# Patient Record
Sex: MALE | Race: WHITE | Employment: OTHER | URBAN - METROPOLITAN AREA
[De-identification: names, ages, dates, MRNs, and addresses within clinical notes are randomized per-mention and may not be internally consistent; named-entity substitution may affect disease eponyms.]

---

## 2017-01-09 RX ORDER — OMEPRAZOLE 40 MG/1
CAPSULE, DELAYED RELEASE ORAL
Qty: 90 CAPSULE | Refills: 3 | Status: SHIPPED | OUTPATIENT
Start: 2017-01-09 | End: 2017-04-14 | Stop reason: SDUPTHER

## 2017-02-24 RX ORDER — PRAVASTATIN SODIUM 20 MG
TABLET ORAL
Qty: 90 TABLET | Refills: 1 | Status: SHIPPED | OUTPATIENT
Start: 2017-02-24 | End: 2017-05-22 | Stop reason: SDUPTHER

## 2017-03-28 ENCOUNTER — OFFICE VISIT (OUTPATIENT)
Dept: FAMILY MEDICINE CLINIC | Age: 67
End: 2017-03-28

## 2017-03-28 VITALS
HEIGHT: 72 IN | WEIGHT: 271 LBS | SYSTOLIC BLOOD PRESSURE: 128 MMHG | TEMPERATURE: 98.1 F | RESPIRATION RATE: 18 BRPM | OXYGEN SATURATION: 98 % | HEART RATE: 72 BPM | DIASTOLIC BLOOD PRESSURE: 82 MMHG | BODY MASS INDEX: 36.7 KG/M2

## 2017-03-28 DIAGNOSIS — I10 ESSENTIAL HYPERTENSION: Primary | ICD-10-CM

## 2017-03-28 DIAGNOSIS — M10.072 ACUTE IDIOPATHIC GOUT OF LEFT FOOT: ICD-10-CM

## 2017-03-28 DIAGNOSIS — N18.9 CHRONIC KIDNEY DISEASE, UNSPECIFIED STAGE: ICD-10-CM

## 2017-03-28 PROCEDURE — G8419 CALC BMI OUT NRM PARAM NOF/U: HCPCS | Performed by: FAMILY MEDICINE

## 2017-03-28 PROCEDURE — G8598 ASA/ANTIPLAT THER USED: HCPCS | Performed by: FAMILY MEDICINE

## 2017-03-28 PROCEDURE — G8427 DOCREV CUR MEDS BY ELIG CLIN: HCPCS | Performed by: FAMILY MEDICINE

## 2017-03-28 PROCEDURE — 4040F PNEUMOC VAC/ADMIN/RCVD: CPT | Performed by: FAMILY MEDICINE

## 2017-03-28 PROCEDURE — 1036F TOBACCO NON-USER: CPT | Performed by: FAMILY MEDICINE

## 2017-03-28 PROCEDURE — 99214 OFFICE O/P EST MOD 30 MIN: CPT | Performed by: FAMILY MEDICINE

## 2017-03-28 PROCEDURE — 3017F COLORECTAL CA SCREEN DOC REV: CPT | Performed by: FAMILY MEDICINE

## 2017-03-28 PROCEDURE — 1123F ACP DISCUSS/DSCN MKR DOCD: CPT | Performed by: FAMILY MEDICINE

## 2017-03-28 PROCEDURE — G8484 FLU IMMUNIZE NO ADMIN: HCPCS | Performed by: FAMILY MEDICINE

## 2017-03-28 RX ORDER — ALLOPURINOL 300 MG/1
300 TABLET ORAL DAILY
Qty: 90 TABLET | Refills: 1 | Status: SHIPPED | OUTPATIENT
Start: 2017-03-28 | End: 2017-05-20 | Stop reason: SDUPTHER

## 2017-03-28 ASSESSMENT — ENCOUNTER SYMPTOMS
SHORTNESS OF BREATH: 0
ALLERGIC/IMMUNOLOGIC NEGATIVE: 1
GASTROINTESTINAL NEGATIVE: 1
EYES NEGATIVE: 1
RESPIRATORY NEGATIVE: 1

## 2017-04-03 RX ORDER — LISINOPRIL 5 MG/1
TABLET ORAL
Qty: 90 TABLET | Refills: 1 | Status: SHIPPED | OUTPATIENT
Start: 2017-04-03 | End: 2017-09-13 | Stop reason: SDUPTHER

## 2017-04-14 RX ORDER — OMEPRAZOLE 40 MG/1
CAPSULE, DELAYED RELEASE ORAL
Qty: 90 CAPSULE | Refills: 3 | Status: SHIPPED | OUTPATIENT
Start: 2017-04-14 | End: 2018-03-31 | Stop reason: SDUPTHER

## 2017-05-19 ENCOUNTER — OFFICE VISIT (OUTPATIENT)
Dept: CARDIOLOGY | Age: 67
End: 2017-05-19

## 2017-05-19 VITALS
DIASTOLIC BLOOD PRESSURE: 78 MMHG | WEIGHT: 221.2 LBS | HEART RATE: 67 BPM | HEIGHT: 72 IN | OXYGEN SATURATION: 98 % | BODY MASS INDEX: 29.96 KG/M2 | RESPIRATION RATE: 14 BRPM | SYSTOLIC BLOOD PRESSURE: 108 MMHG

## 2017-05-19 DIAGNOSIS — I25.10 CORONARY ARTERY DISEASE INVOLVING NATIVE CORONARY ARTERY OF NATIVE HEART, ANGINA PRESENCE UNSPECIFIED: Primary | ICD-10-CM

## 2017-05-19 DIAGNOSIS — I45.4 BBB (BUNDLE BRANCH BLOCK): ICD-10-CM

## 2017-05-19 DIAGNOSIS — I10 ESSENTIAL HYPERTENSION: ICD-10-CM

## 2017-05-19 DIAGNOSIS — E78.5 HYPERLIPIDEMIA, UNSPECIFIED HYPERLIPIDEMIA TYPE: ICD-10-CM

## 2017-05-19 DIAGNOSIS — Z95.0 PACEMAKER: ICD-10-CM

## 2017-05-19 PROCEDURE — G8598 ASA/ANTIPLAT THER USED: HCPCS | Performed by: INTERNAL MEDICINE

## 2017-05-19 PROCEDURE — G8417 CALC BMI ABV UP PARAM F/U: HCPCS | Performed by: INTERNAL MEDICINE

## 2017-05-19 PROCEDURE — 93000 ELECTROCARDIOGRAM COMPLETE: CPT | Performed by: INTERNAL MEDICINE

## 2017-05-19 PROCEDURE — 1123F ACP DISCUSS/DSCN MKR DOCD: CPT | Performed by: INTERNAL MEDICINE

## 2017-05-19 PROCEDURE — 1036F TOBACCO NON-USER: CPT | Performed by: INTERNAL MEDICINE

## 2017-05-19 PROCEDURE — G8427 DOCREV CUR MEDS BY ELIG CLIN: HCPCS | Performed by: INTERNAL MEDICINE

## 2017-05-19 PROCEDURE — 3017F COLORECTAL CA SCREEN DOC REV: CPT | Performed by: INTERNAL MEDICINE

## 2017-05-19 PROCEDURE — 99212 OFFICE O/P EST SF 10 MIN: CPT | Performed by: INTERNAL MEDICINE

## 2017-05-19 PROCEDURE — 4040F PNEUMOC VAC/ADMIN/RCVD: CPT | Performed by: INTERNAL MEDICINE

## 2017-05-19 RX ORDER — METOPROLOL SUCCINATE 25 MG/1
25 TABLET, EXTENDED RELEASE ORAL DAILY
Qty: 90 TABLET | Refills: 3 | Status: SHIPPED | OUTPATIENT
Start: 2017-05-19 | End: 2018-05-25 | Stop reason: SDUPTHER

## 2017-05-20 DIAGNOSIS — M10.072 ACUTE IDIOPATHIC GOUT OF LEFT FOOT: ICD-10-CM

## 2017-05-22 ENCOUNTER — TELEPHONE (OUTPATIENT)
Dept: CARDIOLOGY | Age: 67
End: 2017-05-22

## 2017-05-22 RX ORDER — PRAVASTATIN SODIUM 20 MG
20 TABLET ORAL DAILY
Qty: 90 TABLET | Refills: 1 | Status: SHIPPED | OUTPATIENT
Start: 2017-05-22 | End: 2017-05-23 | Stop reason: SDUPTHER

## 2017-05-22 RX ORDER — LEVETIRACETAM 250 MG/1
TABLET ORAL
Qty: 180 TABLET | Refills: 1 | Status: SHIPPED | OUTPATIENT
Start: 2017-05-22 | End: 2017-11-15 | Stop reason: SDUPTHER

## 2017-05-22 RX ORDER — ALLOPURINOL 300 MG/1
300 TABLET ORAL DAILY
Qty: 90 TABLET | Refills: 1 | Status: SHIPPED | OUTPATIENT
Start: 2017-05-22 | End: 2017-12-13 | Stop reason: SDUPTHER

## 2017-05-22 RX ORDER — COLCHICINE 0.6 MG/1
0.6 TABLET ORAL DAILY PRN
Qty: 30 TABLET | Refills: 3 | Status: SHIPPED | OUTPATIENT
Start: 2017-05-22 | End: 2021-12-16 | Stop reason: ALTCHOICE

## 2017-05-23 RX ORDER — PRAVASTATIN SODIUM 20 MG
20 TABLET ORAL DAILY
Qty: 90 TABLET | Refills: 1 | Status: SHIPPED | OUTPATIENT
Start: 2017-05-23 | End: 2017-12-13 | Stop reason: SDUPTHER

## 2017-09-13 RX ORDER — LISINOPRIL 5 MG/1
TABLET ORAL
Qty: 90 TABLET | Refills: 0 | Status: SHIPPED | OUTPATIENT
Start: 2017-09-13 | End: 2018-01-02 | Stop reason: SDUPTHER

## 2017-11-16 RX ORDER — LEVETIRACETAM 250 MG/1
TABLET ORAL
Qty: 180 TABLET | Refills: 0 | Status: SHIPPED | OUTPATIENT
Start: 2017-11-16 | End: 2018-02-15 | Stop reason: SDUPTHER

## 2017-12-13 DIAGNOSIS — M10.072 ACUTE IDIOPATHIC GOUT OF LEFT FOOT: ICD-10-CM

## 2017-12-13 RX ORDER — ALLOPURINOL 300 MG/1
TABLET ORAL
Qty: 90 TABLET | Refills: 0 | Status: SHIPPED | OUTPATIENT
Start: 2017-12-13 | End: 2018-04-09 | Stop reason: SDUPTHER

## 2017-12-14 RX ORDER — PRAVASTATIN SODIUM 20 MG
TABLET ORAL
Qty: 90 TABLET | Refills: 3 | Status: SHIPPED | OUTPATIENT
Start: 2017-12-14 | End: 2018-12-12 | Stop reason: SDUPTHER

## 2018-01-02 RX ORDER — LISINOPRIL 5 MG/1
TABLET ORAL
Qty: 90 TABLET | Refills: 0 | Status: SHIPPED | OUTPATIENT
Start: 2018-01-02 | End: 2018-03-27 | Stop reason: SDUPTHER

## 2018-01-10 ENCOUNTER — OFFICE VISIT (OUTPATIENT)
Dept: INTERNAL MEDICINE | Age: 68
End: 2018-01-10

## 2018-01-10 VITALS
BODY MASS INDEX: 32.53 KG/M2 | TEMPERATURE: 98.9 F | DIASTOLIC BLOOD PRESSURE: 70 MMHG | SYSTOLIC BLOOD PRESSURE: 120 MMHG | HEIGHT: 71 IN | OXYGEN SATURATION: 94 % | HEART RATE: 87 BPM | WEIGHT: 232.4 LBS | RESPIRATION RATE: 16 BRPM

## 2018-01-10 DIAGNOSIS — J40 BRONCHITIS: Primary | ICD-10-CM

## 2018-01-10 PROCEDURE — G8417 CALC BMI ABV UP PARAM F/U: HCPCS | Performed by: NURSE PRACTITIONER

## 2018-01-10 PROCEDURE — G8427 DOCREV CUR MEDS BY ELIG CLIN: HCPCS | Performed by: NURSE PRACTITIONER

## 2018-01-10 PROCEDURE — 99213 OFFICE O/P EST LOW 20 MIN: CPT | Performed by: NURSE PRACTITIONER

## 2018-01-10 PROCEDURE — 3017F COLORECTAL CA SCREEN DOC REV: CPT | Performed by: NURSE PRACTITIONER

## 2018-01-10 PROCEDURE — 1123F ACP DISCUSS/DSCN MKR DOCD: CPT | Performed by: NURSE PRACTITIONER

## 2018-01-10 PROCEDURE — 1036F TOBACCO NON-USER: CPT | Performed by: NURSE PRACTITIONER

## 2018-01-10 PROCEDURE — 4040F PNEUMOC VAC/ADMIN/RCVD: CPT | Performed by: NURSE PRACTITIONER

## 2018-01-10 PROCEDURE — G8598 ASA/ANTIPLAT THER USED: HCPCS | Performed by: NURSE PRACTITIONER

## 2018-01-10 PROCEDURE — G8484 FLU IMMUNIZE NO ADMIN: HCPCS | Performed by: NURSE PRACTITIONER

## 2018-01-10 RX ORDER — DOXYCYCLINE HYCLATE 100 MG
100 TABLET ORAL 2 TIMES DAILY
Qty: 20 TABLET | Refills: 0 | Status: SHIPPED | OUTPATIENT
Start: 2018-01-10 | End: 2018-01-20

## 2018-01-10 RX ORDER — DOXYCYCLINE HYCLATE 100 MG
100 TABLET ORAL 2 TIMES DAILY
Qty: 10 TABLET | Refills: 0 | Status: SHIPPED | OUTPATIENT
Start: 2018-01-10 | End: 2018-01-10 | Stop reason: SDUPTHER

## 2018-01-10 RX ORDER — RIVAROXABAN 20 MG/1
TABLET, FILM COATED ORAL
Refills: 3 | COMMUNITY
Start: 2017-12-22

## 2018-01-10 ASSESSMENT — ENCOUNTER SYMPTOMS
RHINORRHEA: 0
SORE THROAT: 0
WHEEZING: 0
SINUS PRESSURE: 1
CHEST TIGHTNESS: 0
SHORTNESS OF BREATH: 0
COUGH: 1

## 2018-01-10 ASSESSMENT — PATIENT HEALTH QUESTIONNAIRE - PHQ9
1. LITTLE INTEREST OR PLEASURE IN DOING THINGS: 0
SUM OF ALL RESPONSES TO PHQ9 QUESTIONS 1 & 2: 0
2. FEELING DOWN, DEPRESSED OR HOPELESS: 0
SUM OF ALL RESPONSES TO PHQ QUESTIONS 1-9: 0

## 2018-01-10 NOTE — PROGRESS NOTES
Subjective:      Patient ID: Arpit Coreas is a 75 y.o. male who presents today for:  Chief Complaint   Patient presents with    URI     Presents today with cold symptoms, states he woke up yesterday not feeling good, cold feeling, spitting up phlegm yellow in color, chills. Has taken ibuprofen with mild relief. Cough   This is a new problem. The current episode started in the past 7 days. The problem has been gradually worsening. The cough is productive of sputum. Associated symptoms include chills, nasal congestion and postnasal drip. Pertinent negatives include no chest pain, ear congestion, ear pain, fever, headaches, myalgias, rhinorrhea, sore throat, shortness of breath or wheezing. Nothing aggravates the symptoms. Treatments tried: advil. The treatment provided mild relief. His past medical history is significant for pneumonia. There is no history of asthma or COPD.        Past Medical History:   Diagnosis Date    BBB (bundle branch block)     RIGHT    CAD (coronary artery disease) 1/16/2015    Chronic kidney disease     GERD (gastroesophageal reflux disease)     Hyperlipidemia     Hypertension     Pelvic fracture (HCC)     REMOTE    Retinal artery occlusion 1/16/2015     Current Outpatient Prescriptions on File Prior to Visit   Medication Sig Dispense Refill    lisinopril (PRINIVIL;ZESTRIL) 5 MG tablet TAKE ONE TABLET BY MOUTH EVERY DAY 90 tablet 0    pravastatin (PRAVACHOL) 20 MG tablet TAKE ONE TABLET BY MOUTH EVERY DAY 90 tablet 3    allopurinol (ZYLOPRIM) 300 MG tablet TAKE ONE TABLET BY MOUTH EVERY DAY 90 tablet 0    levETIRAcetam (KEPPRA) 250 MG tablet TAKE ONE TABLET BY MOUTH TWO TIMES A  tablet 0    colchicine (COLCRYS) 0.6 MG tablet Take 1 tablet by mouth daily as needed for Pain 30 tablet 3    metoprolol succinate (TOPROL XL) 25 MG extended release tablet Take 1 tablet by mouth daily 90 tablet 3    omeprazole (PRILOSEC) 40 MG delayed release capsule TAKE ONE follow up to evaluate treatment results and for coordination of care. I have reviewed the patient's medical history in detail and updated the computerized patient record.     Brady Eng NP

## 2018-01-11 ENCOUNTER — OFFICE VISIT (OUTPATIENT)
Dept: FAMILY MEDICINE CLINIC | Age: 68
End: 2018-01-11

## 2018-01-11 VITALS
RESPIRATION RATE: 18 BRPM | WEIGHT: 232 LBS | HEIGHT: 71 IN | BODY MASS INDEX: 32.48 KG/M2 | DIASTOLIC BLOOD PRESSURE: 86 MMHG | TEMPERATURE: 100.6 F | OXYGEN SATURATION: 97 % | HEART RATE: 80 BPM | SYSTOLIC BLOOD PRESSURE: 138 MMHG

## 2018-01-11 DIAGNOSIS — J40 BRONCHITIS: ICD-10-CM

## 2018-01-11 DIAGNOSIS — R50.9 FEVER, UNSPECIFIED FEVER CAUSE: Primary | ICD-10-CM

## 2018-01-11 DIAGNOSIS — J10.1 INFLUENZA A: ICD-10-CM

## 2018-01-11 LAB
INFLUENZA A ANTIBODY: POSITIVE
INFLUENZA B ANTIBODY: NEGATIVE

## 2018-01-11 PROCEDURE — 1036F TOBACCO NON-USER: CPT | Performed by: FAMILY MEDICINE

## 2018-01-11 PROCEDURE — 99213 OFFICE O/P EST LOW 20 MIN: CPT | Performed by: FAMILY MEDICINE

## 2018-01-11 PROCEDURE — 3017F COLORECTAL CA SCREEN DOC REV: CPT | Performed by: FAMILY MEDICINE

## 2018-01-11 PROCEDURE — G8598 ASA/ANTIPLAT THER USED: HCPCS | Performed by: FAMILY MEDICINE

## 2018-01-11 PROCEDURE — G8427 DOCREV CUR MEDS BY ELIG CLIN: HCPCS | Performed by: FAMILY MEDICINE

## 2018-01-11 PROCEDURE — 1123F ACP DISCUSS/DSCN MKR DOCD: CPT | Performed by: FAMILY MEDICINE

## 2018-01-11 PROCEDURE — 87804 INFLUENZA ASSAY W/OPTIC: CPT | Performed by: FAMILY MEDICINE

## 2018-01-11 PROCEDURE — G8484 FLU IMMUNIZE NO ADMIN: HCPCS | Performed by: FAMILY MEDICINE

## 2018-01-11 PROCEDURE — G8417 CALC BMI ABV UP PARAM F/U: HCPCS | Performed by: FAMILY MEDICINE

## 2018-01-11 PROCEDURE — 4040F PNEUMOC VAC/ADMIN/RCVD: CPT | Performed by: FAMILY MEDICINE

## 2018-01-11 RX ORDER — LEVOFLOXACIN 500 MG/1
500 TABLET, FILM COATED ORAL DAILY
Qty: 10 TABLET | Refills: 0 | Status: SHIPPED | OUTPATIENT
Start: 2018-01-11 | End: 2018-01-21

## 2018-01-11 RX ORDER — OSELTAMIVIR PHOSPHATE 75 MG/1
75 CAPSULE ORAL 2 TIMES DAILY
Qty: 10 CAPSULE | Refills: 0 | Status: SHIPPED | OUTPATIENT
Start: 2018-01-11 | End: 2018-01-16

## 2018-01-11 ASSESSMENT — ENCOUNTER SYMPTOMS
GASTROINTESTINAL NEGATIVE: 1
SHORTNESS OF BREATH: 0
COUGH: 1
EYES NEGATIVE: 1
SORE THROAT: 1
ALLERGIC/IMMUNOLOGIC NEGATIVE: 1

## 2018-01-11 NOTE — PROGRESS NOTES
25 MG extended release tablet Take 1 tablet by mouth daily 90 tablet 3    omeprazole (PRILOSEC) 40 MG delayed release capsule TAKE ONE CAPSULE BY MOUTH EVERY DAY 90 capsule 3     No current facility-administered medications on file prior to visit. No Known Allergies  Health Maintenance   Topic Date Due    AAA screen  1950    Hepatitis C screen  1950    DTaP/Tdap/Td vaccine (1 - Tdap) 03/19/1968    Pneumococcal low/med risk (2 of 2 - PPSV23) 10/27/2016    Flu vaccine (1) 09/01/2017    Potassium monitoring  12/12/2018    Creatinine monitoring  12/12/2018    Lipid screen  01/05/2020    Colon cancer screen colonoscopy  03/01/2020    Zostavax vaccine  Addressed       Review of Systems    Review of Systems   Constitutional: Positive for fever. Negative for activity change, appetite change, chills and unexpected weight change. HENT: Positive for sore throat. Eyes: Negative. Respiratory: Positive for cough. Negative for shortness of breath. Cardiovascular: Negative. Negative for chest pain and palpitations. Gastrointestinal: Negative. Endocrine: Negative. Genitourinary: Negative. Musculoskeletal: Negative. Negative for myalgias. Skin: Negative. Allergic/Immunologic: Negative. Neurological: Negative. Negative for headaches. Hematological: Negative. Psychiatric/Behavioral: Negative. Physical Exam  Vitals:    01/11/18 1322   BP: 138/86   Pulse: 80   Resp: 18   Temp: 100.6 °F (38.1 °C)   TempSrc: Tympanic   SpO2: 97%   Weight: 232 lb (105.2 kg)   Height: 5' 11\" (1.803 m)       Physical Exam   Constitutional: He appears well-developed and well-nourished. HENT:   Right Ear: External ear normal.   Left Ear: External ear normal.   Eyes: Conjunctivae are normal. Pupils are equal, round, and reactive to light. Neck: Normal range of motion. Neck supple. No thyromegaly present. Cardiovascular: Normal rate, regular rhythm and normal heart sounds.     No murmur

## 2018-01-12 DIAGNOSIS — J06.9 VIRAL URI: Primary | ICD-10-CM

## 2018-02-01 ENCOUNTER — NURSE ONLY (OUTPATIENT)
Dept: FAMILY MEDICINE CLINIC | Age: 68
End: 2018-02-01
Payer: MEDICARE

## 2018-02-01 DIAGNOSIS — Z23 NEED FOR PNEUMOCOCCAL VACCINATION: Primary | ICD-10-CM

## 2018-02-01 PROCEDURE — 90732 PPSV23 VACC 2 YRS+ SUBQ/IM: CPT | Performed by: FAMILY MEDICINE

## 2018-02-01 PROCEDURE — G0009 ADMIN PNEUMOCOCCAL VACCINE: HCPCS | Performed by: FAMILY MEDICINE

## 2018-02-16 RX ORDER — LEVETIRACETAM 250 MG/1
TABLET ORAL
Qty: 180 TABLET | Refills: 0 | Status: SHIPPED | OUTPATIENT
Start: 2018-02-16

## 2018-02-27 ENCOUNTER — OFFICE VISIT (OUTPATIENT)
Dept: FAMILY MEDICINE CLINIC | Age: 68
End: 2018-02-27
Payer: MEDICARE

## 2018-02-27 VITALS
BODY MASS INDEX: 30.75 KG/M2 | SYSTOLIC BLOOD PRESSURE: 118 MMHG | TEMPERATURE: 97.9 F | RESPIRATION RATE: 14 BRPM | HEART RATE: 62 BPM | WEIGHT: 227 LBS | DIASTOLIC BLOOD PRESSURE: 82 MMHG | HEIGHT: 72 IN | OXYGEN SATURATION: 96 %

## 2018-02-27 DIAGNOSIS — J18.9 PNEUMONIA OF BOTH LOWER LOBES DUE TO INFECTIOUS ORGANISM: Primary | ICD-10-CM

## 2018-02-27 DIAGNOSIS — J94.8 PLEURAL CALCIFICATION: ICD-10-CM

## 2018-02-27 PROCEDURE — 3017F COLORECTAL CA SCREEN DOC REV: CPT | Performed by: FAMILY MEDICINE

## 2018-02-27 PROCEDURE — 1123F ACP DISCUSS/DSCN MKR DOCD: CPT | Performed by: FAMILY MEDICINE

## 2018-02-27 PROCEDURE — G8417 CALC BMI ABV UP PARAM F/U: HCPCS | Performed by: FAMILY MEDICINE

## 2018-02-27 PROCEDURE — 99213 OFFICE O/P EST LOW 20 MIN: CPT | Performed by: FAMILY MEDICINE

## 2018-02-27 PROCEDURE — G8598 ASA/ANTIPLAT THER USED: HCPCS | Performed by: FAMILY MEDICINE

## 2018-02-27 PROCEDURE — 1036F TOBACCO NON-USER: CPT | Performed by: FAMILY MEDICINE

## 2018-02-27 PROCEDURE — 4040F PNEUMOC VAC/ADMIN/RCVD: CPT | Performed by: FAMILY MEDICINE

## 2018-02-27 PROCEDURE — G8484 FLU IMMUNIZE NO ADMIN: HCPCS | Performed by: FAMILY MEDICINE

## 2018-02-27 PROCEDURE — G8427 DOCREV CUR MEDS BY ELIG CLIN: HCPCS | Performed by: FAMILY MEDICINE

## 2018-02-27 ASSESSMENT — ENCOUNTER SYMPTOMS
EYES NEGATIVE: 1
SHORTNESS OF BREATH: 0
ALLERGIC/IMMUNOLOGIC NEGATIVE: 1
GASTROINTESTINAL NEGATIVE: 1
RESPIRATORY NEGATIVE: 1

## 2018-02-27 NOTE — PROGRESS NOTES
Review of Systems    Review of Systems   Constitutional: Negative for activity change, appetite change, chills, fever and unexpected weight change. HENT: Negative. Eyes: Negative. Respiratory: Negative. Negative for shortness of breath. Cardiovascular: Negative. Negative for chest pain and palpitations. Gastrointestinal: Negative. Endocrine: Negative. Genitourinary: Negative. Musculoskeletal: Negative. Skin: Negative. Allergic/Immunologic: Negative. Neurological: Negative. Hematological: Negative. Psychiatric/Behavioral: Negative. Physical Exam  Vitals:    02/27/18 1021   BP: 118/82   Pulse: 62   Resp: 14   Temp: 97.9 °F (36.6 °C)   TempSrc: Temporal   SpO2: 96%   Weight: 227 lb (103 kg)   Height: 6' (1.829 m)       Physical Exam   Constitutional: He appears well-developed and well-nourished. HENT:   Right Ear: External ear normal.   Left Ear: External ear normal.   Eyes: Conjunctivae are normal. Pupils are equal, round, and reactive to light. Neck: Normal range of motion. Neck supple. No thyromegaly present. Cardiovascular: Normal rate, regular rhythm and normal heart sounds. No murmur heard. Pulmonary/Chest: Effort normal and breath sounds normal.   Abdominal: Soft. Bowel sounds are normal.   Musculoskeletal: Normal range of motion. He exhibits no edema or tenderness. Lymphadenopathy:     He has no cervical adenopathy. Assessment  1. Pneumonia of both lower lobes due to infectious organism  XR CHEST STANDARD (2 VW)    CT Chest WO Contrast   2.  Pleural calcification  CT Chest WO Contrast     Problem List     None          Plan  Orders Placed This Encounter   Procedures    XR CHEST STANDARD (2 VW)     Standing Status:   Future     Number of Occurrences:   1     Standing Expiration Date:   2/27/2019     Order Specific Question:   Reason for exam:     Answer:   follow up on pneumonia    CT Chest WO Contrast     Standing Status:   Future Standing Expiration Date:   2/27/2019     No orders of the defined types were placed in this encounter. No Follow-up on file.   Cassia Escobar MD

## 2018-03-16 ENCOUNTER — HOSPITAL ENCOUNTER (OUTPATIENT)
Dept: CT IMAGING | Age: 68
Discharge: HOME | End: 2018-03-18
Payer: MEDICARE

## 2018-03-16 DIAGNOSIS — J94.8 PLEURAL CALCIFICATION: ICD-10-CM

## 2018-03-16 DIAGNOSIS — J18.9 PNEUMONIA OF BOTH LOWER LOBES DUE TO INFECTIOUS ORGANISM: ICD-10-CM

## 2018-03-16 PROCEDURE — 71250 CT THORAX DX C-: CPT

## 2018-03-27 RX ORDER — LISINOPRIL 5 MG/1
TABLET ORAL
Qty: 90 TABLET | Refills: 1 | Status: SHIPPED | OUTPATIENT
Start: 2018-03-27 | End: 2018-08-23 | Stop reason: SDUPTHER

## 2018-04-02 RX ORDER — OMEPRAZOLE 40 MG/1
CAPSULE, DELAYED RELEASE ORAL
Qty: 90 CAPSULE | Refills: 2 | Status: SHIPPED | OUTPATIENT
Start: 2018-04-02 | End: 2019-01-03 | Stop reason: SDUPTHER

## 2018-04-09 DIAGNOSIS — M10.072 ACUTE IDIOPATHIC GOUT OF LEFT FOOT: ICD-10-CM

## 2018-04-09 RX ORDER — ALLOPURINOL 300 MG/1
TABLET ORAL
Qty: 90 TABLET | Refills: 1 | Status: SHIPPED | OUTPATIENT
Start: 2018-04-09 | End: 2020-11-02 | Stop reason: SDUPTHER

## 2018-05-18 ENCOUNTER — OFFICE VISIT (OUTPATIENT)
Dept: CARDIOLOGY CLINIC | Age: 68
End: 2018-05-18
Payer: MEDICARE

## 2018-05-18 VITALS
BODY MASS INDEX: 30.61 KG/M2 | HEIGHT: 72 IN | DIASTOLIC BLOOD PRESSURE: 70 MMHG | SYSTOLIC BLOOD PRESSURE: 110 MMHG | HEART RATE: 74 BPM | WEIGHT: 226 LBS

## 2018-05-18 DIAGNOSIS — E78.5 HYPERLIPIDEMIA, UNSPECIFIED HYPERLIPIDEMIA TYPE: ICD-10-CM

## 2018-05-18 DIAGNOSIS — I25.10 CORONARY ARTERY DISEASE INVOLVING NATIVE CORONARY ARTERY OF NATIVE HEART WITHOUT ANGINA PECTORIS: ICD-10-CM

## 2018-05-18 DIAGNOSIS — I10 ESSENTIAL HYPERTENSION: Primary | ICD-10-CM

## 2018-05-18 DIAGNOSIS — I48.0 PAROXYSMAL ATRIAL FIBRILLATION (HCC): ICD-10-CM

## 2018-05-18 DIAGNOSIS — Z95.0 PACEMAKER: ICD-10-CM

## 2018-05-18 PROCEDURE — 1036F TOBACCO NON-USER: CPT | Performed by: INTERNAL MEDICINE

## 2018-05-18 PROCEDURE — 99214 OFFICE O/P EST MOD 30 MIN: CPT | Performed by: INTERNAL MEDICINE

## 2018-05-18 PROCEDURE — G8598 ASA/ANTIPLAT THER USED: HCPCS | Performed by: INTERNAL MEDICINE

## 2018-05-18 PROCEDURE — 93000 ELECTROCARDIOGRAM COMPLETE: CPT | Performed by: INTERNAL MEDICINE

## 2018-05-18 PROCEDURE — G8417 CALC BMI ABV UP PARAM F/U: HCPCS | Performed by: INTERNAL MEDICINE

## 2018-05-18 PROCEDURE — 4040F PNEUMOC VAC/ADMIN/RCVD: CPT | Performed by: INTERNAL MEDICINE

## 2018-05-18 PROCEDURE — G8427 DOCREV CUR MEDS BY ELIG CLIN: HCPCS | Performed by: INTERNAL MEDICINE

## 2018-05-18 PROCEDURE — 1123F ACP DISCUSS/DSCN MKR DOCD: CPT | Performed by: INTERNAL MEDICINE

## 2018-05-18 PROCEDURE — 3017F COLORECTAL CA SCREEN DOC REV: CPT | Performed by: INTERNAL MEDICINE

## 2018-05-18 RX ORDER — NAPROXEN 500 MG/1
500 TABLET ORAL
COMMUNITY
End: 2021-12-16 | Stop reason: ALTCHOICE

## 2018-05-25 RX ORDER — METOPROLOL SUCCINATE 25 MG/1
TABLET, EXTENDED RELEASE ORAL
Qty: 90 TABLET | Refills: 2 | Status: SHIPPED | OUTPATIENT
Start: 2018-05-25 | End: 2019-02-21 | Stop reason: SDUPTHER

## 2018-07-24 ENCOUNTER — OFFICE VISIT (OUTPATIENT)
Dept: CARDIOLOGY CLINIC | Age: 68
End: 2018-07-24
Payer: MEDICARE

## 2018-07-24 VITALS
HEART RATE: 60 BPM | SYSTOLIC BLOOD PRESSURE: 118 MMHG | RESPIRATION RATE: 12 BRPM | WEIGHT: 227.2 LBS | DIASTOLIC BLOOD PRESSURE: 68 MMHG | OXYGEN SATURATION: 98 % | BODY MASS INDEX: 30.81 KG/M2

## 2018-07-24 DIAGNOSIS — Z95.0 PACEMAKER: ICD-10-CM

## 2018-07-24 DIAGNOSIS — R55 VASOVAGAL SYNCOPE: ICD-10-CM

## 2018-07-24 DIAGNOSIS — I48.0 PAROXYSMAL ATRIAL FIBRILLATION (HCC): Primary | ICD-10-CM

## 2018-07-24 DIAGNOSIS — I48.0 PAROXYSMAL ATRIAL FIBRILLATION (HCC): ICD-10-CM

## 2018-07-24 DIAGNOSIS — I10 ESSENTIAL HYPERTENSION: ICD-10-CM

## 2018-07-24 DIAGNOSIS — I25.10 CORONARY ARTERY DISEASE INVOLVING NATIVE CORONARY ARTERY OF NATIVE HEART WITHOUT ANGINA PECTORIS: ICD-10-CM

## 2018-07-24 DIAGNOSIS — E78.5 HYPERLIPIDEMIA, UNSPECIFIED HYPERLIPIDEMIA TYPE: ICD-10-CM

## 2018-07-24 LAB
ALBUMIN SERPL-MCNC: 4.5 G/DL (ref 3.9–4.9)
ALP BLD-CCNC: 39 U/L (ref 35–104)
ALT SERPL-CCNC: 24 U/L (ref 0–41)
ANION GAP SERPL CALCULATED.3IONS-SCNC: 15 MEQ/L (ref 7–13)
AST SERPL-CCNC: 22 U/L (ref 0–40)
BILIRUB SERPL-MCNC: 0.5 MG/DL (ref 0–1.2)
BUN BLDV-MCNC: 28 MG/DL (ref 8–23)
CALCIUM SERPL-MCNC: 9.7 MG/DL (ref 8.6–10.2)
CHLORIDE BLD-SCNC: 100 MEQ/L (ref 98–107)
CO2: 25 MEQ/L (ref 22–29)
CREAT SERPL-MCNC: 1.24 MG/DL (ref 0.7–1.2)
GFR AFRICAN AMERICAN: >60
GFR NON-AFRICAN AMERICAN: 57.7
GLOBULIN: 2.5 G/DL (ref 2.3–3.5)
GLUCOSE BLD-MCNC: 97 MG/DL (ref 74–109)
HCT VFR BLD CALC: 39.9 % (ref 42–52)
HEMOGLOBIN: 14 G/DL (ref 14–18)
MCH RBC QN AUTO: 35.2 PG (ref 27–31.3)
MCHC RBC AUTO-ENTMCNC: 34.9 % (ref 33–37)
MCV RBC AUTO: 100.7 FL (ref 80–100)
PDW BLD-RTO: 13.3 % (ref 11.5–14.5)
PLATELET # BLD: 193 K/UL (ref 130–400)
POTASSIUM SERPL-SCNC: 5.3 MEQ/L (ref 3.5–5.1)
RBC # BLD: 3.97 M/UL (ref 4.7–6.1)
SODIUM BLD-SCNC: 140 MEQ/L (ref 132–144)
TOTAL PROTEIN: 7 G/DL (ref 6.4–8.1)
WBC # BLD: 7.5 K/UL (ref 4.8–10.8)

## 2018-07-24 PROCEDURE — G8417 CALC BMI ABV UP PARAM F/U: HCPCS | Performed by: INTERNAL MEDICINE

## 2018-07-24 PROCEDURE — G8598 ASA/ANTIPLAT THER USED: HCPCS | Performed by: INTERNAL MEDICINE

## 2018-07-24 PROCEDURE — 1123F ACP DISCUSS/DSCN MKR DOCD: CPT | Performed by: INTERNAL MEDICINE

## 2018-07-24 PROCEDURE — 1101F PT FALLS ASSESS-DOCD LE1/YR: CPT | Performed by: INTERNAL MEDICINE

## 2018-07-24 PROCEDURE — 93000 ELECTROCARDIOGRAM COMPLETE: CPT | Performed by: INTERNAL MEDICINE

## 2018-07-24 PROCEDURE — 99214 OFFICE O/P EST MOD 30 MIN: CPT | Performed by: INTERNAL MEDICINE

## 2018-07-24 PROCEDURE — 4040F PNEUMOC VAC/ADMIN/RCVD: CPT | Performed by: INTERNAL MEDICINE

## 2018-07-24 PROCEDURE — 1036F TOBACCO NON-USER: CPT | Performed by: INTERNAL MEDICINE

## 2018-07-24 PROCEDURE — 3017F COLORECTAL CA SCREEN DOC REV: CPT | Performed by: INTERNAL MEDICINE

## 2018-07-24 PROCEDURE — G8427 DOCREV CUR MEDS BY ELIG CLIN: HCPCS | Performed by: INTERNAL MEDICINE

## 2018-07-24 RX ORDER — HYDROCODONE BITARTRATE AND ACETAMINOPHEN 5; 325 MG/1; MG/1
1 TABLET ORAL EVERY 6 HOURS PRN
COMMUNITY
End: 2019-05-03 | Stop reason: ALTCHOICE

## 2018-07-24 RX ORDER — AMOXICILLIN 500 MG/1
500 CAPSULE ORAL 3 TIMES DAILY
COMMUNITY
Start: 2018-07-24 | End: 2018-07-31

## 2018-07-24 NOTE — PROGRESS NOTES
Chief Complaint   Patient presents with    Dizziness    Loss of Consciousness     S/P FALL 7/23/2018       1-16-15: Patient presents for initial medical evaluation. Patient is followed on a regular basis by Dr. Tracy Serrato MD. S/p abnormal nuclear stress test with normal LVF, s/p LHC with 40-50% mid LAD stenosis with ? Myocardial bridgigng, normal LVF. Doing well overall. Pt denies chest pain, dyspnea, dyspnea on exertion, change in exercise capacity, fatigue,  nausea, vomiting, diarrhea, constipation, motor weakness, insomnia, weight loss, syncope, dizziness, lightheadedness, palpitations, PND, orthopnea, or claudication. Did have some eye issue that started all of the cardiac workup. Following with opthamologist. S/p carotid US with 1-15% stenosis b/l.     1-30-15: as above, s/p Echo with normal LVF, EF of 60%, mild MR, stage I DD. Left eye is feeling better. Pt denies chest pain, dyspnea, dyspnea on exertion, change in exercise capacity, fatigue,  nausea, vomiting, diarrhea, constipation, motor weakness, insomnia, weight loss, syncope, dizziness, lightheadedness, palpitations, PND, orthopnea, or claudication. 7-3-15: as above, Pt denies chest pain, dyspnea, dyspnea on exertion, change in exercise capacity, fatigue,  nausea, vomiting, diarrhea, constipation, motor weakness, insomnia, weight loss, syncope, dizziness, lightheadedness, palpitations, PND, orthopnea, or claudication. Compliant with meds. No hospitalizations. No LE edema. BP is under control. No muscle or body aches. Lipid profile in Jan/2015 was normal.     1-29-16: as above, apparently developed a syncopal episode a month ago, was hospitalized at [facility] with bradycardia, s/p PPM. Was also noted to have seizures on further testing.  Pt denies chest pain, dyspnea, dyspnea on exertion, change in exercise capacity, fatigue,  nausea, vomiting, diarrhea, constipation, motor weakness, insomnia, weight loss, syncope, dizziness, lightheadedness, appearing, and in no distress  Mental status - alert, oriented to person, place, and time  Neck - Neck is supple, no JVD or carotid bruits. No thyromegaly or adenopathy. Chest - clear to auscultation, no wheezes, rales or rhonchi, symmetric air entry  Heart - normal rate, regular rhythm, normal S1, S2, no murmurs, rubs, clicks or gallops  Abdomen - soft, nontender, nondistended, no masses or organomegaly  Neurological - alert, oriented, normal speech, no focal findings or movement disorder noted  Extremities - peripheral pulses normal, no pedal edema, no clubbing or cyanosis  Skin - normal coloration and turgor, no rashes, no suspicious skin lesions noted    Orders Placed This Encounter   Procedures    CBC    Comprehensive Metabolic Panel    EKG 12 lead     NSR, LBBB. ASSESSMENT:     Diagnosis Orders   1. Paroxysmal atrial fibrillation (HCC)  EKG 12 lead    CBC    Comprehensive Metabolic Panel   2. Essential hypertension     3. Hyperlipidemia, unspecified hyperlipidemia type     4. Coronary artery disease involving native coronary artery of native heart without angina pectoris     5. Pacemaker     6. Vasovagal syncope  CBC    Comprehensive Metabolic Panel         PLAN:     Patient will need to continue to follow up with you for their general medical care and return to see me in the office in 6 months    As always, aggressive risk factor modification is strongly recommended. We should adhere to the [address] VII guidelines for HTN management and the NCEP ATP III guidelines for LDL-C management. Cardiac diet is always recommended with low fat, cholesterol, calories and sodium. Follow up with Dr. Alessandro Lovell and neuro. Need PPM interrogation. Check 2D Echo for LV function, PA pressures, wall motion abnormalities and any significant valvular disease. Check EKG. Check labs. Continue medications at current doses.      Patient was advised and encouraged to check blood pressure at home or at a pharmacy, maintain a logbook, and also call us back if blood pressure are above the target ranges or if it is low. Patient clearly understands and agrees to the instructions. We will need to continue to monitor muscle and liver enzymes, BUN, CR, and electrolytes. Details of medical condition explained and patient was warned about adverse consequences of uncontrolled medical conditions and possible side effects of prescribed medications. Patient was advised to go to the ER if he starts experiencing adverse effects of the medications. patient was instructed to call us back or go to nearby emergency room immediately if symptoms get worse or do not improve. Thank you for allowing me to participate in the care of your patient, please don't hesitate to contact me if you have any further questions.

## 2018-07-27 ENCOUNTER — HOSPITAL ENCOUNTER (OUTPATIENT)
Dept: NON INVASIVE DIAGNOSTICS | Age: 68
Discharge: HOME | End: 2018-07-27
Payer: MEDICARE

## 2018-07-27 DIAGNOSIS — R55 VASOVAGAL SYNCOPE: ICD-10-CM

## 2018-07-27 LAB
LV EF: 43 %
LVEF MODALITY: NORMAL

## 2018-07-27 PROCEDURE — 93306 TTE W/DOPPLER COMPLETE: CPT

## 2018-08-23 ENCOUNTER — OFFICE VISIT (OUTPATIENT)
Dept: CARDIOLOGY CLINIC | Age: 68
End: 2018-08-23
Payer: MEDICARE

## 2018-08-23 VITALS
DIASTOLIC BLOOD PRESSURE: 80 MMHG | WEIGHT: 226 LBS | OXYGEN SATURATION: 99 % | BODY MASS INDEX: 30.61 KG/M2 | HEIGHT: 72 IN | HEART RATE: 66 BPM | SYSTOLIC BLOOD PRESSURE: 108 MMHG | RESPIRATION RATE: 12 BRPM

## 2018-08-23 DIAGNOSIS — I42.8 CARDIOMYOPATHY, NONISCHEMIC (HCC): ICD-10-CM

## 2018-08-23 DIAGNOSIS — I45.4 BBB (BUNDLE BRANCH BLOCK): Primary | ICD-10-CM

## 2018-08-23 DIAGNOSIS — I48.0 PAROXYSMAL ATRIAL FIBRILLATION (HCC): ICD-10-CM

## 2018-08-23 DIAGNOSIS — I10 ESSENTIAL HYPERTENSION: ICD-10-CM

## 2018-08-23 DIAGNOSIS — I25.10 CORONARY ARTERY DISEASE INVOLVING NATIVE CORONARY ARTERY OF NATIVE HEART WITHOUT ANGINA PECTORIS: ICD-10-CM

## 2018-08-23 DIAGNOSIS — Z95.0 PACEMAKER: ICD-10-CM

## 2018-08-23 DIAGNOSIS — E78.5 HYPERLIPIDEMIA, UNSPECIFIED HYPERLIPIDEMIA TYPE: ICD-10-CM

## 2018-08-23 PROCEDURE — 1036F TOBACCO NON-USER: CPT | Performed by: INTERNAL MEDICINE

## 2018-08-23 PROCEDURE — G8417 CALC BMI ABV UP PARAM F/U: HCPCS | Performed by: INTERNAL MEDICINE

## 2018-08-23 PROCEDURE — 3017F COLORECTAL CA SCREEN DOC REV: CPT | Performed by: INTERNAL MEDICINE

## 2018-08-23 PROCEDURE — G8598 ASA/ANTIPLAT THER USED: HCPCS | Performed by: INTERNAL MEDICINE

## 2018-08-23 PROCEDURE — 4040F PNEUMOC VAC/ADMIN/RCVD: CPT | Performed by: INTERNAL MEDICINE

## 2018-08-23 PROCEDURE — G8427 DOCREV CUR MEDS BY ELIG CLIN: HCPCS | Performed by: INTERNAL MEDICINE

## 2018-08-23 PROCEDURE — 1101F PT FALLS ASSESS-DOCD LE1/YR: CPT | Performed by: INTERNAL MEDICINE

## 2018-08-23 PROCEDURE — 1123F ACP DISCUSS/DSCN MKR DOCD: CPT | Performed by: INTERNAL MEDICINE

## 2018-08-23 PROCEDURE — 99214 OFFICE O/P EST MOD 30 MIN: CPT | Performed by: INTERNAL MEDICINE

## 2018-08-23 RX ORDER — LISINOPRIL 5 MG/1
TABLET ORAL
Qty: 90 TABLET | Refills: 1 | Status: SHIPPED | OUTPATIENT
Start: 2018-08-23 | End: 2019-02-21 | Stop reason: SDUPTHER

## 2018-08-23 NOTE — PROGRESS NOTES
Chief Complaint   Patient presents with    Results     echo 7/27/18    Coronary Artery Disease       1-16-15: Patient presents for initial medical evaluation. Patient is followed on a regular basis by Dr. Salma Faith MD. S/p abnormal nuclear stress test with normal LVF, s/p LHC with 40-50% mid LAD stenosis with ? Myocardial bridgigng, normal LVF. Doing well overall. Pt denies chest pain, dyspnea, dyspnea on exertion, change in exercise capacity, fatigue,  nausea, vomiting, diarrhea, constipation, motor weakness, insomnia, weight loss, syncope, dizziness, lightheadedness, palpitations, PND, orthopnea, or claudication. Did have some eye issue that started all of the cardiac workup. Following with opthamologist. S/p carotid US with 1-15% stenosis b/l.     1-30-15: as above, s/p Echo with normal LVF, EF of 60%, mild MR, stage I DD. Left eye is feeling better. Pt denies chest pain, dyspnea, dyspnea on exertion, change in exercise capacity, fatigue,  nausea, vomiting, diarrhea, constipation, motor weakness, insomnia, weight loss, syncope, dizziness, lightheadedness, palpitations, PND, orthopnea, or claudication. 7-3-15: as above, Pt denies chest pain, dyspnea, dyspnea on exertion, change in exercise capacity, fatigue,  nausea, vomiting, diarrhea, constipation, motor weakness, insomnia, weight loss, syncope, dizziness, lightheadedness, palpitations, PND, orthopnea, or claudication. Compliant with meds. No hospitalizations. No LE edema. BP is under control. No muscle or body aches. Lipid profile in Jan/2015 was normal.     1-29-16: as above, apparently developed a syncopal episode a month ago, was hospitalized at [facility] with bradycardia, s/p PPM. Was also noted to have seizures on further testing.  Pt denies chest pain, dyspnea, dyspnea on exertion, change in exercise capacity, fatigue,  nausea, vomiting, diarrhea, constipation, motor weakness, insomnia, weight loss, syncope, dizziness, lightheadedness, palpitations, PND, orthopnea, or claudication. Lipid profile and previous labs reviwed. BP is good. CAD is stable. 3-4-16: as above, s/p hospitalization due to severe CP which was felt to be atypical and MSCK. His BP was high and was placed on his Toprol XL again and feeling much better now. Symptoms have resolved. Pt denies chest pain, dyspnea, dyspnea on exertion, change in exercise capacity, fatigue,  nausea, vomiting, diarrhea, constipation, motor weakness, insomnia, weight loss, syncope, dizziness, lightheadedness, palpitations, PND, orthopnea, or claudication. Bp is good. CAD is stable. PPM site is healing. No seizures. 9-9-16: as above, Pt denies chest pain, dyspnea, dyspnea on exertion, change in exercise capacity, fatigue,  nausea, vomiting, diarrhea, constipation, motor weakness, insomnia, weight loss, syncope, dizziness, lightheadedness, palpitations, PND, orthopnea, or claudication. S/p PPM check with Dr. Wu Demarco. No CHF type symptoms. No hospitalizations. No change in meds. BP and HR are good. Playing golf, going to gym, yard work and no symptoms. No Le ulcers or dicoloration. 5-19-17: Pt denies chest pain, dyspnea, dyspnea on exertion, change in exercise capacity, fatigue,  nausea, vomiting, diarrhea, constipation, motor weakness, insomnia, weight loss, syncope, dizziness, lightheadedness, palpitations, PND, orthopnea, or claudication. No nitro use. BP and hr are good. CAD is stable. No LE discoloration or ulcers. No LE edema. No CHF type symptoms. Lipid profile is normal.  Pacer check is good. EKG with NSR, lbbb.     5-18-18: Pt denies chest pain, dyspnea, dyspnea on exertion, change in exercise capacity, fatigue,  nausea, vomiting, diarrhea, constipation, motor weakness, insomnia, weight loss, syncope, dizziness, lightheadedness, palpitations, PND, orthopnea, or claudication. No nitro use. BP and hr are good. CAD is stable. No LE discoloration or ulcers. No LE edema. No CHF type symptoms.  Lipid history of blood clots or bleeding disorder. Respiratory ROS: no cough, shortness of breath, or wheezing  Cardiovascular ROS: no chest pain or dyspnea on exertion  negative  Gastrointestinal ROS: no abdominal pain, change in bowel habits, or black or bloody stools  Genito-Urinary ROS: no dysuria, trouble voiding, or hematuria  Musculoskeletal ROS: negative  Neurological ROS: no TIA or stroke symptoms  Dermatological ROS: negative    VITALS:  Blood pressure 108/80, pulse 66, resp. rate 12, height 6' (1.829 m), weight 226 lb (102.5 kg), SpO2 99 %. Body mass index is 30.65 kg/m². Physical Examination:  General appearance - alert, well appearing, and in no distress  Mental status - alert, oriented to person, place, and time  Neck - Neck is supple, no JVD or carotid bruits. No thyromegaly or adenopathy. Chest - clear to auscultation, no wheezes, rales or rhonchi, symmetric air entry  Heart - normal rate, regular rhythm, normal S1, S2, no murmurs, rubs, clicks or gallops  Abdomen - soft, nontender, nondistended, no masses or organomegaly  Neurological - alert, oriented, normal speech, no focal findings or movement disorder noted  Extremities - peripheral pulses normal, no pedal edema, no clubbing or cyanosis  Skin - normal coloration and turgor, no rashes, no suspicious skin lesions noted    No orders of the defined types were placed in this encounter. NSR, LBBB. ASSESSMENT:     Diagnosis Orders   1. BBB (bundle branch block)     2. Essential hypertension     3. Hyperlipidemia, unspecified hyperlipidemia type     4. Coronary artery disease involving native coronary artery of native heart without angina pectoris     5. Paroxysmal atrial fibrillation (HCC)     6. Pacemaker     7.  Cardiomyopathy, nonischemic [facility])           PLAN:     Patient will need to continue to follow up with you for their general medical care and return to see me in the office in 6 months    As always, aggressive risk factor modification is strongly recommended. We should adhere to the [address] guidelines for HTN management and the NCEP ATP III guidelines for LDL-C management. Cardiac diet is always recommended with low fat, cholesterol, calories and sodium. Follow up with Dr. Ramila Clarke and neuro. Oral fluid hydration. If renal function worsens then may need to stop ACEI. Recheck ECHO in one year for CMP. Had a long talk with the patient regarding their symptoms, test results and the different treatment options available which include cardiac cath, alternate imaging modality and medical therapy. Patient would like to proceed with cardiac catheterization and understands the risks and benefits of the procedure. Given new CMP and recent syncope that is not related to an arrhythmia. Continue medications at current doses. Patient was advised and encouraged to check blood pressure at home or at a pharmacy, maintain a logbook, and also call us back if blood pressure are above the target ranges or if it is low. Patient clearly understands and agrees to the instructions. We will need to continue to monitor muscle and liver enzymes, BUN, CR, and electrolytes. Details of medical condition explained and patient was warned about adverse consequences of uncontrolled medical conditions and possible side effects of prescribed medications. Patient was advised to go to the ER if he starts experiencing adverse effects of the medications. patient was instructed to call us back or go to nearby emergency room immediately if symptoms get worse or do not improve. Thank you for allowing me to participate in the care of your patient, please don't hesitate to contact me if you have any further questions.

## 2018-08-24 ENCOUNTER — HOSPITAL ENCOUNTER (OUTPATIENT)
Dept: CARDIAC CATH/INVASIVE PROCEDURES | Age: 68
Discharge: HOME | End: 2018-08-24
Attending: INTERNAL MEDICINE | Admitting: INTERNAL MEDICINE
Payer: MEDICARE

## 2018-08-24 VITALS
HEIGHT: 72 IN | DIASTOLIC BLOOD PRESSURE: 75 MMHG | WEIGHT: 222 LBS | HEART RATE: 60 BPM | OXYGEN SATURATION: 100 % | RESPIRATION RATE: 21 BRPM | TEMPERATURE: 97.5 F | SYSTOLIC BLOOD PRESSURE: 111 MMHG | BODY MASS INDEX: 30.07 KG/M2

## 2018-08-24 PROBLEM — I42.9 CARDIOMYOPATHY (HCC): Status: ACTIVE | Noted: 2018-08-23

## 2018-08-24 LAB
ANION GAP SERPL CALCULATED.3IONS-SCNC: 12 MEQ/L (ref 7–13)
BUN BLDV-MCNC: 27 MG/DL (ref 8–23)
CALCIUM SERPL-MCNC: 9.1 MG/DL (ref 8.6–10.2)
CHLORIDE BLD-SCNC: 103 MEQ/L (ref 98–107)
CO2: 24 MEQ/L (ref 22–29)
CREAT SERPL-MCNC: 1.06 MG/DL (ref 0.7–1.2)
GFR AFRICAN AMERICAN: >60
GFR NON-AFRICAN AMERICAN: >60
GLUCOSE BLD-MCNC: 85 MG/DL (ref 74–109)
HCT VFR BLD CALC: 40.7 % (ref 42–52)
HEMOGLOBIN: 14.1 G/DL (ref 14–18)
INR BLD: 1.2
LV EF: 50 %
LVEF MODALITY: NORMAL
MCH RBC QN AUTO: 35 PG (ref 27–31.3)
MCHC RBC AUTO-ENTMCNC: 34.7 % (ref 33–37)
MCV RBC AUTO: 100.9 FL (ref 80–100)
PDW BLD-RTO: 13.4 % (ref 11.5–14.5)
PLATELET # BLD: 178 K/UL (ref 130–400)
POTASSIUM REFLEX MAGNESIUM: 4.8 MEQ/L (ref 3.5–5.1)
PROTHROMBIN TIME: 12.6 SEC (ref 9.6–12.3)
RBC # BLD: 4.04 M/UL (ref 4.7–6.1)
SODIUM BLD-SCNC: 139 MEQ/L (ref 132–144)
WBC # BLD: 6.3 K/UL (ref 4.8–10.8)

## 2018-08-24 PROCEDURE — C1894 INTRO/SHEATH, NON-LASER: HCPCS

## 2018-08-24 PROCEDURE — 85610 PROTHROMBIN TIME: CPT

## 2018-08-24 PROCEDURE — 2720000010 HC SURG SUPPLY STERILE

## 2018-08-24 PROCEDURE — 6360000002 HC RX W HCPCS

## 2018-08-24 PROCEDURE — 2500000003 HC RX 250 WO HCPCS

## 2018-08-24 PROCEDURE — 2709999900 HC NON-CHARGEABLE SUPPLY

## 2018-08-24 PROCEDURE — 6370000000 HC RX 637 (ALT 250 FOR IP): Performed by: INTERNAL MEDICINE

## 2018-08-24 PROCEDURE — 93458 L HRT ARTERY/VENTRICLE ANGIO: CPT | Performed by: INTERNAL MEDICINE

## 2018-08-24 PROCEDURE — 2580000003 HC RX 258

## 2018-08-24 PROCEDURE — 80048 BASIC METABOLIC PNL TOTAL CA: CPT

## 2018-08-24 PROCEDURE — 85027 COMPLETE CBC AUTOMATED: CPT

## 2018-08-24 PROCEDURE — C1725 CATH, TRANSLUMIN NON-LASER: HCPCS

## 2018-08-24 PROCEDURE — C1769 GUIDE WIRE: HCPCS

## 2018-08-24 RX ORDER — ASPIRIN 81 MG/1
81 TABLET, CHEWABLE ORAL ONCE
Status: DISCONTINUED | OUTPATIENT
Start: 2018-08-24 | End: 2018-08-24

## 2018-08-24 RX ORDER — SODIUM CHLORIDE 9 MG/ML
INJECTION, SOLUTION INTRAVENOUS CONTINUOUS
Status: DISCONTINUED | OUTPATIENT
Start: 2018-08-24 | End: 2018-08-24 | Stop reason: HOSPADM

## 2018-08-24 RX ORDER — SODIUM CHLORIDE 0.9 % (FLUSH) 0.9 %
10 SYRINGE (ML) INJECTION PRN
Status: DISCONTINUED | OUTPATIENT
Start: 2018-08-24 | End: 2018-08-24 | Stop reason: HOSPADM

## 2018-08-24 RX ORDER — LABETALOL HYDROCHLORIDE 5 MG/ML
10 INJECTION, SOLUTION INTRAVENOUS EVERY 30 MIN PRN
Status: CANCELLED | OUTPATIENT
Start: 2018-08-24

## 2018-08-24 RX ORDER — ONDANSETRON 2 MG/ML
4 INJECTION INTRAMUSCULAR; INTRAVENOUS EVERY 6 HOURS PRN
Status: CANCELLED | OUTPATIENT
Start: 2018-08-24

## 2018-08-24 RX ORDER — RANOLAZINE 500 MG/1
500 TABLET, EXTENDED RELEASE ORAL ONCE
Status: COMPLETED | OUTPATIENT
Start: 2018-08-24 | End: 2018-08-24

## 2018-08-24 RX ORDER — SODIUM CHLORIDE 0.9 % (FLUSH) 0.9 %
10 SYRINGE (ML) INJECTION EVERY 12 HOURS SCHEDULED
Status: DISCONTINUED | OUTPATIENT
Start: 2018-08-24 | End: 2018-08-24 | Stop reason: HOSPADM

## 2018-08-24 RX ORDER — ACETAMINOPHEN 325 MG/1
650 TABLET ORAL EVERY 4 HOURS PRN
Status: CANCELLED | OUTPATIENT
Start: 2018-08-24

## 2018-08-24 RX ORDER — HYDRALAZINE HYDROCHLORIDE 20 MG/ML
10 INJECTION INTRAMUSCULAR; INTRAVENOUS EVERY 10 MIN PRN
Status: CANCELLED | OUTPATIENT
Start: 2018-08-24

## 2018-08-24 RX ADMIN — RANOLAZINE 500 MG: 500 TABLET, FILM COATED, EXTENDED RELEASE ORAL at 13:37

## 2018-08-24 NOTE — BRIEF OP NOTE
Section of Cardiology  Adult Brief Cardiac Cath Procedure Note        Procedure(s):  LHC, b/l coronary angio    Pre-operative Diagnosis:  CMP    H&P Status: Completed and reviewed.      Post-operative Diagnosis:  Normal coronaries    Findings:  See full report    Complications:  none    Primary Proceduralist:   Dr.Wes Ruiz DO      Full procedure note to follow

## 2018-10-02 ENCOUNTER — NURSE ONLY (OUTPATIENT)
Dept: INTERNAL MEDICINE CLINIC | Age: 68
End: 2018-10-02
Payer: MEDICARE

## 2018-10-02 DIAGNOSIS — Z23 NEED FOR INFLUENZA VACCINATION: Primary | ICD-10-CM

## 2018-10-02 PROCEDURE — 90662 IIV NO PRSV INCREASED AG IM: CPT | Performed by: FAMILY MEDICINE

## 2018-10-02 PROCEDURE — G0008 ADMIN INFLUENZA VIRUS VAC: HCPCS | Performed by: FAMILY MEDICINE

## 2018-10-02 NOTE — PROGRESS NOTES
Vaccine Information Sheet, \"Influenza - Inactivated\"  given to Sandra Romero, or parent/legal guardian of  Sandra Romero and verbalized understanding. Patient responses:    Have you ever had a reaction to a flu vaccine? No  Are you able to eat eggs without adverse effects? Yes  Do you have any current illness? No  Have you ever had Guillian McCaulley Syndrome? No    Flu vaccine given per order. Please see immunization tab.

## 2018-12-13 RX ORDER — PRAVASTATIN SODIUM 20 MG
TABLET ORAL
Qty: 90 TABLET | Refills: 3 | Status: SHIPPED | OUTPATIENT
Start: 2018-12-13 | End: 2019-02-21 | Stop reason: SDUPTHER

## 2019-01-03 RX ORDER — OMEPRAZOLE 40 MG/1
CAPSULE, DELAYED RELEASE ORAL
Qty: 90 CAPSULE | Refills: 2 | Status: SHIPPED | OUTPATIENT
Start: 2019-01-03 | End: 2019-10-14 | Stop reason: SDUPTHER

## 2019-02-22 RX ORDER — METOPROLOL SUCCINATE 25 MG/1
TABLET, EXTENDED RELEASE ORAL
Qty: 90 TABLET | Refills: 3 | Status: SHIPPED | OUTPATIENT
Start: 2019-02-22 | End: 2019-05-07 | Stop reason: SDUPTHER

## 2019-02-22 RX ORDER — LISINOPRIL 5 MG/1
TABLET ORAL
Qty: 90 TABLET | Refills: 3 | Status: SHIPPED | OUTPATIENT
Start: 2019-02-22 | End: 2019-05-07 | Stop reason: SINTOL

## 2019-02-22 RX ORDER — PRAVASTATIN SODIUM 20 MG
TABLET ORAL
Qty: 90 TABLET | Refills: 3 | Status: SHIPPED | OUTPATIENT
Start: 2019-02-22 | End: 2019-05-31 | Stop reason: SDUPTHER

## 2019-03-19 ENCOUNTER — OFFICE VISIT (OUTPATIENT)
Dept: FAMILY MEDICINE CLINIC | Age: 69
End: 2019-03-19
Payer: MEDICARE

## 2019-03-19 VITALS
WEIGHT: 249 LBS | SYSTOLIC BLOOD PRESSURE: 126 MMHG | DIASTOLIC BLOOD PRESSURE: 74 MMHG | TEMPERATURE: 96.9 F | RESPIRATION RATE: 14 BRPM | BODY MASS INDEX: 33.72 KG/M2 | OXYGEN SATURATION: 97 % | HEIGHT: 72 IN | HEART RATE: 69 BPM

## 2019-03-19 DIAGNOSIS — J06.9 VIRAL URI: Primary | ICD-10-CM

## 2019-03-19 PROCEDURE — 3017F COLORECTAL CA SCREEN DOC REV: CPT | Performed by: NURSE PRACTITIONER

## 2019-03-19 PROCEDURE — 1101F PT FALLS ASSESS-DOCD LE1/YR: CPT | Performed by: NURSE PRACTITIONER

## 2019-03-19 PROCEDURE — G8482 FLU IMMUNIZE ORDER/ADMIN: HCPCS | Performed by: NURSE PRACTITIONER

## 2019-03-19 PROCEDURE — 1036F TOBACCO NON-USER: CPT | Performed by: NURSE PRACTITIONER

## 2019-03-19 PROCEDURE — G8598 ASA/ANTIPLAT THER USED: HCPCS | Performed by: NURSE PRACTITIONER

## 2019-03-19 PROCEDURE — G8417 CALC BMI ABV UP PARAM F/U: HCPCS | Performed by: NURSE PRACTITIONER

## 2019-03-19 PROCEDURE — 99213 OFFICE O/P EST LOW 20 MIN: CPT | Performed by: NURSE PRACTITIONER

## 2019-03-19 PROCEDURE — 4040F PNEUMOC VAC/ADMIN/RCVD: CPT | Performed by: NURSE PRACTITIONER

## 2019-03-19 PROCEDURE — 1123F ACP DISCUSS/DSCN MKR DOCD: CPT | Performed by: NURSE PRACTITIONER

## 2019-03-19 PROCEDURE — G8427 DOCREV CUR MEDS BY ELIG CLIN: HCPCS | Performed by: NURSE PRACTITIONER

## 2019-03-19 RX ORDER — BENZONATATE 100 MG/1
100 CAPSULE ORAL 3 TIMES DAILY PRN
Qty: 30 CAPSULE | Refills: 0 | Status: SHIPPED | OUTPATIENT
Start: 2019-03-19 | End: 2019-03-26

## 2019-03-19 ASSESSMENT — PATIENT HEALTH QUESTIONNAIRE - PHQ9
SUM OF ALL RESPONSES TO PHQ QUESTIONS 1-9: 0
SUM OF ALL RESPONSES TO PHQ9 QUESTIONS 1 & 2: 0
SUM OF ALL RESPONSES TO PHQ QUESTIONS 1-9: 0
1. LITTLE INTEREST OR PLEASURE IN DOING THINGS: 0
2. FEELING DOWN, DEPRESSED OR HOPELESS: 0

## 2019-03-19 ASSESSMENT — ENCOUNTER SYMPTOMS
COUGH: 1
WHEEZING: 0
RHINORRHEA: 1
SHORTNESS OF BREATH: 0

## 2019-05-03 ENCOUNTER — OFFICE VISIT (OUTPATIENT)
Dept: FAMILY MEDICINE CLINIC | Age: 69
End: 2019-05-03
Payer: MEDICARE

## 2019-05-03 VITALS
RESPIRATION RATE: 12 BRPM | OXYGEN SATURATION: 96 % | TEMPERATURE: 98.3 F | WEIGHT: 230 LBS | SYSTOLIC BLOOD PRESSURE: 120 MMHG | HEART RATE: 79 BPM | HEIGHT: 72 IN | DIASTOLIC BLOOD PRESSURE: 70 MMHG | BODY MASS INDEX: 31.15 KG/M2

## 2019-05-03 DIAGNOSIS — I48.0 PAROXYSMAL ATRIAL FIBRILLATION (HCC): ICD-10-CM

## 2019-05-03 DIAGNOSIS — Z23 NEED FOR SHINGLES VACCINE: ICD-10-CM

## 2019-05-03 DIAGNOSIS — I10 ESSENTIAL HYPERTENSION: ICD-10-CM

## 2019-05-03 DIAGNOSIS — Z12.5 PROSTATE CANCER SCREENING: ICD-10-CM

## 2019-05-03 DIAGNOSIS — I42.9 CARDIOMYOPATHY, UNSPECIFIED TYPE (HCC): ICD-10-CM

## 2019-05-03 DIAGNOSIS — I25.10 CORONARY ARTERY DISEASE INVOLVING NATIVE CORONARY ARTERY OF NATIVE HEART WITHOUT ANGINA PECTORIS: Primary | ICD-10-CM

## 2019-05-03 LAB
ALBUMIN SERPL-MCNC: 4.6 G/DL (ref 3.5–4.6)
ALP BLD-CCNC: 42 U/L (ref 35–104)
ALT SERPL-CCNC: 26 U/L (ref 0–41)
ANION GAP SERPL CALCULATED.3IONS-SCNC: 12 MEQ/L (ref 9–15)
AST SERPL-CCNC: 26 U/L (ref 0–40)
BASOPHILS ABSOLUTE: 0.1 K/UL (ref 0–0.2)
BASOPHILS RELATIVE PERCENT: 0.9 %
BILIRUB SERPL-MCNC: 0.4 MG/DL (ref 0.2–0.7)
BUN BLDV-MCNC: 41 MG/DL (ref 8–23)
CALCIUM SERPL-MCNC: 9.5 MG/DL (ref 8.5–9.9)
CHLORIDE BLD-SCNC: 102 MEQ/L (ref 95–107)
CHOLESTEROL, TOTAL: 141 MG/DL (ref 0–199)
CO2: 26 MEQ/L (ref 20–31)
CREAT SERPL-MCNC: 1.66 MG/DL (ref 0.7–1.2)
EOSINOPHILS ABSOLUTE: 0.2 K/UL (ref 0–0.7)
EOSINOPHILS RELATIVE PERCENT: 2.3 %
GFR AFRICAN AMERICAN: 49.8
GFR NON-AFRICAN AMERICAN: 41.1
GLOBULIN: 2.7 G/DL (ref 2.3–3.5)
GLUCOSE BLD-MCNC: 78 MG/DL (ref 70–99)
HCT VFR BLD CALC: 38.5 % (ref 42–52)
HDLC SERPL-MCNC: 39 MG/DL (ref 40–59)
HEMOGLOBIN: 13.2 G/DL (ref 14–18)
LDL CHOLESTEROL CALCULATED: 83 MG/DL (ref 0–129)
LYMPHOCYTES ABSOLUTE: 1.4 K/UL (ref 1–4.8)
LYMPHOCYTES RELATIVE PERCENT: 21.1 %
MCH RBC QN AUTO: 34.1 PG (ref 27–31.3)
MCHC RBC AUTO-ENTMCNC: 34.3 % (ref 33–37)
MCV RBC AUTO: 99.4 FL (ref 80–100)
MONOCYTES ABSOLUTE: 0.6 K/UL (ref 0.2–0.8)
MONOCYTES RELATIVE PERCENT: 9.1 %
NEUTROPHILS ABSOLUTE: 4.5 K/UL (ref 1.4–6.5)
NEUTROPHILS RELATIVE PERCENT: 66.6 %
PDW BLD-RTO: 13.6 % (ref 11.5–14.5)
PLATELET # BLD: 219 K/UL (ref 130–400)
POTASSIUM SERPL-SCNC: 6 MEQ/L (ref 3.4–4.9)
PROSTATE SPECIFIC ANTIGEN: 1.65 NG/ML (ref 0–6.22)
RBC # BLD: 3.88 M/UL (ref 4.7–6.1)
SODIUM BLD-SCNC: 140 MEQ/L (ref 135–144)
TOTAL PROTEIN: 7.3 G/DL (ref 6.3–8)
TRIGL SERPL-MCNC: 94 MG/DL (ref 0–150)
WBC # BLD: 6.8 K/UL (ref 4.8–10.8)

## 2019-05-03 PROCEDURE — 4040F PNEUMOC VAC/ADMIN/RCVD: CPT | Performed by: FAMILY MEDICINE

## 2019-05-03 PROCEDURE — 1123F ACP DISCUSS/DSCN MKR DOCD: CPT | Performed by: FAMILY MEDICINE

## 2019-05-03 PROCEDURE — G8598 ASA/ANTIPLAT THER USED: HCPCS | Performed by: FAMILY MEDICINE

## 2019-05-03 PROCEDURE — 3017F COLORECTAL CA SCREEN DOC REV: CPT | Performed by: FAMILY MEDICINE

## 2019-05-03 PROCEDURE — G8427 DOCREV CUR MEDS BY ELIG CLIN: HCPCS | Performed by: FAMILY MEDICINE

## 2019-05-03 PROCEDURE — G8417 CALC BMI ABV UP PARAM F/U: HCPCS | Performed by: FAMILY MEDICINE

## 2019-05-03 PROCEDURE — 99214 OFFICE O/P EST MOD 30 MIN: CPT | Performed by: FAMILY MEDICINE

## 2019-05-03 PROCEDURE — 1036F TOBACCO NON-USER: CPT | Performed by: FAMILY MEDICINE

## 2019-05-03 ASSESSMENT — ENCOUNTER SYMPTOMS
GASTROINTESTINAL NEGATIVE: 1
SHORTNESS OF BREATH: 0
EYES NEGATIVE: 1
ALLERGIC/IMMUNOLOGIC NEGATIVE: 1
RESPIRATORY NEGATIVE: 1

## 2019-05-03 NOTE — PROGRESS NOTES
Patient is seen in follow up for   Chief Complaint   Patient presents with    Hypertension     Follow up on HTN    Health Maintenance     declines all except shingrix     Other     GAP     Hypertension   This is a chronic problem. The current episode started more than 1 year ago. The problem is unchanged. The problem is controlled. Pertinent negatives include no chest pain, palpitations or shortness of breath. There are no associated agents to hypertension. Risk factors for coronary artery disease include male gender and diabetes mellitus. Past treatments include beta blockers and ACE inhibitors. The current treatment provides moderate improvement. There are no compliance problems. Other   Pertinent negatives include no chest pain, chills or fever.        Past Medical History:   Diagnosis Date    BBB (bundle branch block)     RIGHT    CAD (coronary artery disease) 1/16/2015    Cardiomyopathy, nonischemic ([facility] [address] 8/23/2018    Chronic kidney disease     GERD (gastroesophageal reflux disease)     Hyperlipidemia     Hypertension     Paroxysmal atrial fibrillation ([facility] [address] 5/18/2018    Pelvic fracture (HCC)     REMOTE    Retinal artery occlusion 1/16/2015     Patient Active Problem List    Diagnosis Date Noted    Cardiomyopathy [facility]) 08/23/2018    Paroxysmal atrial fibrillation ([facility] [address] 05/18/2018    Pacemaker 12/22/2015    CAD (coronary artery disease) 01/16/2015    Retinal artery occlusion 01/16/2015    Chronic low back pain 04/14/2014    Adjustment disorder with mixed anxiety and depressed mood 12/13/2012    BBB (bundle branch block)     Hypertension     Chronic kidney disease     Hyperlipidemia     GERD (gastroesophageal reflux disease)      Past Surgical History:   Procedure Laterality Date    ANGIOPLASTY      [facility]2016    [facility]    [facility]12[facility]2016    [facility]    ROTATOR CUFF REPAIR       Family History   Problem Relation Age of Onset    Heart Disease Mother Standing Status:   Future     Standing Expiration Date:   5/2/2020    Lipid Panel     Standing Status:   Future     Standing Expiration Date:   5/2/2020     Order Specific Question:   Is Patient Fasting?/# of Hours     Answer:   8    Psa screening     Standing Status:   Future     Standing Expiration Date:   5/2/2020     Orders Placed This Encounter   Medications    zoster recombinant adjuvanted vaccine (SHINGRIX) 50 MCG/0.5ML SUSR injection     Sig: Inject 0.5 mLs into the muscle once for 1 dose     Dispense:  0.5 mL     Refill:  1     No follow-ups on file.   Bia Salazar MD

## 2019-05-07 ENCOUNTER — OFFICE VISIT (OUTPATIENT)
Dept: FAMILY MEDICINE CLINIC | Age: 69
End: 2019-05-07
Payer: MEDICARE

## 2019-05-07 VITALS
SYSTOLIC BLOOD PRESSURE: 110 MMHG | HEART RATE: 111 BPM | BODY MASS INDEX: 31.29 KG/M2 | TEMPERATURE: 98 F | HEIGHT: 72 IN | RESPIRATION RATE: 16 BRPM | WEIGHT: 231 LBS | DIASTOLIC BLOOD PRESSURE: 80 MMHG | OXYGEN SATURATION: 98 %

## 2019-05-07 DIAGNOSIS — I10 ESSENTIAL HYPERTENSION: Primary | ICD-10-CM

## 2019-05-07 DIAGNOSIS — N18.9 CHRONIC KIDNEY DISEASE, UNSPECIFIED CKD STAGE: ICD-10-CM

## 2019-05-07 DIAGNOSIS — E87.6 HYPOKALEMIA: ICD-10-CM

## 2019-05-07 LAB
ANION GAP SERPL CALCULATED.3IONS-SCNC: 17 MEQ/L (ref 9–15)
BUN BLDV-MCNC: 29 MG/DL (ref 8–23)
CALCIUM SERPL-MCNC: 9.4 MG/DL (ref 8.5–9.9)
CHLORIDE BLD-SCNC: 100 MEQ/L (ref 95–107)
CO2: 24 MEQ/L (ref 20–31)
CREAT SERPL-MCNC: 1.27 MG/DL (ref 0.7–1.2)
GFR AFRICAN AMERICAN: >60
GFR NON-AFRICAN AMERICAN: 56
GLUCOSE BLD-MCNC: 68 MG/DL (ref 70–99)
POTASSIUM SERPL-SCNC: 5.1 MEQ/L (ref 3.4–4.9)
SODIUM BLD-SCNC: 141 MEQ/L (ref 135–144)

## 2019-05-07 PROCEDURE — G8417 CALC BMI ABV UP PARAM F/U: HCPCS | Performed by: NURSE PRACTITIONER

## 2019-05-07 PROCEDURE — 3017F COLORECTAL CA SCREEN DOC REV: CPT | Performed by: NURSE PRACTITIONER

## 2019-05-07 PROCEDURE — 1036F TOBACCO NON-USER: CPT | Performed by: NURSE PRACTITIONER

## 2019-05-07 PROCEDURE — 1123F ACP DISCUSS/DSCN MKR DOCD: CPT | Performed by: NURSE PRACTITIONER

## 2019-05-07 PROCEDURE — G8598 ASA/ANTIPLAT THER USED: HCPCS | Performed by: NURSE PRACTITIONER

## 2019-05-07 PROCEDURE — 4040F PNEUMOC VAC/ADMIN/RCVD: CPT | Performed by: NURSE PRACTITIONER

## 2019-05-07 PROCEDURE — 99213 OFFICE O/P EST LOW 20 MIN: CPT | Performed by: NURSE PRACTITIONER

## 2019-05-07 PROCEDURE — G8427 DOCREV CUR MEDS BY ELIG CLIN: HCPCS | Performed by: NURSE PRACTITIONER

## 2019-05-07 RX ORDER — METOPROLOL SUCCINATE 50 MG/1
TABLET, EXTENDED RELEASE ORAL
Qty: 90 TABLET | Refills: 0 | Status: SHIPPED | OUTPATIENT
Start: 2019-05-07 | End: 2019-05-31 | Stop reason: SDUPTHER

## 2019-05-07 ASSESSMENT — ENCOUNTER SYMPTOMS
WHEEZING: 0
ALLERGIC/IMMUNOLOGIC NEGATIVE: 1
COUGH: 0
SHORTNESS OF BREATH: 0
CHEST TIGHTNESS: 0
EYES NEGATIVE: 1
GASTROINTESTINAL NEGATIVE: 1

## 2019-05-07 NOTE — PROGRESS NOTES
REPAIR          Family History   Problem Relation Age of Onset    Heart Disease Mother     Kidney Disease Father      Social History     Socioeconomic History    Marital status:      Spouse name: Not on file    Number of children: Not on file    Years of education: Not on file    Highest education level: Not on file   Occupational History    Not on file   Social Needs    Financial resource strain: Not on file    Food insecurity:     Worry: Not on file     Inability: Not on file    Transportation needs:     Medical: Not on file     Non-medical: Not on file   Tobacco Use    Smoking status: Former Smoker     Packs/day: 1.00     Years: 40.00     Pack years: 40.00     Start date:      Last attempt to quit: 2007     Years since quittin.6    Smokeless tobacco: Never Used   Substance and Sexual Activity    Alcohol use: Yes    Drug use: No    Sexual activity: Not on file   Lifestyle    Physical activity:     Days per week: Not on file     Minutes per session: Not on file    Stress: Not on file   Relationships    Social connections:     Talks on phone: Not on file     Gets together: Not on file     Attends Alevism service: Not on file     Active member of club or organization: Not on file     Attends meetings of clubs or organizations: Not on file     Relationship status: Not on file    Intimate partner violence:     Fear of current or ex partner: Not on file     Emotionally abused: Not on file     Physically abused: Not on file     Forced sexual activity: Not on file   Other Topics Concern    Not on file   Social History Narrative    Not on file     Allergies:  Patient has no known allergies. Review of Systems   Constitutional: Negative for activity change, appetite change, chills, fever and unexpected weight change. HENT: Negative. Eyes: Negative. Respiratory: Negative for cough, chest tightness, shortness of breath and wheezing.     Cardiovascular: Negative for chest tablet     Sig: TAKE ONE TABLET BY MOUTH DAILY     Dispense:  90 tablet     Refill:  0     Medications Discontinued During This Encounter   Medication Reason    lisinopril (PRINIVIL;ZESTRIL) 5 MG tablet Side effects    metoprolol succinate (TOPROL XL) 25 MG extended release tablet REORDER     Return for appt [facility] in 2 weeks. Reviewed with the patient: currentclinical status, medications, activities and diet. Side effects, adverse effects of the medicationprescribed today, as well as treatment plan/ rationale and result expectations havebeen discussed with the patient who expresses understanding and desires to proceed. Pt instructions reviewed and given to patient.     Close follow up to evaluate treatment resultsand for coordination of care. I have reviewed the patient's medical historyin detail and updated the computerized patient record.     KRISTEN Olivarez - CNP

## 2019-05-31 ENCOUNTER — OFFICE VISIT (OUTPATIENT)
Dept: CARDIOLOGY CLINIC | Age: 69
End: 2019-05-31
Payer: MEDICARE

## 2019-05-31 VITALS
OXYGEN SATURATION: 96 % | HEART RATE: 67 BPM | WEIGHT: 227 LBS | RESPIRATION RATE: 16 BRPM | DIASTOLIC BLOOD PRESSURE: 72 MMHG | BODY MASS INDEX: 30.75 KG/M2 | SYSTOLIC BLOOD PRESSURE: 128 MMHG | HEIGHT: 72 IN

## 2019-05-31 DIAGNOSIS — N18.9 CHRONIC KIDNEY DISEASE, UNSPECIFIED CKD STAGE: ICD-10-CM

## 2019-05-31 DIAGNOSIS — I10 ESSENTIAL HYPERTENSION: ICD-10-CM

## 2019-05-31 DIAGNOSIS — I48.0 PAROXYSMAL ATRIAL FIBRILLATION (HCC): Primary | ICD-10-CM

## 2019-05-31 DIAGNOSIS — I25.10 CORONARY ARTERY DISEASE INVOLVING NATIVE CORONARY ARTERY OF NATIVE HEART WITHOUT ANGINA PECTORIS: ICD-10-CM

## 2019-05-31 DIAGNOSIS — Z95.0 PACEMAKER: ICD-10-CM

## 2019-05-31 DIAGNOSIS — E78.5 HYPERLIPIDEMIA, UNSPECIFIED HYPERLIPIDEMIA TYPE: ICD-10-CM

## 2019-05-31 PROCEDURE — 4040F PNEUMOC VAC/ADMIN/RCVD: CPT | Performed by: INTERNAL MEDICINE

## 2019-05-31 PROCEDURE — 3017F COLORECTAL CA SCREEN DOC REV: CPT | Performed by: INTERNAL MEDICINE

## 2019-05-31 PROCEDURE — G8427 DOCREV CUR MEDS BY ELIG CLIN: HCPCS | Performed by: INTERNAL MEDICINE

## 2019-05-31 PROCEDURE — 99213 OFFICE O/P EST LOW 20 MIN: CPT | Performed by: INTERNAL MEDICINE

## 2019-05-31 PROCEDURE — G8417 CALC BMI ABV UP PARAM F/U: HCPCS | Performed by: INTERNAL MEDICINE

## 2019-05-31 PROCEDURE — 1123F ACP DISCUSS/DSCN MKR DOCD: CPT | Performed by: INTERNAL MEDICINE

## 2019-05-31 PROCEDURE — 1036F TOBACCO NON-USER: CPT | Performed by: INTERNAL MEDICINE

## 2019-05-31 PROCEDURE — G8598 ASA/ANTIPLAT THER USED: HCPCS | Performed by: INTERNAL MEDICINE

## 2019-05-31 RX ORDER — PRAVASTATIN SODIUM 20 MG
TABLET ORAL
Qty: 90 TABLET | Refills: 3 | Status: SHIPPED | OUTPATIENT
Start: 2019-05-31 | End: 2020-01-13 | Stop reason: SDUPTHER

## 2019-05-31 RX ORDER — METOPROLOL SUCCINATE 50 MG/1
TABLET, EXTENDED RELEASE ORAL
Qty: 90 TABLET | Refills: 3 | Status: SHIPPED | OUTPATIENT
Start: 2019-05-31 | End: 2020-01-13 | Stop reason: SDUPTHER

## 2019-05-31 NOTE — PROGRESS NOTES
PND, orthopnea, or claudication. No nitro use. BP and hr are good. CAD is stable. No LE discoloration or ulcers. No LE edema. No CHF type symptoms. Lipid profile is normal. No recent hospitalization. Hx of Pafib s/p PPM. On DOAC, no bleeding issues. Patient Active Problem List   Diagnosis    BBB (bundle branch block)    Hypertension    Chronic kidney disease    Hyperlipidemia    GERD (gastroesophageal reflux disease)    Adjustment disorder with mixed anxiety and depressed mood    Chronic low back pain    CAD (coronary artery disease)    Retinal artery occlusion    Pacemaker    Paroxysmal atrial fibrillation (HCC)    Cardiomyopathy [facility])       Past Surgical History:   Procedure Laterality Date    ANGIOPLASTY      [facility]    [facility] Right [facility]    [facility]    ROTATOR CUFF REPAIR         Social History     Socioeconomic History    Marital status:      Spouse name: None    Number of children: None    Years of education: None    Highest education level: None   Occupational History    None   Social Needs    Financial resource strain: None    Food insecurity:     Worry: None     Inability: None    Transportation needs:     Medical: None     Non-medical: None   Tobacco Use    Smoking status: Former Smoker     Packs/day: 1.00     Years: 40.00     Pack years: 40.00     Start date:      Last attempt to quit: 2007     Years since quittin.7    Smokeless tobacco: Never Used   Substance and Sexual Activity    Alcohol use:  Yes    Drug use: No    Sexual activity: None   Lifestyle    Physical activity:     Days per week: None     Minutes per session: None    Stress: None   Relationships    Social connections:     Talks on phone: None     Gets together: None     Attends Moravian service: None     Active member of club or organization: None     Attends meetings of clubs or organizations: None     Relationship status: None    Intimate partner violence:     Fear of current or ex partner: None     Emotionally abused: None     Physically abused: None     Forced sexual activity: None   Other Topics Concern    None   Social History Narrative    None       Family History   Problem Relation Age of Onset    Heart Disease Mother     Kidney Disease Father        Current Outpatient Medications   Medication Sig Dispense Refill    metoprolol succinate (TOPROL XL) 50 MG extended release tablet TAKE ONE TABLET BY MOUTH DAILY 90 tablet 0    pravastatin (PRAVACHOL) 20 MG tablet TAKE ONE TABLET BY MOUTH EVERY DAY 90 tablet 3    omeprazole (PRILOSEC) 40 MG delayed release capsule TAKE ONE CAPSULE BY MOUTH EVERY DAY 90 capsule 2    naproxen (NAPROSYN) 500 MG tablet Take 500 mg by mouth 2 qd      allopurinol (ZYLOPRIM) 300 MG tablet TAKE ONE TABLET BY MOUTH EVERY DAY 90 tablet 1    levETIRAcetam (KEPPRA) 250 MG tablet TAKE ONE TABLET BY MOUTH TWO TIMES A  tablet 0    XARELTO 20 MG TABS tablet TAKE ONE TABLET BY MOUTH EVERY DAY  3    colchicine (COLCRYS) 0.6 MG tablet Take 1 tablet by mouth daily as needed for Pain 30 tablet 3     No current facility-administered medications for this visit. Patient has no known allergies. Review of Systems:  General ROS: negative  Psychological ROS: negative  Hematological and Lymphatic ROS: No history of blood clots or bleeding disorder. Respiratory ROS: no cough, shortness of breath, or wheezing  Cardiovascular ROS: no chest pain or dyspnea on exertion  negative  Gastrointestinal ROS: no abdominal pain, change in bowel habits, or black or bloody stools  Genito-Urinary ROS: no dysuria, trouble voiding, or hematuria  Musculoskeletal ROS: negative  Neurological ROS: no TIA or stroke symptoms  Dermatological ROS: negative    VITALS:  Blood pressure 128/72, pulse 67, resp. rate 16, height 6' (1.829 m), weight 227 lb (103 kg), SpO2 96 %. Body mass index is 30.79 kg/m².     Physical Examination:  General need to continue to monitor muscle and liver enzymes, BUN, CR, and electrolytes. Details of medical condition explained and patient was warned about adverse consequences of uncontrolled medical conditions and possible side effects of prescribed medications. Patient was advised to go to the ER if he starts experiencing adverse effects of the medications. patient was instructed to call us back or go to nearby emergency room immediately if symptoms get worse or do not improve. Thank you for allowing me to participate in the care of your patient, please don't hesitate to contact me if you have any further questions.

## 2019-08-04 DIAGNOSIS — I10 ESSENTIAL HYPERTENSION: ICD-10-CM

## 2019-08-05 RX ORDER — METOPROLOL SUCCINATE 50 MG/1
TABLET, EXTENDED RELEASE ORAL
Qty: 90 TABLET | Refills: 3 | Status: SHIPPED | OUTPATIENT
Start: 2019-08-05 | End: 2019-09-05 | Stop reason: SDUPTHER

## 2019-09-05 ENCOUNTER — OFFICE VISIT (OUTPATIENT)
Dept: FAMILY MEDICINE CLINIC | Age: 69
End: 2019-09-05
Payer: MEDICARE

## 2019-09-05 VITALS
SYSTOLIC BLOOD PRESSURE: 128 MMHG | WEIGHT: 225.4 LBS | DIASTOLIC BLOOD PRESSURE: 70 MMHG | TEMPERATURE: 97.6 F | OXYGEN SATURATION: 98 % | HEIGHT: 72 IN | BODY MASS INDEX: 30.53 KG/M2 | HEART RATE: 86 BPM

## 2019-09-05 DIAGNOSIS — T14.8XXA SCRATCH MARK: Primary | ICD-10-CM

## 2019-09-05 PROCEDURE — 3288F FALL RISK ASSESSMENT DOCD: CPT | Performed by: NURSE PRACTITIONER

## 2019-09-05 PROCEDURE — 99213 OFFICE O/P EST LOW 20 MIN: CPT | Performed by: NURSE PRACTITIONER

## 2019-09-05 ASSESSMENT — ENCOUNTER SYMPTOMS
NAUSEA: 0
COUGH: 0
SHORTNESS OF BREATH: 0
COLOR CHANGE: 0
VOMITING: 0
DIARRHEA: 0
CONSTIPATION: 0
SORE THROAT: 0
TROUBLE SWALLOWING: 0
ABDOMINAL PAIN: 0
BACK PAIN: 0
VOICE CHANGE: 0

## 2019-10-14 RX ORDER — OMEPRAZOLE 40 MG/1
CAPSULE, DELAYED RELEASE ORAL
Qty: 90 CAPSULE | Refills: 2 | Status: SHIPPED | OUTPATIENT
Start: 2019-10-14 | End: 2020-01-14 | Stop reason: SDUPTHER

## 2019-11-04 ENCOUNTER — OFFICE VISIT (OUTPATIENT)
Dept: FAMILY MEDICINE CLINIC | Age: 69
End: 2019-11-04
Payer: MEDICARE

## 2019-11-04 VITALS
OXYGEN SATURATION: 94 % | WEIGHT: 225 LBS | HEIGHT: 72 IN | TEMPERATURE: 98.1 F | BODY MASS INDEX: 30.48 KG/M2 | DIASTOLIC BLOOD PRESSURE: 80 MMHG | HEART RATE: 74 BPM | SYSTOLIC BLOOD PRESSURE: 99 MMHG

## 2019-11-04 VITALS
OXYGEN SATURATION: 94 % | SYSTOLIC BLOOD PRESSURE: 98 MMHG | TEMPERATURE: 98.1 F | BODY MASS INDEX: 30.48 KG/M2 | WEIGHT: 225 LBS | HEIGHT: 72 IN | RESPIRATION RATE: 18 BRPM | DIASTOLIC BLOOD PRESSURE: 80 MMHG | HEART RATE: 74 BPM

## 2019-11-04 DIAGNOSIS — Z00.00 ROUTINE GENERAL MEDICAL EXAMINATION AT A HEALTH CARE FACILITY: Primary | ICD-10-CM

## 2019-11-04 DIAGNOSIS — I42.9 CARDIOMYOPATHY, UNSPECIFIED TYPE (HCC): ICD-10-CM

## 2019-11-04 DIAGNOSIS — I10 ESSENTIAL HYPERTENSION: Primary | ICD-10-CM

## 2019-11-04 DIAGNOSIS — I48.0 PAROXYSMAL ATRIAL FIBRILLATION (HCC): ICD-10-CM

## 2019-11-04 PROCEDURE — 99213 OFFICE O/P EST LOW 20 MIN: CPT | Performed by: FAMILY MEDICINE

## 2019-11-04 PROCEDURE — G0438 PPPS, INITIAL VISIT: HCPCS | Performed by: NURSE PRACTITIONER

## 2019-11-04 PROCEDURE — G8417 CALC BMI ABV UP PARAM F/U: HCPCS | Performed by: FAMILY MEDICINE

## 2019-11-04 PROCEDURE — G8427 DOCREV CUR MEDS BY ELIG CLIN: HCPCS | Performed by: FAMILY MEDICINE

## 2019-11-04 PROCEDURE — 1036F TOBACCO NON-USER: CPT | Performed by: FAMILY MEDICINE

## 2019-11-04 PROCEDURE — 3017F COLORECTAL CA SCREEN DOC REV: CPT | Performed by: FAMILY MEDICINE

## 2019-11-04 PROCEDURE — 3017F COLORECTAL CA SCREEN DOC REV: CPT | Performed by: NURSE PRACTITIONER

## 2019-11-04 PROCEDURE — G8598 ASA/ANTIPLAT THER USED: HCPCS | Performed by: NURSE PRACTITIONER

## 2019-11-04 PROCEDURE — 4040F PNEUMOC VAC/ADMIN/RCVD: CPT | Performed by: NURSE PRACTITIONER

## 2019-11-04 PROCEDURE — G8482 FLU IMMUNIZE ORDER/ADMIN: HCPCS | Performed by: NURSE PRACTITIONER

## 2019-11-04 PROCEDURE — G8482 FLU IMMUNIZE ORDER/ADMIN: HCPCS | Performed by: FAMILY MEDICINE

## 2019-11-04 PROCEDURE — 4040F PNEUMOC VAC/ADMIN/RCVD: CPT | Performed by: FAMILY MEDICINE

## 2019-11-04 PROCEDURE — 1123F ACP DISCUSS/DSCN MKR DOCD: CPT | Performed by: NURSE PRACTITIONER

## 2019-11-04 PROCEDURE — 1123F ACP DISCUSS/DSCN MKR DOCD: CPT | Performed by: FAMILY MEDICINE

## 2019-11-04 PROCEDURE — G8598 ASA/ANTIPLAT THER USED: HCPCS | Performed by: FAMILY MEDICINE

## 2019-11-04 ASSESSMENT — LIFESTYLE VARIABLES
HOW OFTEN DURING THE LAST YEAR HAVE YOU NEEDED AN ALCOHOLIC DRINK FIRST THING IN THE MORNING TO GET YOURSELF GOING AFTER A NIGHT OF HEAVY DRINKING: 0
HAS A RELATIVE, FRIEND, DOCTOR, OR ANOTHER HEALTH PROFESSIONAL EXPRESSED CONCERN ABOUT YOUR DRINKING OR SUGGESTED YOU CUT DOWN: 0
HOW OFTEN DURING THE LAST YEAR HAVE YOU HAD A FEELING OF GUILT OR REMORSE AFTER DRINKING: 0
HOW MANY STANDARD DRINKS CONTAINING ALCOHOL DO YOU HAVE ON A TYPICAL DAY: 0
HAVE YOU OR SOMEONE ELSE BEEN INJURED AS A RESULT OF YOUR DRINKING: 0
HOW OFTEN DURING THE LAST YEAR HAVE YOU BEEN UNABLE TO REMEMBER WHAT HAPPENED THE NIGHT BEFORE BECAUSE YOU HAD BEEN DRINKING: 0
HOW OFTEN DURING THE LAST YEAR HAVE YOU FAILED TO DO WHAT WAS NORMALLY EXPECTED FROM YOU BECAUSE OF DRINKING: 0
HOW OFTEN DO YOU HAVE SIX OR MORE DRINKS ON ONE OCCASION: 0
HOW OFTEN DURING THE LAST YEAR HAVE YOU FOUND THAT YOU WERE NOT ABLE TO STOP DRINKING ONCE YOU HAD STARTED: 0
HOW OFTEN DO YOU HAVE A DRINK CONTAINING ALCOHOL: 3
AUDIT TOTAL SCORE: 3
AUDIT-C TOTAL SCORE: 3

## 2019-11-04 ASSESSMENT — ENCOUNTER SYMPTOMS
SHORTNESS OF BREATH: 0
ALLERGIC/IMMUNOLOGIC NEGATIVE: 1
RESPIRATORY NEGATIVE: 1
GASTROINTESTINAL NEGATIVE: 1
EYES NEGATIVE: 1

## 2019-11-04 ASSESSMENT — PATIENT HEALTH QUESTIONNAIRE - PHQ9
SUM OF ALL RESPONSES TO PHQ QUESTIONS 1-9: 0
SUM OF ALL RESPONSES TO PHQ QUESTIONS 1-9: 0

## 2019-12-27 ENCOUNTER — OFFICE VISIT (OUTPATIENT)
Dept: CARDIOLOGY CLINIC | Age: 69
End: 2019-12-27
Payer: MEDICARE

## 2019-12-27 VITALS
WEIGHT: 228.6 LBS | HEART RATE: 70 BPM | SYSTOLIC BLOOD PRESSURE: 120 MMHG | DIASTOLIC BLOOD PRESSURE: 80 MMHG | BODY MASS INDEX: 31 KG/M2

## 2019-12-27 DIAGNOSIS — I42.9 CARDIOMYOPATHY, UNSPECIFIED TYPE (HCC): ICD-10-CM

## 2019-12-27 DIAGNOSIS — I25.10 CORONARY ARTERY DISEASE INVOLVING NATIVE CORONARY ARTERY OF NATIVE HEART WITHOUT ANGINA PECTORIS: ICD-10-CM

## 2019-12-27 DIAGNOSIS — N18.9 CHRONIC KIDNEY DISEASE, UNSPECIFIED CKD STAGE: ICD-10-CM

## 2019-12-27 DIAGNOSIS — I48.0 PAROXYSMAL ATRIAL FIBRILLATION (HCC): Primary | ICD-10-CM

## 2019-12-27 DIAGNOSIS — I10 ESSENTIAL HYPERTENSION: ICD-10-CM

## 2019-12-27 DIAGNOSIS — Z95.0 PACEMAKER: ICD-10-CM

## 2019-12-27 DIAGNOSIS — E78.5 HYPERLIPIDEMIA, UNSPECIFIED HYPERLIPIDEMIA TYPE: ICD-10-CM

## 2019-12-27 PROCEDURE — 1036F TOBACCO NON-USER: CPT | Performed by: INTERNAL MEDICINE

## 2019-12-27 PROCEDURE — 99214 OFFICE O/P EST MOD 30 MIN: CPT | Performed by: INTERNAL MEDICINE

## 2019-12-27 PROCEDURE — G8427 DOCREV CUR MEDS BY ELIG CLIN: HCPCS | Performed by: INTERNAL MEDICINE

## 2019-12-27 PROCEDURE — 1123F ACP DISCUSS/DSCN MKR DOCD: CPT | Performed by: INTERNAL MEDICINE

## 2019-12-27 PROCEDURE — 3017F COLORECTAL CA SCREEN DOC REV: CPT | Performed by: INTERNAL MEDICINE

## 2019-12-27 PROCEDURE — G8417 CALC BMI ABV UP PARAM F/U: HCPCS | Performed by: INTERNAL MEDICINE

## 2019-12-27 PROCEDURE — 93000 ELECTROCARDIOGRAM COMPLETE: CPT | Performed by: INTERNAL MEDICINE

## 2019-12-27 PROCEDURE — G8482 FLU IMMUNIZE ORDER/ADMIN: HCPCS | Performed by: INTERNAL MEDICINE

## 2019-12-27 PROCEDURE — G8598 ASA/ANTIPLAT THER USED: HCPCS | Performed by: INTERNAL MEDICINE

## 2019-12-27 PROCEDURE — 4040F PNEUMOC VAC/ADMIN/RCVD: CPT | Performed by: INTERNAL MEDICINE

## 2020-01-13 ENCOUNTER — PATIENT MESSAGE (OUTPATIENT)
Dept: FAMILY MEDICINE CLINIC | Age: 70
End: 2020-01-13

## 2020-01-13 RX ORDER — PRAVASTATIN SODIUM 20 MG
TABLET ORAL
Qty: 90 TABLET | Refills: 3 | Status: SHIPPED | OUTPATIENT
Start: 2020-01-13 | End: 2020-12-11 | Stop reason: SDUPTHER

## 2020-01-13 RX ORDER — METOPROLOL SUCCINATE 50 MG/1
TABLET, EXTENDED RELEASE ORAL
Qty: 90 TABLET | Refills: 3 | Status: SHIPPED | OUTPATIENT
Start: 2020-01-13 | End: 2020-12-11 | Stop reason: SDUPTHER

## 2020-01-13 NOTE — TELEPHONE ENCOUNTER
From: Ivan Bateman  To: Mellissa Ruiz MD  Sent: 1/13/2020 9:33 AM EST  Subject: Prescription Question    This is actually not a prescription question but a prescription request. I have changed prescription providers and they need a new 90-day prescription faxed to them, along with refills for the Omeprazole, 40 mg capsule dr, 1 capsule per day, for 90 days. My new provider is [facility], and their fax number is [phone] (This was originally through [facility] but they recently changed to [facility]). If you need further information concerning this provider, please contact my wife, Maico Willson, at [phone]. thank you.

## 2020-01-14 RX ORDER — OMEPRAZOLE 40 MG/1
CAPSULE, DELAYED RELEASE ORAL
Qty: 90 CAPSULE | Refills: 2 | Status: ON HOLD | OUTPATIENT
Start: 2020-01-14 | End: 2020-10-05

## 2020-02-11 ENCOUNTER — TELEPHONE (OUTPATIENT)
Dept: CARDIOLOGY CLINIC | Age: 70
End: 2020-02-11

## 2020-02-11 NOTE — TELEPHONE ENCOUNTER
Patient was seen today with Dr. Justine Barger @ [address] [place] [address]. He has excess skin that droops over his right eye, and is going to have surgery to tuck the excess skin. Patient's wife states that they need ok to hold xarelto 2 days prior and 2 days after the surgery. It is not scheduled yet. Per patient's wife, they should also be faxing a form to be signed, but wanted you to be aware.

## 2020-05-01 ENCOUNTER — TELEMEDICINE (OUTPATIENT)
Dept: FAMILY MEDICINE CLINIC | Age: 70
End: 2020-05-01

## 2020-05-01 ENCOUNTER — OFFICE VISIT (OUTPATIENT)
Dept: FAMILY MEDICINE CLINIC | Age: 70
End: 2020-05-01
Payer: MEDICARE

## 2020-05-01 VITALS
HEIGHT: 72 IN | OXYGEN SATURATION: 98 % | SYSTOLIC BLOOD PRESSURE: 120 MMHG | BODY MASS INDEX: 30.61 KG/M2 | WEIGHT: 226 LBS | DIASTOLIC BLOOD PRESSURE: 70 MMHG | RESPIRATION RATE: 14 BRPM | HEART RATE: 59 BPM | TEMPERATURE: 97 F

## 2020-05-01 PROCEDURE — 4040F PNEUMOC VAC/ADMIN/RCVD: CPT | Performed by: NURSE PRACTITIONER

## 2020-05-01 PROCEDURE — G8510 SCR DEP NEG, NO PLAN REQD: HCPCS | Performed by: NURSE PRACTITIONER

## 2020-05-01 PROCEDURE — 1123F ACP DISCUSS/DSCN MKR DOCD: CPT | Performed by: NURSE PRACTITIONER

## 2020-05-01 PROCEDURE — 99213 OFFICE O/P EST LOW 20 MIN: CPT | Performed by: NURSE PRACTITIONER

## 2020-05-01 PROCEDURE — G8427 DOCREV CUR MEDS BY ELIG CLIN: HCPCS | Performed by: NURSE PRACTITIONER

## 2020-05-01 PROCEDURE — 1036F TOBACCO NON-USER: CPT | Performed by: NURSE PRACTITIONER

## 2020-05-01 PROCEDURE — G8417 CALC BMI ABV UP PARAM F/U: HCPCS | Performed by: NURSE PRACTITIONER

## 2020-05-01 PROCEDURE — 3017F COLORECTAL CA SCREEN DOC REV: CPT | Performed by: NURSE PRACTITIONER

## 2020-05-01 RX ORDER — LISINOPRIL 5 MG/1
TABLET ORAL
COMMUNITY
End: 2020-06-16 | Stop reason: ALTCHOICE

## 2020-05-01 RX ORDER — SULFAMETHOXAZOLE AND TRIMETHOPRIM 800; 160 MG/1; MG/1
1 TABLET ORAL 2 TIMES DAILY
Qty: 14 TABLET | Refills: 0 | Status: SHIPPED | OUTPATIENT
Start: 2020-05-01 | End: 2020-05-12 | Stop reason: SDUPTHER

## 2020-05-01 ASSESSMENT — ENCOUNTER SYMPTOMS
SINUS PAIN: 0
COLOR CHANGE: 1
CONSTIPATION: 0
WHEEZING: 0
COLOR CHANGE: 0
TROUBLE SWALLOWING: 0
APNEA: 0
SHORTNESS OF BREATH: 0
ABDOMINAL DISTENTION: 0

## 2020-05-01 NOTE — PATIENT INSTRUCTIONS
Patient Education        Subungual Hematoma: Care Instructions  Your Care Instructions  A subungual hematoma is blood under a fingernail or toenail. It's caused by hitting the nail with an object such as a hammer. Or it can happen if you pinch it in a door or drawer. The hematoma can cause throbbing pain in the hurt finger or toe. Your doctor may have relieved the pain by making a small hole in the nail. This lets the blood drain out. You may have had a shot to prevent a tetanus infection. Follow-up care is a key part of your treatment and safety. Be sure to make and go to all appointments, and call your doctor if you are having problems. It's also a good idea to know your test results and keep a list of the medicines you take. How can you care for yourself at home? If your doctor told you how to care for your wound, follow your doctor's instructions. If you did not get instructions, follow this general advice:  · Wash the wound with clean water 2 times a day. Keep it clean and dry the rest of the time. Don't use hydrogen peroxide or alcohol, which can slow healing. · You may cover the wound with a nonstick bandage. When should you call for help? Call your doctor now or seek immediate medical care if:    · You have symptoms of infection, such as:  ? Increased pain, swelling, warmth, or redness. ? Red streaks leading from the area. ? Pus draining from the area. ? A fever.    Watch closely for changes in your health, and be sure to contact your doctor if:    · You do not get better as expected. Where can you learn more? Go to https://[employer]. org and sign in to your [facility] account. Enter [phone] in the [facility] box to learn more about \"Subungual Hematoma: Care Instructions. \"     If you do not have an account, please click on the \"Sign Up Now\" link. Current as of: October 30, 2019Content Version: 12.4  © 4847-2867 [employer].   Care instructions adapted

## 2020-05-01 NOTE — PROGRESS NOTES
Subjective:      Patient ID: Memo Lester is a 69 y.o. male who presents today for:  Chief Complaint   Patient presents with    Toe Injury     pt states he injured his left greater toe in dec of 2019 and now the toe is discolored and swollen       HPI     Patient presents to [address] today with a left great toe hematoma that he reports getting it stuck in his jeans then hitting his toe into the floor that took place Dec.  Patient reports the toe was possibly broken and noticed swelling which went away and he reports it got better but now noticed a hematoma under nail that has been there for several weeks. Patient positive for hematoma under his left great toe with some swelling/pressure noted around his toe and erythema and pressure per pt. Patient negative for any drainage, fever, chills, SOB, numbness,  or tingling. Patient reports wearing shoes aggravates his left great toe and he denies any home treatments. No improvement noted to his toe and patient denies any problems with his feet or toes prior to the injury in December.     Past Medical History:   Diagnosis Date    BBB (bundle branch block)     RIGHT    CAD (coronary artery disease) 1/16/2015    Cardiomyopathy, nonischemic ([facility] [address] 8/23/2018    Chronic kidney disease     GERD (gastroesophageal reflux disease)     Hyperlipidemia     Hypertension     Paroxysmal atrial fibrillation ([facility] [address] 5/18/2018    Pelvic fracture (HCC)     REMOTE    Retinal artery occlusion 1/16/2015     Past Surgical History:   Procedure Laterality Date    ANGIOPLASTY      [facility]    [facility]    ROTATOR CUFF REPAIR       Social History     Socioeconomic History    Marital status:      Spouse name: Not on file    Number of children: Not on file    Years of education: Not on file    Highest education level: Not on file   Occupational History    Not on file   Social Needs    Financial resource strain: Not on file    Food insecurity     Worry: Not on file     Inability: Not on file    Transportation needs     Medical: Not on file     Non-medical: Not on file   Tobacco Use    Smoking status: Former Smoker     Packs/day: 1.00     Years: 40.00     Pack years: 40.00     Start date: 5     Last attempt to quit: 2007     Years since quittin.6    Smokeless tobacco: Never Used   Substance and Sexual Activity    Alcohol use: Yes    Drug use: No    Sexual activity: Not on file   Lifestyle    Physical activity     Days per week: Not on file     Minutes per session: Not on file    Stress: Not on file   Relationships    Social connections     Talks on phone: Not on file     Gets together: Not on file     Attends Faith service: Not on file     Active member of club or organization: Not on file     Attends meetings of clubs or organizations: Not on file     Relationship status: Not on file    Intimate partner violence     Fear of current or ex partner: Not on file     Emotionally abused: Not on file     Physically abused: Not on file     Forced sexual activity: Not on file   Other Topics Concern    Not on file   Social History Narrative    Not on file     Family History   Problem Relation Age of Onset    Heart Disease Mother     Kidney Disease Father      No Known Allergies      Review of Systems   Constitutional: Negative for activity change, appetite change, fatigue and fever. HENT: Negative for drooling, ear pain, hearing loss, sinus pain and trouble swallowing. Eyes: Negative for visual disturbance. Respiratory: Negative for apnea, shortness of breath and wheezing. Cardiovascular: Negative for chest pain. Gastrointestinal: Negative for abdominal distention and constipation. Endocrine: Negative for polydipsia, polyphagia and polyuria. Genitourinary: Negative for decreased urine volume, difficulty urinating and flank pain.    Musculoskeletal: Negative for gait problem, myalgias and integrity: Erythema (noted around pt's injured left great toe with the hematoma) and warmth (to patient's left great toe) present. Comments: Patient tolerated the procedure done in office today without any problems noted. Skin:     General: Skin is warm and dry. Capillary Refill: Capillary refill takes less than 2 seconds. Findings: Erythema (noted around patient's left great toe) present. Neurological:      General: No focal deficit present. Mental Status: He is alert and oriented to person, place, and time. Mental status is at baseline. Motor: No weakness. Coordination: Coordination normal.   Psychiatric:         Mood and Affect: Mood normal.         Behavior: Behavior is cooperative. Assessment:       Diagnosis Orders   1. Subungual hematoma of great toe of left foot, initial encounter  sulfamethoxazole-trimethoprim (BACTRIM DS) 800-160 MG per tablet         Plan:      No orders of the defined types were placed in this encounter. Orders Placed This Encounter   Medications    sulfamethoxazole-trimethoprim (BACTRIM DS) 800-160 MG per tablet     Sig: Take 1 tablet by mouth 2 times daily for 7 days     Dispense:  14 tablet     Refill:  0   Patient presents today after having a VV with RADHA Ulloa about his toe. Patient was advised to come here and get the left great toe examined with possible need for a release of pressure and drainage to the nail. On exam patient was explained that we did need to drill, express and drain the left great toe. Patient was walked through the process and lidocaine numbing spray was used to his left great toe and an 18g needle to express the pressure and allow for drainage. Patient tolerated the procedure well and was given an ATB prophylactically. Discussed with patient that this toe will still drain and he can soak it and to make sure to keep the toe clean.   Patiet verbalized understanding and stated he already has a follow up appt

## 2020-05-01 NOTE — PROGRESS NOTES
education level: Not on file   Occupational History    Not on file   Social Needs    Financial resource strain: Not on file    Food insecurity     Worry: Not on file     Inability: Not on file    Transportation needs     Medical: Not on file     Non-medical: Not on file   Tobacco Use    Smoking status: Former Smoker     Packs/day: 1.00     Years: 40.00     Pack years: 40.00     Start date:      Last attempt to quit: 2007     Years since quittin.6    Smokeless tobacco: Never Used   Substance and Sexual Activity    Alcohol use: Yes    Drug use: No    Sexual activity: Not on file   Lifestyle    Physical activity     Days per week: Not on file     Minutes per session: Not on file    Stress: Not on file   Relationships    Social connections     Talks on phone: Not on file     Gets together: Not on file     Attends Gnosticism service: Not on file     Active member of club or organization: Not on file     Attends meetings of clubs or organizations: Not on file     Relationship status: Not on file    Intimate partner violence     Fear of current or ex partner: Not on file     Emotionally abused: Not on file     Physically abused: Not on file     Forced sexual activity: Not on file   Other Topics Concern    Not on file   Social History Narrative    Not on file     Allergies:  Patient has no known allergies. Review of Systems   Constitutional: Negative for chills, diaphoresis and fever. Cardiovascular: Negative for chest pain and leg swelling. Musculoskeletal: Positive for arthralgias and joint swelling. Skin: Positive for color change and wound. Neurological: Negative for dizziness, tingling, syncope, weakness, light-headedness and numbness. Objective: There were no vitals taken for this visit. Physical Exam  Constitutional:       General: He is not in acute distress.   HENT:      Mouth/Throat:      Mouth: Mucous membranes are moist.   Eyes:      Conjunctiva/sclera:

## 2020-05-12 RX ORDER — SULFAMETHOXAZOLE AND TRIMETHOPRIM 800; 160 MG/1; MG/1
1 TABLET ORAL 2 TIMES DAILY
Qty: 14 TABLET | Refills: 0 | Status: SHIPPED | OUTPATIENT
Start: 2020-05-12 | End: 2020-05-19

## 2020-06-18 ENCOUNTER — TELEMEDICINE (OUTPATIENT)
Dept: CARDIOLOGY CLINIC | Age: 70
End: 2020-06-18
Payer: MEDICARE

## 2020-06-18 PROCEDURE — G8417 CALC BMI ABV UP PARAM F/U: HCPCS | Performed by: INTERNAL MEDICINE

## 2020-06-18 PROCEDURE — G8427 DOCREV CUR MEDS BY ELIG CLIN: HCPCS | Performed by: INTERNAL MEDICINE

## 2020-06-18 PROCEDURE — 1036F TOBACCO NON-USER: CPT | Performed by: INTERNAL MEDICINE

## 2020-06-18 PROCEDURE — 1123F ACP DISCUSS/DSCN MKR DOCD: CPT | Performed by: INTERNAL MEDICINE

## 2020-06-18 PROCEDURE — 3017F COLORECTAL CA SCREEN DOC REV: CPT | Performed by: INTERNAL MEDICINE

## 2020-06-18 PROCEDURE — 4040F PNEUMOC VAC/ADMIN/RCVD: CPT | Performed by: INTERNAL MEDICINE

## 2020-06-18 PROCEDURE — 99214 OFFICE O/P EST MOD 30 MIN: CPT | Performed by: INTERNAL MEDICINE

## 2020-06-18 ASSESSMENT — ENCOUNTER SYMPTOMS
NAUSEA: 0
EYES NEGATIVE: 1
GASTROINTESTINAL NEGATIVE: 1
ABDOMINAL PAIN: 0
SHORTNESS OF BREATH: 0
WHEEZING: 0
VOMITING: 0

## 2020-06-18 NOTE — PROGRESS NOTES
6/18/2020    TELEHEALTH EVALUATION -- Audio/Visual (During IPMLF-52 public health emergency)    Due to Jackie 19 outbreak, patient's office visit was converted to a virtual visit. Patient was contacted and agreed to proceed with a virtual visit via [address] Visit  The risks and benefits of converting to a virtual visit were discussed in light of the current infectious disease epidemic. Patient also understood that insurance coverage and co-pays are up to their individual insurance plans. HPI:    -16-15: Patient presents for initial medical evaluation. Patient is followed on a regular basis by Dr. Omer Naranjo MD. S/p abnormal nuclear stress test with normal LVF, s/p LHC with 40-50% mid LAD stenosis with ? Myocardial bridgigng, normal LVF. Doing well overall. Pt denies chest pain, dyspnea, dyspnea on exertion, change in exercise capacity, fatigue,  nausea, vomiting, diarrhea, constipation, motor weakness, insomnia, weight loss, syncope, dizziness, lightheadedness, palpitations, PND, orthopnea, or claudication. Did have some eye issue that started all of the cardiac workup. Following with opthamologist. S/p carotid US with 1-15% stenosis b/l.      1-30-15: as above, s/p Echo with normal LVF, EF of 60%, mild MR, stage I DD. Left eye is feeling better. Pt denies chest pain, dyspnea, dyspnea on exertion, change in exercise capacity, fatigue,  nausea, vomiting, diarrhea, constipation, motor weakness, insomnia, weight loss, syncope, dizziness, lightheadedness, palpitations, PND, orthopnea, or claudication.     7-3-15: as above, Pt denies chest pain, dyspnea, dyspnea on exertion, change in exercise capacity, fatigue,  nausea, vomiting, diarrhea, constipation, motor weakness, insomnia, weight loss, syncope, dizziness, lightheadedness, palpitations, PND, orthopnea, or claudication. Compliant with meds. No hospitalizations. No LE edema. BP is under control. No muscle or body aches.  Lipid profile in Jan/2015 was no bleeding issues.      5-3-19: s/p LHC in  with 30-40% stenosis of mid LAD, EF of 50%. Was noted recently to have elevated K+, ACEI was stopped and lopressor was increased. Does have baseline CKD, stage III. Pt denies chest pain, dyspnea, dyspnea on exertion, change in exercise capacity, fatigue,  nausea, vomiting, diarrhea, constipation, motor weakness, insomnia, weight loss, syncope, dizziness, lightheadedness, palpitations, PND, orthopnea, or claudication. No nitro use. BP and hr are good. CAD is stable. No LE discoloration or ulcers. No LE edema. No CHF type symptoms. Lipid profile is normal. No recent hospitalization. Hx of Pafib s/p PPM. On DOAC, no bleeding issues.    19: EKG with NSR, RBBB. Pt denies chest pain, dyspnea, dyspnea on exertion, change in exercise capacity, fatigue,  nausea, vomiting, diarrhea, constipation, motor weakness, insomnia, weight loss, syncope, dizziness, lightheadedness, palpitations, PND, orthopnea, or claudication. No nitro use. BP and hr are good. CAD is stable. No LE discoloration or ulcers. No LE edema. No CHF type symptoms. Lipid profile is normal. No recent hospitalization. No change in meds. Following up with EP. Remain on DOAC for Pafib, no bleeding issues. Does have CKD, GFR of 56. LDL is 83. He is working out without any issues. 2020  [address] [place] [address] (:  1950) has requested an audio/video evaluation for the following concern(s):    Back on DOAC now after eye surgery. Pt denies chest pain, dyspnea, dyspnea on exertion, change in exercise capacity, fatigue,  nausea, vomiting, diarrhea, constipation, motor weakness, insomnia, weight loss, syncope, dizziness, lightheadedness, palpitations, PND, orthopnea, or claudication. Following with EP in one month. No recent labs. BP is good at home. Review of Systems   Constitutional: Negative. Negative for chills and fever. Eyes: Negative.     Respiratory: Negative for shortness of breath Procedure Laterality Date    ANGIOPLASTY      [facility]    [facility]    [facility]    ROTATOR CUFF REPAIR     ,   Family History   Problem Relation Age of Onset    Heart Disease Mother     Kidney Disease Father        PHYSICAL EXAMINATION:  [ INSTRUCTIONS:  \"[x]\" Indicates a positive item  \"[]\" Indicates a negative item  -- DELETE ALL ITEMS NOT EXAMINED]  [x] Alert  [x] Oriented to person/place/time    [x] No apparent distress  [] Toxic appearing    [] Face flushed appearing [x] Sclera clear  [] Lips are cyanotic      [x] Breathing appears normal  [] Appears tachypneic      [] Rash on visible skin    [] Cranial Nerves II-XII grossly intact    [] Motor grossly intact in visible upper extremities    [] Motor grossly intact in visible lower extremities    [x] Normal Mood  [] Anxious appearing    [] Depressed appearing  [] Confused appearing      [] Poor short term memory  [] Poor long term memory    [] OTHER:      Due to this being a TeleHealth encounter, evaluation of the following organ systems is limited: Vitals/Constitutional/EENT/Resp/CV/GI//MS/Neuro/Skin/Heme-Lymph-Imm. ASSESSMENT:        Diagnosis Orders   1. Essential hypertension  CBC    TSH without Reflex    Lipid Panel    Magnesium    Comprehensive Metabolic Panel   2. Coronary artery disease involving native coronary artery of native heart without angina pectoris     3. Paroxysmal atrial fibrillation (HCC)     4. Cardiomyopathy, unspecified type ([address]  ECHO Complete 2D W Doppler W Color    CBC    TSH without Reflex    Lipid Panel    Magnesium    Comprehensive Metabolic Panel   5. Hyperlipidemia, unspecified hyperlipidemia type     6. BBB (bundle branch block)     7.  Chronic kidney disease, unspecified CKD stage         PLAN:    Patient will need to continue to follow up with you for their general medical care and return to see me in the office in 6 months     As always, aggressive risk factor

## 2020-06-25 DIAGNOSIS — I42.9 CARDIOMYOPATHY, UNSPECIFIED TYPE (HCC): ICD-10-CM

## 2020-06-25 DIAGNOSIS — I10 ESSENTIAL HYPERTENSION: ICD-10-CM

## 2020-06-25 LAB
ALBUMIN SERPL-MCNC: 4.3 G/DL (ref 3.5–4.6)
ALP BLD-CCNC: 43 U/L (ref 35–104)
ALT SERPL-CCNC: 24 U/L (ref 0–41)
ANION GAP SERPL CALCULATED.3IONS-SCNC: 10 MEQ/L (ref 9–15)
AST SERPL-CCNC: 25 U/L (ref 0–40)
BILIRUB SERPL-MCNC: 0.4 MG/DL (ref 0.2–0.7)
BUN BLDV-MCNC: 26 MG/DL (ref 8–23)
CALCIUM SERPL-MCNC: 10.2 MG/DL (ref 8.5–9.9)
CHLORIDE BLD-SCNC: 106 MEQ/L (ref 95–107)
CHOLESTEROL, TOTAL: 120 MG/DL (ref 0–199)
CO2: 27 MEQ/L (ref 20–31)
CREAT SERPL-MCNC: 1.22 MG/DL (ref 0.7–1.2)
GFR AFRICAN AMERICAN: >60
GFR NON-AFRICAN AMERICAN: 58.5
GLOBULIN: 2.3 G/DL (ref 2.3–3.5)
GLUCOSE BLD-MCNC: 103 MG/DL (ref 70–99)
HCT VFR BLD CALC: 37.7 % (ref 42–52)
HDLC SERPL-MCNC: 34 MG/DL (ref 40–59)
HEMOGLOBIN: 12.9 G/DL (ref 14–18)
LDL CHOLESTEROL CALCULATED: 66 MG/DL (ref 0–129)
MAGNESIUM: 1.8 MG/DL (ref 1.7–2.4)
MCH RBC QN AUTO: 33.7 PG (ref 27–31.3)
MCHC RBC AUTO-ENTMCNC: 34.1 % (ref 33–37)
MCV RBC AUTO: 99.1 FL (ref 80–100)
PDW BLD-RTO: 13.3 % (ref 11.5–14.5)
PLATELET # BLD: 204 K/UL (ref 130–400)
POTASSIUM SERPL-SCNC: 4.3 MEQ/L (ref 3.4–4.9)
RBC # BLD: 3.81 M/UL (ref 4.7–6.1)
SODIUM BLD-SCNC: 143 MEQ/L (ref 135–144)
TOTAL PROTEIN: 6.6 G/DL (ref 6.3–8)
TRIGL SERPL-MCNC: 99 MG/DL (ref 0–150)
TSH SERPL DL<=0.05 MIU/L-ACNC: 1.62 UIU/ML (ref 0.44–3.86)
WBC # BLD: 5.2 K/UL (ref 4.8–10.8)

## 2020-07-07 ENCOUNTER — HOSPITAL ENCOUNTER (OUTPATIENT)
Dept: NON INVASIVE DIAGNOSTICS | Age: 70
Discharge: HOME | End: 2020-07-07
Payer: MEDICARE

## 2020-07-07 LAB
LV EF: 50 %
LVEF MODALITY: NORMAL

## 2020-07-07 PROCEDURE — 93306 TTE W/DOPPLER COMPLETE: CPT

## 2020-07-14 ENCOUNTER — TELEPHONE (OUTPATIENT)
Dept: FAMILY MEDICINE CLINIC | Age: 70
End: 2020-07-14

## 2020-07-14 RX ORDER — POLYETHYLENE GLYCOL 3350, SODIUM CHLORIDE, SODIUM BICARBONATE, POTASSIUM CHLORIDE 420; 11.2; 5.72; 1.48 G/4L; G/4L; G/4L; G/4L
4000 POWDER, FOR SOLUTION ORAL ONCE
Qty: 4000 ML | Refills: 0 | Status: SHIPPED | OUTPATIENT
Start: 2020-07-14 | End: 2020-07-14

## 2020-07-14 NOTE — TELEPHONE ENCOUNTER
Patient last seen 05- VV with Kennedy Zimmer and 11- with     Patient is due for colonoscopy and has positive responses on the Patient Screening Questionnaire. Questionnaire is scanned in. Referral for Colonoscopy and Rx for the prep is pending if okay to schedule. Patient will also be screened by the GI nurse who will follow the same process that is in place if a GI consult is needed. Please advise if it is okay for referral or do you need to see the patient prior?

## 2020-07-27 ENCOUNTER — OFFICE VISIT (OUTPATIENT)
Dept: FAMILY MEDICINE CLINIC | Age: 70
End: 2020-07-27
Payer: MEDICARE

## 2020-07-27 VITALS
HEART RATE: 83 BPM | HEIGHT: 72 IN | BODY MASS INDEX: 30.23 KG/M2 | WEIGHT: 223.2 LBS | DIASTOLIC BLOOD PRESSURE: 62 MMHG | TEMPERATURE: 97.7 F | SYSTOLIC BLOOD PRESSURE: 124 MMHG | OXYGEN SATURATION: 96 %

## 2020-07-27 DIAGNOSIS — M10.9 ACUTE GOUT INVOLVING TOE OF RIGHT FOOT, UNSPECIFIED CAUSE: ICD-10-CM

## 2020-07-27 LAB — URIC ACID, SERUM: 5.2 MG/DL (ref 3.4–7)

## 2020-07-27 PROCEDURE — 3017F COLORECTAL CA SCREEN DOC REV: CPT | Performed by: NURSE PRACTITIONER

## 2020-07-27 PROCEDURE — G8417 CALC BMI ABV UP PARAM F/U: HCPCS | Performed by: NURSE PRACTITIONER

## 2020-07-27 PROCEDURE — 4040F PNEUMOC VAC/ADMIN/RCVD: CPT | Performed by: NURSE PRACTITIONER

## 2020-07-27 PROCEDURE — G8427 DOCREV CUR MEDS BY ELIG CLIN: HCPCS | Performed by: NURSE PRACTITIONER

## 2020-07-27 PROCEDURE — 1123F ACP DISCUSS/DSCN MKR DOCD: CPT | Performed by: NURSE PRACTITIONER

## 2020-07-27 PROCEDURE — 99213 OFFICE O/P EST LOW 20 MIN: CPT | Performed by: NURSE PRACTITIONER

## 2020-07-27 PROCEDURE — 1036F TOBACCO NON-USER: CPT | Performed by: NURSE PRACTITIONER

## 2020-07-27 RX ORDER — METHYLPREDNISOLONE 4 MG/1
TABLET ORAL
Qty: 1 KIT | Refills: 0 | Status: SHIPPED | OUTPATIENT
Start: 2020-07-27 | End: 2020-11-16

## 2020-07-27 NOTE — PATIENT INSTRUCTIONS
Patient Education        Gout: Care Instructions  Your Care Instructions     Gout is a form of arthritis caused by a buildup of uric acid crystals in a joint. It causes sudden attacks of pain, swelling, redness, and stiffness, usually in one joint, especially the big toe. Gout usually comes on without a cause. But it can be brought on by drinking alcohol (especially beer) or eating seafood and red meat. Taking certain medicines, such as diuretics or aspirin, also can bring on an attack of gout. Taking your medicines as prescribed and following up with your doctor regularly can help you avoid gout attacks in the future. Follow-up care is a key part of your treatment and safety. Be sure to make and go to all appointments, and call your doctor if you are having problems. It's also a good idea to know your test results and keep a list of the medicines you take. How can you care for yourself at home? · If the joint is swollen, put ice or a cold pack on the area for 10 to 20 minutes at a time. Put a thin cloth between the ice and your skin. · Prop up the sore limb on a pillow when you ice it or anytime you sit or lie down during the next 3 days. Try to keep it above the level of your heart. This will help reduce swelling. · Rest sore joints. Avoid activities that put weight or strain on the joints for a few days. Take short rest breaks from your regular activities during the day. · Take your medicines exactly as prescribed. Call your doctor if you think you are having a problem with your medicine. · Take pain medicines exactly as directed. ? If the doctor gave you a prescription medicine for pain, take it as prescribed. ? If you are not taking a prescription pain medicine, ask your doctor if you can take an over-the-counter medicine. · Eat less seafood and red meat. · Check with your doctor before drinking alcohol. · Losing weight, if you are overweight, may help reduce attacks of gout.  But do not go on a \"crash diet. \" Losing a lot of weight in a short amount of time can cause a gout attack. When should you call for help? Call your doctor now or seek immediate medical care if:  · You have a fever. · The joint is so painful you cannot use it. · You have sudden, unexplained swelling, redness, warmth, or severe pain in one or more joints. Watch closely for changes in your health, and be sure to contact your doctor if:  · You have joint pain. · Your symptoms get worse or are not improving after 2 or 3 days. Where can you learn more? Go to https://3LMpeSocial Insighteweb.K[employer] Internet. org and sign in to your [employer] account. Enter T357 in the PAK box to learn more about \"Gout: Care Instructions. \"     If you do not have an account, please click on the \"Sign Up Now\" link. Current as of: December 9, 2019               Content Version: 12.5  © 3711-7396 [employer]. Care instructions adapted under license by [facility] ([facility]). If you have questions about a medical condition or this instruction, always ask your healthcare professional. [place] [address] any warranty or liability for your use of this information. Patient Education        Purine-Restricted Diet: Care Instructions  Your Care Instructions     Purines are substances that are found in some foods. Your body turns purines into uric acid. High levels of uric acid can cause gout, which is a form of arthritis that causes pain and inflammation in joints. You may be able to help control the amount of uric acid in your body by limiting high-purine foods in your diet. Follow-up care is a key part of your treatment and safety. Be sure to make and go to all appointments, and call your doctor if you are having problems. It's also a good idea to know your test results and keep a list of the medicines you take. How can you care for yourself at home?   · Plan your meals and snacks around foods that are low in purines and are safe for you to eat. These foods include:  ? Green vegetables and tomatoes. ? Fruits. ? Whole-grain breads, rice, and cereals. ? Eggs, peanut butter, and nuts. ? Low-fat milk, cheese, and other milk products. ? Popcorn. ? Gelatin desserts, chocolate, cocoa, and cakes and sweets, in small amounts. · You can eat certain foods that are medium-high in purines, but eat them only once in a while. These foods include:  ? Legumes, such as dried beans and dried peas. You can have 1 cup cooked legumes each day. ? Asparagus, cauliflower, spinach, mushrooms, and green peas. ? Fish and seafood (other than very high-purine seafood). ? Oatmeal, wheat bran, and wheat germ. · Limit very high-purine foods, including:  ? Organ meats, such as liver, kidneys, sweetbreads, and brains. ? Meats, including ordonez, beef, pork, and lamb. ? Game meats and any other meats in large amounts. ? Anchovies, sardines, herring, mackerel, and scallops. ? Gravy. ? Beer. Where can you learn more? Go to https://Dextrpe4INFO.Mulu. org and sign in to your [employer] account. Enter F448 in the [facility] box to learn more about \"Purine-Restricted Diet: Care Instructions. \"     If you do not have an account, please click on the \"Sign Up Now\" link. Current as of: August 22, 2019               Content Version: 12.5  © 3184-9529 [employer]. Care instructions adapted under license by [facility] ([facility]). If you have questions about a medical condition or this instruction, always ask your healthcare professional. [place] [address] any warranty or liability for your use of this information.

## 2020-07-27 NOTE — PROGRESS NOTES
Subjective  Sandra Correa, 69 y.o. male presents today with:  Chief Complaint   Patient presents with    Foot Problem     Patient presents today c/o Rt big toe swelling. Patient has history of gout. HPI   Presents to [facility] for a gout flare  Right big toe with inflammation and erythema  Tender to touch   Has been intermittently taking ibuprofen   States area is painful if his shoe touches the area   Denies fever or chills  States his diet recently may have caused his gout to flare up         Past Medical History:   Diagnosis Date    BBB (bundle branch block)     RIGHT    CAD (coronary artery disease) 1/16/2015    Cardiomyopathy, nonischemic ([facility] 8/23/2018    Chronic kidney disease     GERD (gastroesophageal reflux disease)     Hyperlipidemia     Hypertension     Paroxysmal atrial fibrillation ([facility] 5/18/2018    Pelvic fracture ([facility]     REMOTE    Retinal artery occlusion 1/16/2015      Past Surgical History:   Procedure Laterality Date    ANGIOPLASTY      [facility]    [facility]    ROTATOR CUFF REPAIR       Family History   Problem Relation Age of Onset    Heart Disease Mother     Kidney Disease Father        Review of Systems   Constitutional: Negative for activity change, appetite change, chills, diaphoresis, fatigue and fever. Musculoskeletal: Positive for arthralgias. Neurological: Negative for dizziness, light-headedness and headaches. PMH, Surgical Hx, Family Hx, and Social Hx reviewed and updated.       Objective  Vitals:    07/27/20 1315   BP: 124/62   Site: Left Upper Arm   Position: Sitting   Cuff Size: Large Adult   Pulse: 83   Temp: 97.7 °F (36.5 °C)   TempSrc: Temporal   SpO2: 96%   Weight: 223 lb 3.2 oz (101.2 kg)   Height: 6' (1.829 m)     BP Readings from Last 3 Encounters:   07/27/20 124/62   05/01/20 120/70   12/27/19 120/80     Wt Readings from Last 3 Encounters:   07/27/20 223 lb 3.2 oz (101.2 kg) 05/01/20 226 lb (102.5 kg)   12/27/19 228 lb 9.6 oz (103.7 kg)     Physical Exam  Vitals signs reviewed. Constitutional:       Appearance: Normal appearance. He is well-developed. HENT:      Head: Normocephalic. Eyes:      General: Lids are normal.      Conjunctiva/sclera: Conjunctivae normal.   Cardiovascular:      Rate and Rhythm: Normal rate. Pulses:           Dorsalis pedis pulses are 2+ on the right side. Posterior tibial pulses are 2+ on the right side. Pulmonary:      Effort: Pulmonary effort is normal.   Musculoskeletal:        Feet:    Feet:      Right foot:      Skin integrity: Erythema present. No warmth. Toenail Condition: Right toenails are normal.   Skin:     General: Skin is warm and dry. Capillary Refill: Capillary refill takes less than 2 seconds. Coloration: Skin is not pale. Neurological:      General: No focal deficit present. Mental Status: He is alert and oriented to person, place, and time. Psychiatric:         Mood and Affect: Mood normal.         Behavior: Behavior normal.       Assessment & Plan    Diagnosis Orders   1. Acute gout involving toe of right foot, unspecified cause  Uric Acid    methylPREDNISolone (MEDROL, KYRA,) 4 MG tablet     Orders Placed This Encounter   Procedures    Uric Acid     Standing Status:   Future     Number of Occurrences:   1     Standing Expiration Date:   7/27/2021     Orders Placed This Encounter   Medications    methylPREDNISolone (MEDROL, KYRA,) 4 MG tablet     Sig: Take by mouth. Dispense:  1 kit     Refill:  0     Return if symptoms worsen or fail to improve, for follow up with PCP. Reviewed with the patient: current clinical status & medications. Side effects, adverse effects of the medication prescribed today, as well as treatment plan/rationale and result expectations have been discussed with the patient who expresses understanding. How can you care for yourself at home?   · If the joint is swollen, put ice or a cold pack on the area for 10 to 20 minutes at a time. Put a thin cloth between the ice and your skin. · Prop up the sore limb on a pillow when you ice it or anytime you sit or lie down during the next 3 days. Try to keep it above the level of your heart. This will help reduce swelling. · Rest sore joints. Avoid activities that put weight or strain on the joints for a few days. Take short rest breaks from your regular activities during the day. · Take your medicines exactly as prescribed. Call your doctor if you think you are having a problem with your medicine. · Take pain medicines exactly as directed. ? If the doctor gave you a prescription medicine for pain, take it as prescribed. ? If you are not taking a prescription pain medicine, ask your doctor if you can take an over-the-counter medicine. · Eat less seafood and red meat. · Check with your doctor before drinking alcohol. When should you call for help? Call your doctor now or seek immediate medical care if:  · You have a fever. · The joint is so painful you cannot use it. · You have sudden, unexplained swelling, redness, warmth, or severe pain in one or more joints. Watch closely for changes in your health, and be sure to contact your doctor if:  · You have joint pain. · Your symptoms get worse or are not improving after 2 or 3 days. Close follow up to evaluate treatment results and for coordination of care. I have reviewed the patient's medical history in detail and updated the computerized patient record.       KRISTEN Adame NP

## 2020-09-11 ENCOUNTER — TELEPHONE (OUTPATIENT)
Dept: CARDIOLOGY CLINIC | Age: 70
End: 2020-09-11

## 2020-09-11 NOTE — TELEPHONE ENCOUNTER
Received fax from [facility]. Patient is scheduled for a colonoscopy and need to know if ok to hold Xarelto and how many days.  Please advise

## 2020-09-21 ENCOUNTER — TELEPHONE (OUTPATIENT)
Dept: ENDOSCOPY | Age: 70
End: 2020-09-21

## 2020-09-21 NOTE — TELEPHONE ENCOUNTER
called in prescription for Trilyte bowel prep kit to [facility] pharmacy on [address] in [place] 9/21/2020 at 1:07pm

## 2020-09-28 ENCOUNTER — NURSE ONLY (OUTPATIENT)
Dept: PRIMARY CARE CLINIC | Age: 70
End: 2020-09-28

## 2020-09-28 ENCOUNTER — HOSPITAL ENCOUNTER (OUTPATIENT)
Age: 70
Setting detail: SPECIMEN
Discharge: HOME | End: 2020-09-28
Payer: MEDICARE

## 2020-09-28 PROCEDURE — U0003 INFECTIOUS AGENT DETECTION BY NUCLEIC ACID (DNA OR RNA); SEVERE ACUTE RESPIRATORY SYNDROME CORONAVIRUS 2 (SARS-COV-2) (CORONAVIRUS DISEASE [COVID-19]), AMPLIFIED PROBE TECHNIQUE, MAKING USE OF HIGH THROUGHPUT TECHNOLOGIES AS DESCRIBED BY CMS-2020-01-R: HCPCS

## 2020-09-30 LAB
SARS-COV-2: NOT DETECTED
SOURCE: NORMAL

## 2020-10-05 ENCOUNTER — HOSPITAL ENCOUNTER (OUTPATIENT)
Age: 70
Setting detail: OUTPATIENT SURGERY
Discharge: HOME | End: 2020-10-05
Attending: SPECIALIST | Admitting: SPECIALIST
Payer: MEDICARE

## 2020-10-05 ENCOUNTER — ANCILLARY PROCEDURE (OUTPATIENT)
Dept: ENDOSCOPY | Age: 70
End: 2020-10-05
Attending: SPECIALIST
Payer: MEDICARE

## 2020-10-05 ENCOUNTER — ANESTHESIA EVENT (OUTPATIENT)
Dept: ENDOSCOPY | Age: 70
End: 2020-10-05
Payer: MEDICARE

## 2020-10-05 ENCOUNTER — ANESTHESIA (OUTPATIENT)
Dept: ENDOSCOPY | Age: 70
End: 2020-10-05
Payer: MEDICARE

## 2020-10-05 VITALS
BODY MASS INDEX: 29.8 KG/M2 | TEMPERATURE: 97.4 F | HEART RATE: 72 BPM | HEIGHT: 72 IN | WEIGHT: 220 LBS | DIASTOLIC BLOOD PRESSURE: 65 MMHG | OXYGEN SATURATION: 93 % | RESPIRATION RATE: 20 BRPM | SYSTOLIC BLOOD PRESSURE: 103 MMHG

## 2020-10-05 VITALS
RESPIRATION RATE: 21 BRPM | OXYGEN SATURATION: 95 % | SYSTOLIC BLOOD PRESSURE: 94 MMHG | DIASTOLIC BLOOD PRESSURE: 63 MMHG

## 2020-10-05 PROCEDURE — 88305 TISSUE EXAM BY PATHOLOGIST: CPT

## 2020-10-05 PROCEDURE — 7100000011 HC PHASE II RECOVERY - ADDTL 15 MIN: Performed by: SPECIALIST

## 2020-10-05 PROCEDURE — 45380 COLONOSCOPY AND BIOPSY: CPT | Performed by: SPECIALIST

## 2020-10-05 PROCEDURE — 6360000002 HC RX W HCPCS: Performed by: NURSE ANESTHETIST, CERTIFIED REGISTERED

## 2020-10-05 PROCEDURE — 3609027000 HC COLONOSCOPY: Performed by: SPECIALIST

## 2020-10-05 PROCEDURE — 2580000003 HC RX 258: Performed by: SPECIALIST

## 2020-10-05 PROCEDURE — 6370000000 HC RX 637 (ALT 250 FOR IP): Performed by: SPECIALIST

## 2020-10-05 PROCEDURE — 2500000003 HC RX 250 WO HCPCS: Performed by: NURSE ANESTHETIST, CERTIFIED REGISTERED

## 2020-10-05 PROCEDURE — 2709999900 HC NON-CHARGEABLE SUPPLY: Performed by: SPECIALIST

## 2020-10-05 PROCEDURE — 7100000010 HC PHASE II RECOVERY - FIRST 15 MIN: Performed by: SPECIALIST

## 2020-10-05 PROCEDURE — 3700000000 HC ANESTHESIA ATTENDED CARE: Performed by: SPECIALIST

## 2020-10-05 PROCEDURE — 2580000003 HC RX 258

## 2020-10-05 PROCEDURE — 3700000001 HC ADD 15 MINUTES (ANESTHESIA): Performed by: SPECIALIST

## 2020-10-05 RX ORDER — PROPOFOL 10 MG/ML
INJECTION, EMULSION INTRAVENOUS PRN
Status: DISCONTINUED | OUTPATIENT
Start: 2020-10-05 | End: 2020-10-05 | Stop reason: SDUPTHER

## 2020-10-05 RX ORDER — SIMETHICONE 20 MG/.3ML
EMULSION ORAL PRN
Status: DISCONTINUED | OUTPATIENT
Start: 2020-10-05 | End: 2020-10-05 | Stop reason: ALTCHOICE

## 2020-10-05 RX ORDER — SODIUM CHLORIDE 9 MG/ML
INJECTION, SOLUTION INTRAVENOUS CONTINUOUS
Status: DISCONTINUED | OUTPATIENT
Start: 2020-10-05 | End: 2020-10-05 | Stop reason: HOSPADM

## 2020-10-05 RX ORDER — LIDOCAINE HYDROCHLORIDE 20 MG/ML
INJECTION, SOLUTION INFILTRATION; PERINEURAL PRN
Status: DISCONTINUED | OUTPATIENT
Start: 2020-10-05 | End: 2020-10-05 | Stop reason: SDUPTHER

## 2020-10-05 RX ORDER — OMEPRAZOLE 40 MG/1
CAPSULE, DELAYED RELEASE ORAL
Qty: 90 CAPSULE | Refills: 2 | Status: SHIPPED | OUTPATIENT
Start: 2020-10-05 | End: 2021-06-17

## 2020-10-05 RX ORDER — SODIUM CHLORIDE 9 MG/ML
INJECTION, SOLUTION INTRAVENOUS
Status: COMPLETED
Start: 2020-10-05 | End: 2020-10-05

## 2020-10-05 RX ORDER — MAGNESIUM HYDROXIDE 1200 MG/15ML
LIQUID ORAL PRN
Status: DISCONTINUED | OUTPATIENT
Start: 2020-10-05 | End: 2020-10-05 | Stop reason: ALTCHOICE

## 2020-10-05 RX ADMIN — LIDOCAINE HYDROCHLORIDE 60 MG: 20 INJECTION, SOLUTION INFILTRATION; PERINEURAL at 10:42

## 2020-10-05 RX ADMIN — PROPOFOL 300 MG: 10 INJECTION, EMULSION INTRAVENOUS at 10:42

## 2020-10-05 RX ADMIN — SODIUM CHLORIDE: 9 INJECTION, SOLUTION INTRAVENOUS at 10:16

## 2020-10-05 NOTE — TELEPHONE ENCOUNTER
Requesting medication refill.  Please approve or deny this request.    Rx requested:  Requested Prescriptions     Pending Prescriptions Disp Refills    omeprazole (PRILOSEC) 40 MG delayed release capsule [Pharmacy Med Name: OMEPRAZOL RX CAP 40MG] 90 capsule 2     Sig: TAKE 1 CAPSULE DAILY       Last Office Visit:   11/4/2019    Last Filled:      Last Labs:      Next Visit Date:  Future Appointments   Date Time Provider Sara Shelton   12/11/2020  9:15 AM Polo Dhillon, [address]

## 2020-10-05 NOTE — H&P
Patient Name: Thiago Aguillon  : 1950  MRN: [id number]  DATE: 10/05/20      ENDOSCOPY  History and Physical    Procedure:    [] Diagnostic Colonoscopy       [x] Screening Colonoscopy  [] EGD      [] ERCP      [] EUS       [] Other    [x] Previous office notes/History and Physical reviewed from the patients chart. Please see EMR for further details of HPI. I have examined the patient's status immediately prior to the procedure and:      Indications/HPI:    []Abdominal Pain  []Cancer- GI/Lung  []Fhx of colon CA/polyps  []History of Polyps  []Gonzalezs   []Melena  []Abnormal Imaging  []Dysphagia    []Persistent Pneumonia  []Anemia  []Food Impaction  []History of Polyps  []GI Bleed  []Pulmonary nodule/Mass  []Change in bowel habits []Heartburn/Reflux  []Rectal Bleed (BRBPR)  []Chest Pain - Non Cardiac []Heme (+) Stoo  l[]Ulcers  []Constipation  []Hemoptysis   []Varices  []Diarrhea  []Hypoxemia  []Nausea/Vomiting  []Screening   []Crohns/Colitis  []Other:   Anesthesia:   [x] MAC [] Moderate Sedation   [] General   [] None     ROS: 12 pt Review of Symptoms was negative unless mentioned above    Medications:   Prior to Admission medications    Medication Sig Start Date End Date Taking?  Authorizing Provider   pravastatin (PRAVACHOL) 20 MG tablet TAKE ONE TABLET BY MOUTH EVERY DAY 20  Yes Polo Ruiz, DO   metoprolol succinate (TOPROL XL) 50 MG extended release tablet TAKE ONE TABLET BY MOUTH DAILY 20  Yes Polo Ruiz, DO   allopurinol (ZYLOPRIM) 300 MG tablet TAKE ONE TABLET BY MOUTH EVERY DAY 18  Yes Gerardo Andrews MD   levETIRAcetam (KEPPRA) 250 MG tablet TAKE ONE TABLET BY MOUTH TWO TIMES A DAY 18  Yes Gerardo Andrews MD   omeprazole (PRILOSEC) 40 MG delayed release capsule TAKE 1 CAPSULE DAILY 10/5/20   Gerardo Andrews MD   methylPREDNISolone (MEDROL, KYRA,) 4 MG tablet Take by mouth. 20   KRISTEN Ibarra - NP   naproxen (NAPROSYN) 500 MG tablet Take 500 mg by mouth 2 qd Historical Provider, MD   XARELTO 20 MG TABS tablet TAKE ONE TABLET BY MOUTH EVERY DAY 17   Historical Provider, MD   colchicine (COLCRYS) 0.6 MG tablet Take 1 tablet by mouth daily as needed for Pain 17   Naida Bernheim, MD       Allergies: No Known Allergies     History of allergic reaction to anesthesia:  No    Past Medical History:  Past Medical History:   Diagnosis Date    BBB (bundle branch block)     RIGHT    CAD (coronary artery disease) 2015    Cardiomyopathy[facility]ischemic [facility] 2018    Chronic kidney disease     GERD (gastroesophageal reflux disease)     Hyperlipidemia     Hypertension     Paroxysmal atrial fibrillation ([facility] 2018    Pelvic fracture (HCC)     REMOTE    Retinal artery occlusion 2015       Past Surgical History:  Past Surgical History:   Procedure Laterality Date    ANGIOPLASTY      ANKLE FRACTURE SURGERY Right     [facility]    [facility]    FRACTURE SURGERY      PELVIC FRACTURE SURGERY      ROTATOR CUFF REPAIR         Social History:  Social History     Tobacco Use    Smoking status: Former Smoker     Packs/day: 1.00     Years: 40.00     Pack years: 40.00     Start date:      Last attempt to quit: 2007     Years since quittin.0    Smokeless tobacco: Never Used   Substance Use Topics    Alcohol use: Yes    Drug use: No       Vital Signs:   Vitals:    10/05/20 1001   BP: 132/73   Pulse: 78   Resp: 16   Temp: 97.4 °F (36.3 °C)   SpO2: 95%        Physical Exam:  Cardiac:  [x]WNL  []Comments:  Pulmonary:  [x]WNL   []Comments:   Neuro/Mental Status:  [x]WNL  []Comments:  Abdominal:  [x]WNL    []Comments:  Other:   []WNL  []Comments:    Informed Consent:  The risks and benefits of the procedure have been discussed with either the patient or if they cannot consent, their representative. Assessment:  Patient examined and appropriate for planned sedation and procedure.

## 2020-10-05 NOTE — ANESTHESIA POSTPROCEDURE EVALUATION
Department of Anesthesiology  Postprocedure Note    Patient: Eladio Dempsey  MRN: [id number]  YOB: 1950  Date of evaluation: 10/5/2020  Time:  10:58 AM     Procedure Summary     Date:  10/05/20 Room / Location:  [facility] OR 01 / [facility]    Anesthesia Start:  1039 Anesthesia Stop:      Procedure:  COLONOSCOPY WITH POLYPECTOMY (N/A ) Diagnosis:  (Colon cancer screening Z12.11)    Surgeon:  Carlo Almaguer MD Responsible Provider:  KIRSTEN Fields CRNA    Anesthesia Type:  MAC ASA Status:  3          Anesthesia Type: MAC    Marty Phase I:      Marty Phase II:      Last vitals: Reviewed and per EMR flowsheets.        Anesthesia Post Evaluation    Patient location during evaluation: bedside  Patient participation: complete - patient participated  Level of consciousness: awake and awake and alert  Pain score: 0  Airway patency: patent  Nausea & Vomiting: no nausea and no vomiting  Complications: no  Cardiovascular status: blood pressure returned to baseline and hemodynamically stable  Respiratory status: acceptable and spontaneous ventilation  Hydration status: euvolemic

## 2020-10-05 NOTE — ANESTHESIA PRE PROCEDURE
Department of Anesthesiology  Preprocedure Note       Name:  Mike Asif   Age:  69 y.o.  :  1950                                          MRN:  [id number]         Date:  10/5/2020      Surgeon: Diane Wagner):  Marycruz Lennon MD    Procedure: Procedure(s):  COLORECTAL CANCER SCREENING, NOT HIGH RISK    Medications prior to admission:   Prior to Admission medications    Medication Sig Start Date End Date Taking? Authorizing Provider   methylPREDNISolone (MEDROL, KYRA,) 4 MG tablet Take by mouth. 20   KRISTEN Canales NP   omeprazole (PRILOSEC) 40 MG delayed release capsule TAKE ONE CAPSULE BY MOUTH EVERY DAY 20   Safia Cunningham MD   pravastatin (PRAVACHOL) 20 MG tablet TAKE ONE TABLET BY MOUTH EVERY DAY 20   [facility] Holiday, DO   metoprolol succinate (TOPROL XL) 50 MG extended release tablet TAKE ONE TABLET BY MOUTH DAILY 20   [facility] Holiday, DO   naproxen (NAPROSYN) 500 MG tablet Take 500 mg by mouth 2 qd    Historical Provider, MD   allopurinol (ZYLOPRIM) 300 MG tablet TAKE ONE TABLET BY MOUTH EVERY DAY 18   Safia Cunningham MD   levETIRAcetam (KEPPRA) 250 MG tablet TAKE ONE TABLET BY MOUTH TWO TIMES A DAY 18   Safia Cunningham MD   XARELTO 20 MG TABS tablet TAKE ONE TABLET BY MOUTH EVERY DAY 17   Historical Provider, MD   colchicine (COLCRYS) 0.6 MG tablet Take 1 tablet by mouth daily as needed for Pain 17   Safia Cunningham MD       Current medications:    No current facility-administered medications for this encounter. Current Outpatient Medications   Medication Sig Dispense Refill    methylPREDNISolone (MEDROL, KYRA,) 4 MG tablet Take by mouth.  1 kit 0    omeprazole (PRILOSEC) 40 MG delayed release capsule TAKE ONE CAPSULE BY MOUTH EVERY DAY 90 capsule 2    pravastatin (PRAVACHOL) 20 MG tablet TAKE ONE TABLET BY MOUTH EVERY DAY 90 tablet 3    metoprolol succinate (TOPROL XL) 50 MG extended release tablet TAKE ONE TABLET BY MOUTH DAILY 90 tablet 3  naproxen (NAPROSYN) 500 MG tablet Take 500 mg by mouth 2 qd      allopurinol (ZYLOPRIM) 300 MG tablet TAKE ONE TABLET BY MOUTH EVERY DAY 90 tablet 1    levETIRAcetam (KEPPRA) 250 MG tablet TAKE ONE TABLET BY MOUTH TWO TIMES A  tablet 0    XARELTO 20 MG TABS tablet TAKE ONE TABLET BY MOUTH EVERY DAY  3    colchicine (COLCRYS) 0.6 MG tablet Take 1 tablet by mouth daily as needed for Pain 30 tablet 3       Allergies:  No Known Allergies    Problem List:    Patient Active Problem List   Diagnosis Code    BBB (bundle branch block) I45.4    Hypertension I10    Chronic kidney disease N18.9    Hyperlipidemia E78.5    GERD (gastroesophageal reflux disease) K21.9    Adjustment disorder with mixed anxiety and depressed mood F43.23    Chronic low back pain M54.5, G89.29    CAD (coronary artery disease) I25.10    Retinal artery occlusion H34.9    Pacemaker Z95.0    Paroxysmal atrial fibrillation (HCC) I48.0    Cardiomyopathy ([address] I42.9       Past Medical History:        Diagnosis Date    BBB (bundle branch block)     RIGHT    CAD (coronary artery disease) 2015    Cardiomyopathy, nonischemic (HCC) 2018    Chronic kidney disease     GERD (gastroesophageal reflux disease)     Hyperlipidemia     Hypertension     Paroxysmal atrial fibrillation ([address] 2018    Pelvic fracture (HCC)     REMOTE    Retinal artery occlusion 2015       Past Surgical History:        Procedure Laterality Date    ANGIOPLASTY      [facility]    [facility] Right [facility]    ROTATOR CUFF REPAIR         Social History:    Social History     Tobacco Use    Smoking status: Former Smoker     Packs/day: 1.00     Years: 40.00     Pack years: 40.00     Start date:      Last attempt to quit: 2007     Years since quittin.0    Smokeless tobacco: Never Used   Substance Use Topics    Alcohol use:  Yes                                Counseling given: Not Dental:          Pulmonary:Negative Pulmonary ROS and normal exam                               Cardiovascular:    (+) hypertension:, pacemaker: pacemaker, CAD:, hyperlipidemia        Rhythm: regular  Rate: normal                    Neuro/Psych:   Negative Neuro/Psych ROS              GI/Hepatic/Renal:   (+) GERD:, renal disease: CRI, bowel prep,           Endo/Other: Negative Endo/Other ROS                    Abdominal:           Vascular: negative vascular ROS. Anesthesia Plan      MAC     ASA 3       Induction: intravenous. Anesthetic plan and risks discussed with patient. Plan discussed with attending.                   KRISTEN Maloney - CRNA   10/5/2020

## 2020-11-02 RX ORDER — ALLOPURINOL 300 MG/1
TABLET ORAL
Qty: 90 TABLET | Refills: 3 | Status: SHIPPED | OUTPATIENT
Start: 2020-11-02 | End: 2021-11-01

## 2020-11-16 ENCOUNTER — VIRTUAL VISIT (OUTPATIENT)
Dept: FAMILY MEDICINE CLINIC | Age: 70
End: 2020-11-16
Payer: MEDICARE

## 2020-11-16 PROCEDURE — 1123F ACP DISCUSS/DSCN MKR DOCD: CPT | Performed by: FAMILY MEDICINE

## 2020-11-16 PROCEDURE — 3017F COLORECTAL CA SCREEN DOC REV: CPT | Performed by: FAMILY MEDICINE

## 2020-11-16 PROCEDURE — G8482 FLU IMMUNIZE ORDER/ADMIN: HCPCS | Performed by: FAMILY MEDICINE

## 2020-11-16 PROCEDURE — G0439 PPPS, SUBSEQ VISIT: HCPCS | Performed by: FAMILY MEDICINE

## 2020-11-16 PROCEDURE — 4040F PNEUMOC VAC/ADMIN/RCVD: CPT | Performed by: FAMILY MEDICINE

## 2020-11-16 SDOH — ECONOMIC STABILITY: FOOD INSECURITY: WITHIN THE PAST 12 MONTHS, THE FOOD YOU BOUGHT JUST DIDN'T LAST AND YOU DIDN'T HAVE MONEY TO GET MORE.: NEVER TRUE

## 2020-11-16 SDOH — ECONOMIC STABILITY: INCOME INSECURITY: HOW HARD IS IT FOR YOU TO PAY FOR THE VERY BASICS LIKE FOOD, HOUSING, MEDICAL CARE, AND HEATING?: NOT HARD AT ALL

## 2020-11-16 SDOH — ECONOMIC STABILITY: FOOD INSECURITY: WITHIN THE PAST 12 MONTHS, YOU WORRIED THAT YOUR FOOD WOULD RUN OUT BEFORE YOU GOT MONEY TO BUY MORE.: NEVER TRUE

## 2020-11-16 SDOH — ECONOMIC STABILITY: TRANSPORTATION INSECURITY
IN THE PAST 12 MONTHS, HAS LACK OF TRANSPORTATION KEPT YOU FROM MEETINGS, WORK, OR FROM GETTING THINGS NEEDED FOR DAILY LIVING?: NO

## 2020-11-16 SDOH — ECONOMIC STABILITY: TRANSPORTATION INSECURITY
IN THE PAST 12 MONTHS, HAS THE LACK OF TRANSPORTATION KEPT YOU FROM MEDICAL APPOINTMENTS OR FROM GETTING MEDICATIONS?: NO

## 2020-11-16 ASSESSMENT — LIFESTYLE VARIABLES
AUDIT-C TOTAL SCORE: 0
HAVE YOU OR SOMEONE ELSE BEEN INJURED AS A RESULT OF YOUR DRINKING: 0
HOW OFTEN DURING THE LAST YEAR HAVE YOU NEEDED AN ALCOHOLIC DRINK FIRST THING IN THE MORNING TO GET YOURSELF GOING AFTER A NIGHT OF HEAVY DRINKING: NEVER
HOW OFTEN DURING THE LAST YEAR HAVE YOU BEEN UNABLE TO REMEMBER WHAT HAPPENED THE NIGHT BEFORE BECAUSE YOU HAD BEEN DRINKING: NEVER
HOW OFTEN DURING THE LAST YEAR HAVE YOU HAD A FEELING OF GUILT OR REMORSE AFTER DRINKING: NEVER
HOW OFTEN DURING THE LAST YEAR HAVE YOU FOUND THAT YOU WERE NOT ABLE TO STOP DRINKING ONCE YOU HAD STARTED: NEVER
HAS A RELATIVE, FRIEND, DOCTOR, OR ANOTHER HEALTH PROFESSIONAL EXPRESSED CONCERN ABOUT YOUR DRINKING OR SUGGESTED YOU CUT DOWN: NO
HOW MANY STANDARD DRINKS CONTAINING ALCOHOL DO YOU HAVE ON A TYPICAL DAY: 0
AUDIT TOTAL SCORE: 2
HAS A RELATIVE, FRIEND, DOCTOR, OR ANOTHER HEALTH PROFESSIONAL EXPRESSED CONCERN ABOUT YOUR DRINKING OR SUGGESTED YOU CUT DOWN: 0
HOW OFTEN DURING THE LAST YEAR HAVE YOU FOUND THAT YOU WERE NOT ABLE TO STOP DRINKING ONCE YOU HAD STARTED: 0
HOW OFTEN DURING THE LAST YEAR HAVE YOU HAD A FEELING OF GUILT OR REMORSE AFTER DRINKING: 0
AUDIT TOTAL SCORE: 0
AUDIT-C TOTAL SCORE: 2
HOW OFTEN DURING THE LAST YEAR HAVE YOU BEEN UNABLE TO REMEMBER WHAT HAPPENED THE NIGHT BEFORE BECAUSE YOU HAD BEEN DRINKING: 0
HOW OFTEN DURING THE LAST YEAR HAVE YOU FAILED TO DO WHAT WAS NORMALLY EXPECTED FROM YOU BECAUSE OF DRINKING: NEVER
HOW OFTEN DO YOU HAVE SIX OR MORE DRINKS ON ONE OCCASION: NEVER
HOW MANY STANDARD DRINKS CONTAINING ALCOHOL DO YOU HAVE ON A TYPICAL DAY: ONE OR TWO
HOW OFTEN DO YOU HAVE A DRINK CONTAINING ALCOHOL: TWO TO FOUR TIMES A MONTH
HOW OFTEN DURING THE LAST YEAR HAVE YOU FAILED TO DO WHAT WAS NORMALLY EXPECTED FROM YOU BECAUSE OF DRINKING: 0
HOW OFTEN DO YOU HAVE A DRINK CONTAINING ALCOHOL: 2
HOW OFTEN DURING THE LAST YEAR HAVE YOU NEEDED AN ALCOHOLIC DRINK FIRST THING IN THE MORNING TO GET YOURSELF GOING AFTER A NIGHT OF HEAVY DRINKING: 0
HAVE YOU OR SOMEONE ELSE BEEN INJURED AS A RESULT OF YOUR DRINKING: NO
HOW OFTEN DO YOU HAVE SIX OR MORE DRINKS ON ONE OCCASION: 0

## 2020-11-16 ASSESSMENT — PATIENT HEALTH QUESTIONNAIRE - PHQ9
SUM OF ALL RESPONSES TO PHQ QUESTIONS 1-9: 0
SUM OF ALL RESPONSES TO PHQ QUESTIONS 1-9: 0
SUM OF ALL RESPONSES TO PHQ9 QUESTIONS 1 & 2: 0
2. FEELING DOWN, DEPRESSED OR HOPELESS: 0
SUM OF ALL RESPONSES TO PHQ QUESTIONS 1-9: 0
1. LITTLE INTEREST OR PLEASURE IN DOING THINGS: 0

## 2020-11-16 NOTE — PROGRESS NOTES
Medicare Annual Wellness Visit  Name: Cahris Palmer Date: 2020   MRN: [id number] Sex: Male   Age: 69 y.o. Ethnicity: Non-/Non    : 1950 Race: Omar Bateman is here for Annual Exam (AWV)    Screenings for behavioral, psychosocial and functional/safety risks, and cognitive dysfunction are all negative except as indicated below. These results, as well as other patient data from the [address] form, are documented in Flowsheets linked to this Encounter. No Known Allergies    Prior to Visit Medications    Medication Sig Taking?  Authorizing Provider   allopurinol (ZYLOPRIM) 300 MG tablet TAKE ONE TABLET BY MOUTH EVERY DAY Yes Keena Aldridge MD   omeprazole (PRILOSEC) 40 MG delayed release capsule TAKE 1 CAPSULE DAILY Yes Keena Aldridge MD   pravastatin (PRAVACHOL) 20 MG tablet TAKE ONE TABLET BY MOUTH EVERY DAY Yes Polo Ruiz, DO   metoprolol succinate (TOPROL XL) 50 MG extended release tablet TAKE ONE TABLET BY MOUTH DAILY Yes Polo Ruiz, DO   naproxen (NAPROSYN) 500 MG tablet Take 500 mg by mouth 2 qd Yes Historical Provider, MD   levETIRAcetam (KEPPRA) 250 MG tablet TAKE ONE TABLET BY MOUTH TWO TIMES A DAY Yes Keena Aldridge MD   XARELTO 20 MG TABS tablet TAKE ONE TABLET BY MOUTH EVERY DAY Yes Historical Provider, MD   colchicine (COLCRYS) 0.6 MG tablet Take 1 tablet by mouth daily as needed for Pain Yes Keena Aldridge MD       Past Medical History:   Diagnosis Date    BBB (bundle branch block)     RIGHT    CAD (coronary artery disease) 2015    Cardiomyopathy, nonischemic ([facility] 2018    Chronic kidney disease     GERD (gastroesophageal reflux disease)     Hyperlipidemia     Hypertension     Paroxysmal atrial fibrillation ([facility] 2018    Pelvic fracture ([facility]     REMOTE    Retinal artery occlusion 2015       Past Surgical History:   Procedure Laterality Date    ANGIOPLASTY      ANKLE FRACTURE SURGERY Right     [facility]    [facility]2016    [facility]    COLONOSCOPY N/A 10/5/2020    COLONOSCOPY WITH POLYPECTOMY performed by Leon Granger MD at [address] [place] [address]      ROTATOR CUFF REPAIR         Family History   Problem Relation Age of Onset    Heart Disease Mother     Kidney Disease Father        CareTeam (Including outside providers/suppliers regularly involved in providing care):   Patient Care Team:  Magy Peña MD as PCP - General (Family Medicine)  Magy Peña MD as PCP [facility]    Wt Readings from Last 3 Encounters:   10/05/20 220 lb (99.8 kg)   07/27/20 223 lb 3.2 oz (101.2 kg)   05/01/20 226 lb (102.5 kg)     No flowsheet data found. There is no height or weight on file to calculate BMI. Virtual visit  Based upon direct observation of the patient, evaluation of cognition reveals recent and remote memory intact. Patient's complete Health Risk Assessment and screening values have been reviewed and are found in Flowsheets. The following problems were reviewed today and where indicated follow up appointments were made and/or referrals ordered. Positive Risk Factor Screenings with Interventions:     General Health and ACP:  General  In general, how would you say your health is?: Very Good  In the past 7 days, have you experienced any of the following?  New or Increased Pain, New or Increased Fatigue, Loneliness, Social Isolation, Stress or Anger?: None of These  Do you get the social and emotional support that you need?: Yes  Do you have a Living Will?: Yes  Advance Directives     Power of  Living Will ACP-Advance Directive ACP-Power of     Not on File Not on File Filed [address] [facility] [place] Risk Interventions:  · none    Hearing/Vision:  No exam data present  Hearing/Vision  Do you or your family notice any trouble with your hearing?: (!) Yes  Do you have difficulty the [employer] Act, [facility] waiver authority and the Coronavirus Preparedness and [employer] Act, this Virtual Visit was conducted with patient's (and/or legal guardian's) consent, to reduce the patient's risk of exposure to COVID-19 and provide necessary medical care. The patient (and/or legal guardian) has also been advised to contact this office for worsening conditions or problems, and seek emergency medical treatment and/or call 911 if deemed necessary. Patient identification was verified at the start of the visit: Yes    Services were provided through a video synchronous discussion virtually to substitute for in-person clinic visit. Patient and provider were located at their individual homes. --Rodrigo Castrejon MD on 11/16/2020 at 8:13 AM    An electronic signature was used to authenticate this note.

## 2020-11-25 ENCOUNTER — OFFICE VISIT (OUTPATIENT)
Dept: FAMILY MEDICINE CLINIC | Age: 70
End: 2020-11-25
Payer: MEDICARE

## 2020-11-25 VITALS
WEIGHT: 220 LBS | HEART RATE: 69 BPM | BODY MASS INDEX: 29.8 KG/M2 | OXYGEN SATURATION: 98 % | TEMPERATURE: 97.7 F | HEIGHT: 72 IN | SYSTOLIC BLOOD PRESSURE: 104 MMHG | DIASTOLIC BLOOD PRESSURE: 64 MMHG

## 2020-11-25 PROCEDURE — 3017F COLORECTAL CA SCREEN DOC REV: CPT | Performed by: PHYSICIAN ASSISTANT

## 2020-11-25 PROCEDURE — 90471 IMMUNIZATION ADMIN: CPT | Performed by: PHYSICIAN ASSISTANT

## 2020-11-25 PROCEDURE — G8427 DOCREV CUR MEDS BY ELIG CLIN: HCPCS | Performed by: PHYSICIAN ASSISTANT

## 2020-11-25 PROCEDURE — 4040F PNEUMOC VAC/ADMIN/RCVD: CPT | Performed by: PHYSICIAN ASSISTANT

## 2020-11-25 PROCEDURE — G8482 FLU IMMUNIZE ORDER/ADMIN: HCPCS | Performed by: PHYSICIAN ASSISTANT

## 2020-11-25 PROCEDURE — 90715 TDAP VACCINE 7 YRS/> IM: CPT | Performed by: PHYSICIAN ASSISTANT

## 2020-11-25 PROCEDURE — 1036F TOBACCO NON-USER: CPT | Performed by: PHYSICIAN ASSISTANT

## 2020-11-25 PROCEDURE — G8417 CALC BMI ABV UP PARAM F/U: HCPCS | Performed by: PHYSICIAN ASSISTANT

## 2020-11-25 PROCEDURE — 99213 OFFICE O/P EST LOW 20 MIN: CPT | Performed by: PHYSICIAN ASSISTANT

## 2020-11-25 PROCEDURE — 1123F ACP DISCUSS/DSCN MKR DOCD: CPT | Performed by: PHYSICIAN ASSISTANT

## 2020-11-25 RX ORDER — CEPHALEXIN 500 MG/1
500 CAPSULE ORAL 2 TIMES DAILY
Qty: 14 CAPSULE | Refills: 0 | Status: SHIPPED | OUTPATIENT
Start: 2020-11-25 | End: 2020-12-02

## 2020-11-25 RX ORDER — SULFAMETHOXAZOLE AND TRIMETHOPRIM 800; 160 MG/1; MG/1
1 TABLET ORAL 2 TIMES DAILY
Qty: 20 TABLET | Refills: 0 | Status: SHIPPED | OUTPATIENT
Start: 2020-11-25 | End: 2020-12-05

## 2020-11-25 ASSESSMENT — ENCOUNTER SYMPTOMS
CHEST TIGHTNESS: 0
EYE ITCHING: 0
ABDOMINAL PAIN: 0
EYE PAIN: 0
DIARRHEA: 0
VOMITING: 0
TROUBLE SWALLOWING: 0
COUGH: 0
SHORTNESS OF BREATH: 0
BACK PAIN: 0
SINUS PRESSURE: 0
EYE DISCHARGE: 0

## 2020-11-25 ASSESSMENT — PATIENT HEALTH QUESTIONNAIRE - PHQ9
DEPRESSION UNABLE TO ASSESS: YES
SUM OF ALL RESPONSES TO PHQ9 QUESTIONS 1 & 2: 0
1. LITTLE INTEREST OR PLEASURE IN DOING THINGS: NOT AT ALL
2. FEELING DOWN, DEPRESSED OR HOPELESS: NOT AT ALL

## 2020-11-25 NOTE — PATIENT INSTRUCTIONS
are not taking a prescription pain medicine, ask your doctor if you can take an over-the-counter medicine. · If your doctor prescribed antibiotics, take them as directed. Do not stop taking them just because you feel better. You need to take the full course of antibiotics. When should you call for help? Call your doctor now or seek immediate medical care if:    · You have new pain, or your pain gets worse.     · The wound starts to bleed, and blood soaks through the bandage. Oozing small amounts of blood is normal.     · The skin near the wound is cold or pale or changes color.     · You have tingling, weakness, or numbness near the wound.     · You have trouble moving the area near the wound.     · You have symptoms of infection, such as:  ? Increased pain, swelling, warmth, or redness around the wound. ? Red streaks leading from the wound. ? Pus draining from the wound. ? A fever. Watch closely for changes in your health, and be sure to contact your doctor if:    · The wound is not closing (getting smaller).     · You do not get better as expected. Where can you learn more? Go to [email or web address]://[email or web address]. org and sign in to your [employer] account. Enter N023 in the [facility] box to learn more about \"Puncture Wounds: Care Instructions. \"     If you do not have an account, please click on the \"Sign Up Now\" link. Current as of: June 26, 2019               Content Version: 12.6  © 7561-0430 [employer]. Care instructions adapted under license by [facility] ([facility]). If you have questions about a medical condition or this instruction, always ask your healthcare professional. [place] [address] any warranty or liability for your use of this information.

## 2020-11-25 NOTE — PROGRESS NOTES
Subjective:      Patient ID: Angela Smith is a 69 y.o. 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 The nail broke the skin in the area between the great toe and the second toe of the right foot. The patient states that he knew the nail went in and confirmed it went he took the shoe off and saw blood.  His last tetanus shot was greater than 5 years ago    Past Medical History:   Diagnosis Date    BBB (bundle branch block)     RIGHT    CAD (coronary artery disease) 1/16/2015    Cardiomyopathy[facility]ischemic ([facility] 8/23/2018    Chronic kidney disease     GERD (gastroesophageal reflux disease)     Hyperlipidemia     Hypertension     Paroxysmal atrial fibrillation ([facility] 5/18/2018    Pelvic fracture (HCC)     REMOTE    Retinal artery occlusion 1/16/2015     Past Surgical History:   Procedure Laterality Date    ANGIOPLASTY      ANKLE FRACTURE SURGERY Right     [facility]    [facility]    COLONOSCOPY N/A 10/5/2020    COLONOSCOPY WITH POLYPECTOMY performed by Presley Gamboa MD at [address] [place] [address]      ROTATOR CUFF REPAIR       Social History     Socioeconomic History    Marital status:      Spouse name: Not on file    Number of children: Not on file    Years of education: Not on file    Highest education level: Not on file   Occupational History    Not on file   Social Needs    Financial resource strain: Not hard at all   [place] insecurity     Worry: Never true     Inability: Never true    Transportation needs     Medical: No     Non-medical: No   Tobacco Use    Smoking status: Former Smoker     Packs/day: 1.00     Years: 40.00     Pack years: 40.00     Start date: 1967     Last attempt to quit: 9/12/2007 tablet by mouth daily as needed for Pain 30 tablet 3     No current facility-administered medications for this visit. Review of Systems   Constitutional: Negative for activity change, appetite change, chills, fever and unexpected weight change. HENT: Negative for drooling, ear pain, nosebleeds, sinus pressure and trouble swallowing. Eyes: Negative for pain, discharge and itching. Respiratory: Negative for cough, chest tightness and shortness of breath. Cardiovascular: Negative for chest pain and leg swelling. Gastrointestinal: Negative for abdominal pain, diarrhea and vomiting. Endocrine: Negative for polydipsia and polyphagia. Genitourinary: Negative for dysuria, flank pain and frequency. Musculoskeletal: Negative for back pain and myalgias. Skin: Negative for pallor and rash. Neurological: Negative for syncope, weakness and headaches. Hematological: Does not bruise/bleed easily. Psychiatric/Behavioral: Negative for agitation, behavioral problems and confusion. All other systems reviewed and are negative. Objective:   /64 (Site: Right Upper Arm, Position: Sitting, Cuff Size: Large Adult)   Pulse 69   Temp 97.7 °F (36.5 °C) (Temporal)   Ht 6' (1.829 m)   Wt 220 lb (99.8 kg)   SpO2 98%   BMI 29.84 kg/m²     Physical Exam  Musculoskeletal:        Feet:    Feet:      Comments: Puncture         Assessment:       Diagnosis Orders   1. Injury of right foot, initial encounter  Tdap (age 6y and older) IM (BOOSTRIX)    cephALEXin (KEFLEX) 500 MG capsule    sulfamethoxazole-trimethoprim (BACTRIM DS;SEPTRA DS) 800-160 MG per tablet   2. Puncture wound of right foot, initial encounter  Tdap (age 6y and older) IM (BOOSTRIX)    cephALEXin (KEFLEX) 500 MG capsule    sulfamethoxazole-trimethoprim (BACTRIM DS;SEPTRA DS) 800-160 MG per tablet     No results found for this visit on 11/25/20. Plan:     Assessment & Plan   Kylie Fitch was seen today for foot injury.     Diagnoses and all orders for this visit:    Injury of right foot, initial encounter  -     Tdap (age 6y and older) IM (BOOSTRIX)  -     cephALEXin (KEFLEX) 500 MG capsule; Take 1 capsule by mouth 2 times daily for 7 days  -     sulfamethoxazole-trimethoprim (BACTRIM DS;SEPTRA DS) 800-160 MG per tablet; Take 1 tablet by mouth 2 times daily for 10 days    Puncture wound of right foot, initial encounter  -     Tdap (age 6y and older) IM (BOOSTRIX)  -     cephALEXin (KEFLEX) 500 MG capsule; Take 1 capsule by mouth 2 times daily for 7 days  -     sulfamethoxazole-trimethoprim (BACTRIM DS;SEPTRA DS) 800-160 MG per tablet; Take 1 tablet by mouth 2 times daily for 10 days      Orders Placed This Encounter   Procedures    Tdap (age 6y and older) IM ([address])     Orders Placed This Encounter   Medications    cephALEXin (KEFLEX) 500 MG capsule     Sig: Take 1 capsule by mouth 2 times daily for 7 days     Dispense:  14 capsule     Refill:  0    sulfamethoxazole-trimethoprim (BACTRIM DS;SEPTRA DS) 800-160 MG per tablet     Sig: Take 1 tablet by mouth 2 times daily for 10 days     Dispense:  20 tablet     Refill:  0     There are no discontinued medications. Return if symptoms worsen or fail to improve. Reviewed with the patient/family: current clinical status & medications. Side effects of the medication prescribed today, as well as treatment plan/rationale and result expectations have been discussed with the patient/family who expresses understanding. Patient will be discharged home in stable condition. Follow up with PCP to evaluate treatment results or return if symptoms worsen or fail to improve. Discussed signs and symptoms which require immediate follow-up in ED/call to 911. Understanding verbalized. I have reviewed the patient's medical history in detail and updated the computerized patient record.     RACIEL Garcia

## 2020-12-08 ENCOUNTER — HOSPITAL ENCOUNTER (OUTPATIENT)
Dept: ULTRASOUND IMAGING | Age: 70
Discharge: HOME | End: 2020-12-10
Payer: MEDICARE

## 2020-12-08 PROCEDURE — 76706 US ABDL AORTA SCREEN AAA: CPT

## 2020-12-11 ENCOUNTER — OFFICE VISIT (OUTPATIENT)
Dept: CARDIOLOGY CLINIC | Age: 70
End: 2020-12-11
Payer: MEDICARE

## 2020-12-11 VITALS
SYSTOLIC BLOOD PRESSURE: 122 MMHG | BODY MASS INDEX: 30.52 KG/M2 | WEIGHT: 225 LBS | HEART RATE: 63 BPM | DIASTOLIC BLOOD PRESSURE: 74 MMHG

## 2020-12-11 PROCEDURE — 93000 ELECTROCARDIOGRAM COMPLETE: CPT | Performed by: INTERNAL MEDICINE

## 2020-12-11 PROCEDURE — 3017F COLORECTAL CA SCREEN DOC REV: CPT | Performed by: INTERNAL MEDICINE

## 2020-12-11 PROCEDURE — 4040F PNEUMOC VAC/ADMIN/RCVD: CPT | Performed by: INTERNAL MEDICINE

## 2020-12-11 PROCEDURE — 99214 OFFICE O/P EST MOD 30 MIN: CPT | Performed by: INTERNAL MEDICINE

## 2020-12-11 PROCEDURE — 1036F TOBACCO NON-USER: CPT | Performed by: INTERNAL MEDICINE

## 2020-12-11 PROCEDURE — G8482 FLU IMMUNIZE ORDER/ADMIN: HCPCS | Performed by: INTERNAL MEDICINE

## 2020-12-11 PROCEDURE — G8427 DOCREV CUR MEDS BY ELIG CLIN: HCPCS | Performed by: INTERNAL MEDICINE

## 2020-12-11 PROCEDURE — 1123F ACP DISCUSS/DSCN MKR DOCD: CPT | Performed by: INTERNAL MEDICINE

## 2020-12-11 PROCEDURE — G8417 CALC BMI ABV UP PARAM F/U: HCPCS | Performed by: INTERNAL MEDICINE

## 2020-12-11 RX ORDER — METOPROLOL SUCCINATE 50 MG/1
TABLET, EXTENDED RELEASE ORAL
Qty: 90 TABLET | Refills: 3 | Status: SHIPPED | OUTPATIENT
Start: 2020-12-11 | End: 2021-03-29

## 2020-12-11 RX ORDER — PRAVASTATIN SODIUM 20 MG
TABLET ORAL
Qty: 90 TABLET | Refills: 3 | Status: SHIPPED | OUTPATIENT
Start: 2020-12-11 | End: 2021-03-29

## 2020-12-11 NOTE — PROGRESS NOTES
Chief Complaint   Patient presents with    Atrial Fibrillation    Coronary Artery Disease    Cardiomyopathy    Hypertension    Hyperlipidemia    6 Month Follow-Up       1-16-15: Patient presents for initial medical evaluation. Patient is followed on a regular basis by Dr. Carlos Manuel Carbajal MD. S/p abnormal nuclear stress test with normal LVF, s/p LHC with 40-50% mid LAD stenosis with ? Myocardial bridgigng, normal LVF. Doing well overall. Pt denies chest pain, dyspnea, dyspnea on exertion, change in exercise capacity, fatigue,  nausea, vomiting, diarrhea, constipation, motor weakness, insomnia, weight loss, syncope, dizziness, lightheadedness, palpitations, PND, orthopnea, or claudication. Did have some eye issue that started all of the cardiac workup. Following with opthamologist. S/p carotid US with 1-15% stenosis b/l.     1-30-15: as above, s/p Echo with normal LVF, EF of 60%, mild MR, stage I DD. Left eye is feeling better. Pt denies chest pain, dyspnea, dyspnea on exertion, change in exercise capacity, fatigue,  nausea, vomiting, diarrhea, constipation, motor weakness, insomnia, weight loss, syncope, dizziness, lightheadedness, palpitations, PND, orthopnea, or claudication. 7-3-15: as above, Pt denies chest pain, dyspnea, dyspnea on exertion, change in exercise capacity, fatigue,  nausea, vomiting, diarrhea, constipation, motor weakness, insomnia, weight loss, syncope, dizziness, lightheadedness, palpitations, PND, orthopnea, or claudication. Compliant with meds. No hospitalizations. No LE edema. BP is under control. No muscle or body aches. Lipid profile in Jan/2015 was normal.     1-29-16: as above, apparently developed a syncopal episode a month ago, was hospitalized at [facility] with bradycardia, s/p PPM. Was also noted to have seizures on further testing.  Pt denies chest pain, dyspnea, dyspnea on exertion, change in exercise capacity, fatigue,  nausea, vomiting, diarrhea, constipation, motor weakness, insomnia, weight loss, syncope, dizziness, lightheadedness, palpitations, PND, orthopnea, or claudication. Lipid profile and previous labs reviwed. BP is good. CAD is stable. 3-4-16: as above, s/p hospitalization due to severe CP which was felt to be atypical and MSCK. His BP was high and was placed on his Toprol XL again and feeling much better now. Symptoms have resolved. Pt denies chest pain, dyspnea, dyspnea on exertion, change in exercise capacity, fatigue,  nausea, vomiting, diarrhea, constipation, motor weakness, insomnia, weight loss, syncope, dizziness, lightheadedness, palpitations, PND, orthopnea, or claudication. Bp is good. CAD is stable. PPM site is healing. No seizures. 9-9-16: as above, Pt denies chest pain, dyspnea, dyspnea on exertion, change in exercise capacity, fatigue,  nausea, vomiting, diarrhea, constipation, motor weakness, insomnia, weight loss, syncope, dizziness, lightheadedness, palpitations, PND, orthopnea, or claudication. S/p PPM check with Dr. Jett Jovel. No CHF type symptoms. No hospitalizations. No change in meds. BP and HR are good. Playing golf, going to gym, yard work and no symptoms. No Le ulcers or dicoloration. 5-19-17: Pt denies chest pain, dyspnea, dyspnea on exertion, change in exercise capacity, fatigue,  nausea, vomiting, diarrhea, constipation, motor weakness, insomnia, weight loss, syncope, dizziness, lightheadedness, palpitations, PND, orthopnea, or claudication. No nitro use. BP and hr are good. CAD is stable. No LE discoloration or ulcers. No LE edema. No CHF type symptoms. Lipid profile is normal.  Pacer check is good. EKG with NSR, lbbb.     5-18-18: Pt denies chest pain, dyspnea, dyspnea on exertion, change in exercise capacity, fatigue,  nausea, vomiting, diarrhea, constipation, motor weakness, insomnia, weight loss, syncope, dizziness, lightheadedness, palpitations, PND, orthopnea, or claudication. No nitro use. BP and hr are good. CAD is stable.  No LE discoloration or ulcers. No LE edema. No CHF type symptoms. Lipid profile is normal. No recent hospitalization. No change in meds. s/p PPM check in 11/17 and noted to havesome afibb episodes. He is on NOAC in 10/17 and no bleeding issues. EKG with NSR, LBBB. No neuro type symptoms. 7-24-18: Was pulling weeds in his garden yesterday, was bent down had his head between his legs and went ot his tool shed and on his way back to the garden he experienced a syncopal episode. He had no warning symptoms. He denies having any CP or SOB. No LH/dizz. He denies any seizure like activity, no loss of bowel or bladder function. He woke up on his own, was down for a brief moment he states. He got up to a chair on his own. Had facial trauma. Does not feel like he was dehydrated. He is on NOAC for hx of Pafibb. His BP and HR are good today. No melena. EKG with NSR, LBBB. He exercises on a regular basis without any symptoms. 8-23-18: s/p follow up with EP, PPM check is ok per patient. Labs reviewed with MCV of 100. Renal function was abnormal at GFR of 57 and CR of 1.24. He is on ACEI. S/p ECHO with EF of 40-45%, grade I DD. Pt denies chest pain, dyspnea, dyspnea on exertion, change in exercise capacity, fatigue,  nausea, vomiting, diarrhea, constipation, motor weakness, insomnia, weight loss, syncope, dizziness, lightheadedness, palpitations, PND, orthopnea, or claudication. No nitro use. BP and hr are good. CAD is stable. No LE discoloration or ulcers. No LE edema. No CHF type symptoms. Lipid profile is normal. No recent hospitalization. No change in meds. Has PAfibb and on [facility], no bleeding issues. 5-3-19: s/p LHC in 8/18 with 30-40% stenosis of mid LAD, EF of 50%. Was noted recently to have elevated K+, ACEI was stopped and lopressor was increased. Does have baseline CKD, stage III.  Pt denies chest pain, dyspnea, dyspnea on exertion, change in exercise capacity, fatigue,  nausea, vomiting, diarrhea, constipation, motor weakness, insomnia, weight loss, syncope, dizziness, lightheadedness, palpitations, PND, orthopnea, or claudication. No nitro use. BP and hr are good. CAD is stable. No LE discoloration or ulcers. No LE edema. No CHF type symptoms. Lipid profile is normal. No recent hospitalization. Hx of Pafib s/p PPM. On DOAC, no bleeding issues. 12-27-19: EKG with NSR, RBBB. Pt denies chest pain, dyspnea, dyspnea on exertion, change in exercise capacity, fatigue,  nausea, vomiting, diarrhea, constipation, motor weakness, insomnia, weight loss, syncope, dizziness, lightheadedness, palpitations, PND, orthopnea, or claudication. No nitro use. BP and hr are good. CAD is stable. No LE discoloration or ulcers. No LE edema. No CHF type symptoms. Lipid profile is normal. No recent hospitalization. No change in meds. Following up with EP. Remain on DOAC for Pafib, no bleeding issues. Does have CKD, GFR of 56. LDL is 83. He is working out without any issues. 12/11/2020: Patient is doing well overall. He denies any symptoms of angina or congestive heart failure. He is compliant with his medications. No recent hospitalizations. Patient with history of sick sinus syndrome status post permanent pacemaker placement. Following with the EP. Patient with history of paroxysmal atrial fibrillation on oral anticoagulation. He denies any bleeding issues. History of mild coronary disease status post cardiac catheterization in August 2018 with 30 to 40% stenosis of his mid LAD. Does have stage III chronic kidney disease, GFR 58.5. Status post echocardiogram on July 2020 with ejection fraction of 40%, grade 1 diastolic dysfunction no valve abnormalities. EKG with sinus rhythm, right bundle branch block.       Patient Active Problem List   Diagnosis    BBB (bundle branch block)    Hypertension    Chronic kidney disease    Hyperlipidemia    GERD (gastroesophageal reflux disease)    Adjustment disorder with mixed anxiety and depressed mood    Chronic low back pain    CAD (coronary artery disease)    Retinal artery occlusion    Pacemaker    Paroxysmal atrial fibrillation (HCC)    Cardiomyopathy ([facility] [address]    Adenomatous polyp of ascending colon       Past Surgical History:   Procedure Laterality Date    ANGIOPLASTY      ANKLE FRACTURE SURGERY Right     [facility]    [facility]    [facility]AL Right 2016    [facility]    COLONOSCOPY N/A 10/5/2020    COLONOSCOPY WITH POLYPECTOMY performed by Herve Krishna MD at [address] [place] [address]      ROTATOR CUFF REPAIR         Social History     Socioeconomic History    Marital status:      Spouse name: Not on file    Number of children: Not on file    Years of education: Not on file    Highest education level: Not on file   Occupational History    Not on file   Social Needs    Financial resource strain: Not hard at all   [employer] insecurity     Worry: Never true     Inability: Never true   [address] needs     Medical: No     Non-medical: No   Tobacco Use    Smoking status: Former Smoker     Packs/day: 1.00     Years: 40.00     Pack years: 40.00     Start date:      Last attempt to quit: 2007     Years since quittin.2    Smokeless tobacco: Never Used   Substance and Sexual Activity    Alcohol use:  Yes    Drug use: No    Sexual activity: Not on file   Lifestyle    Physical activity     Days per week: Not on file     Minutes per session: Not on file    Stress: Not on file   Relationships    Social connections     Talks on phone: Not on file     Gets together: Not on file     Attends Taoism service: Not on file     Active member of club or organization: Not on file     Attends meetings of clubs or organizations: Not on file     Relationship status: Not on file    Intimate partner violence     Fear of current or ex partner: Not on file     Emotionally abused: Not on file     Physically abused: Not on file     Forced sexual activity: Not on file   Other Topics Concern    Not on file   Social History Narrative    Not on file       Family History   Problem Relation Age of Onset    Heart Disease Mother     Kidney Disease Father        Current Outpatient Medications   Medication Sig Dispense Refill    metoprolol succinate (TOPROL XL) 50 MG extended release tablet TAKE ONE TABLET BY MOUTH DAILY 90 tablet 3    pravastatin (PRAVACHOL) 20 MG tablet TAKE ONE TABLET BY MOUTH EVERY DAY 90 tablet 3    allopurinol (ZYLOPRIM) 300 MG tablet TAKE ONE TABLET BY MOUTH EVERY DAY 90 tablet 3    omeprazole (PRILOSEC) 40 MG delayed release capsule TAKE 1 CAPSULE DAILY 90 capsule 2    naproxen (NAPROSYN) 500 MG tablet Take 500 mg by mouth 2 qd      levETIRAcetam (KEPPRA) 250 MG tablet TAKE ONE TABLET BY MOUTH TWO TIMES A  tablet 0    XARELTO 20 MG TABS tablet TAKE ONE TABLET BY MOUTH EVERY DAY  3    colchicine (COLCRYS) 0.6 MG tablet Take 1 tablet by mouth daily as needed for Pain 30 tablet 3     No current facility-administered medications for this visit. Patient has no known allergies. Review of Systems:  General ROS: negative  Psychological ROS: negative  Hematological and Lymphatic ROS: No history of blood clots or bleeding disorder. Respiratory ROS: no cough, shortness of breath, or wheezing  Cardiovascular ROS: no chest pain or dyspnea on exertion  negative  Gastrointestinal ROS: no abdominal pain, change in bowel habits, or black or bloody stools  Genito-Urinary ROS: no dysuria, trouble voiding, or hematuria  Musculoskeletal ROS: negative  Neurological ROS: no TIA or stroke symptoms  Dermatological ROS: negative    VITALS:  Blood pressure 122/74, pulse 63, weight 225 lb (102.1 kg). Body mass index is 30.52 kg/m².     Physical Examination:  General appearance - alert, well appearing, and in no distress  Mental status - alert, oriented to person, place, and time  Neck - Neck is supple, no JVD or carotid bruits. No thyromegaly or adenopathy. Chest - clear to auscultation, no wheezes, rales or rhonchi, symmetric air entry  Heart - normal rate, regular rhythm, normal S1, S2, no murmurs, rubs, clicks or gallops  Abdomen - soft, nontender, nondistended, no masses or organomegaly  Neurological - alert, oriented, normal speech, no focal findings or movement disorder noted  Extremities - peripheral pulses normal, no pedal edema, no clubbing or cyanosis  Skin - normal coloration and turgor, no rashes, no suspicious skin lesions noted    Orders Placed This Encounter   Procedures    EKG 12 Lead     NSR, LBBB. ASSESSMENT:     Diagnosis Orders   1. Paroxysmal atrial fibrillation (HCC)  EKG 12 Lead   2. Essential hypertension  metoprolol succinate (TOPROL XL) 50 MG extended release tablet   3. Coronary artery disease involving native coronary artery of native heart without angina pectoris     4. Mixed hyperlipidemia     5. Pacemaker     6. Chronic kidney disease, unspecified CKD stage           PLAN:     Patient will need to continue to follow up with you for their general medical care and return to see me in the office in 6 months    As always, aggressive risk factor modification is strongly recommended. We should adhere to the [address] guidelines for HTN management and the NCEP ATP III guidelines for LDL-C management. Cardiac diet is always recommended with low fat, cholesterol, calories and sodium. Follow up with Dr. Fredo Sneed and neuro. Recheck echocardiogram in the future. Continue with oral anticoagulation. Continue medications at current doses. Patient was advised and encouraged to check blood pressure at home or at a pharmacy, maintain a logbook, and also call us back if blood pressure are above the target ranges or if it is low. Patient clearly understands and agrees to the instructions.      We will need to continue to monitor muscle and liver

## 2021-03-19 ENCOUNTER — NURSE ONLY (OUTPATIENT)
Dept: FAMILY MEDICINE CLINIC | Age: 71
End: 2021-03-19
Payer: MEDICARE

## 2021-03-19 DIAGNOSIS — M10.9 GOUT, UNSPECIFIED CAUSE, UNSPECIFIED CHRONICITY, UNSPECIFIED SITE: Primary | ICD-10-CM

## 2021-03-19 PROCEDURE — 96372 THER/PROPH/DIAG INJ SC/IM: CPT | Performed by: FAMILY MEDICINE

## 2021-03-19 RX ORDER — METHYLPREDNISOLONE ACETATE 80 MG/ML
80 INJECTION, SUSPENSION INTRA-ARTICULAR; INTRALESIONAL; INTRAMUSCULAR; SOFT TISSUE ONCE
Status: COMPLETED | OUTPATIENT
Start: 2021-03-19 | End: 2021-03-19

## 2021-03-19 RX ADMIN — METHYLPREDNISOLONE ACETATE 80 MG: 80 INJECTION, SUSPENSION INTRA-ARTICULAR; INTRALESIONAL; INTRAMUSCULAR; SOFT TISSUE at 15:30

## 2021-06-17 RX ORDER — OMEPRAZOLE 40 MG/1
CAPSULE, DELAYED RELEASE ORAL
Qty: 90 CAPSULE | Refills: 2 | Status: SHIPPED | OUTPATIENT
Start: 2021-06-17 | End: 2022-03-14 | Stop reason: SDUPTHER

## 2021-06-18 ENCOUNTER — OFFICE VISIT (OUTPATIENT)
Dept: CARDIOLOGY CLINIC | Age: 71
End: 2021-06-18
Payer: MEDICARE

## 2021-06-18 VITALS
WEIGHT: 224 LBS | BODY MASS INDEX: 30.38 KG/M2 | DIASTOLIC BLOOD PRESSURE: 80 MMHG | SYSTOLIC BLOOD PRESSURE: 110 MMHG | HEART RATE: 64 BPM

## 2021-06-18 DIAGNOSIS — I42.9 CARDIOMYOPATHY, UNSPECIFIED TYPE (HCC): ICD-10-CM

## 2021-06-18 DIAGNOSIS — N18.9 CHRONIC KIDNEY DISEASE, UNSPECIFIED CKD STAGE: ICD-10-CM

## 2021-06-18 DIAGNOSIS — I10 ESSENTIAL HYPERTENSION: ICD-10-CM

## 2021-06-18 DIAGNOSIS — Z95.0 PACEMAKER: ICD-10-CM

## 2021-06-18 DIAGNOSIS — E78.2 MIXED HYPERLIPIDEMIA: ICD-10-CM

## 2021-06-18 DIAGNOSIS — I25.10 CORONARY ARTERY DISEASE INVOLVING NATIVE CORONARY ARTERY OF NATIVE HEART WITHOUT ANGINA PECTORIS: ICD-10-CM

## 2021-06-18 DIAGNOSIS — I48.0 PAROXYSMAL ATRIAL FIBRILLATION (HCC): Primary | ICD-10-CM

## 2021-06-18 DIAGNOSIS — I45.4 BBB (BUNDLE BRANCH BLOCK): ICD-10-CM

## 2021-06-18 PROCEDURE — 93000 ELECTROCARDIOGRAM COMPLETE: CPT | Performed by: INTERNAL MEDICINE

## 2021-06-18 PROCEDURE — 99214 OFFICE O/P EST MOD 30 MIN: CPT | Performed by: INTERNAL MEDICINE

## 2021-06-18 PROCEDURE — G8427 DOCREV CUR MEDS BY ELIG CLIN: HCPCS | Performed by: INTERNAL MEDICINE

## 2021-06-18 PROCEDURE — 1036F TOBACCO NON-USER: CPT | Performed by: INTERNAL MEDICINE

## 2021-06-18 PROCEDURE — 1123F ACP DISCUSS/DSCN MKR DOCD: CPT | Performed by: INTERNAL MEDICINE

## 2021-06-18 PROCEDURE — 3017F COLORECTAL CA SCREEN DOC REV: CPT | Performed by: INTERNAL MEDICINE

## 2021-06-18 PROCEDURE — G8417 CALC BMI ABV UP PARAM F/U: HCPCS | Performed by: INTERNAL MEDICINE

## 2021-06-18 PROCEDURE — 4040F PNEUMOC VAC/ADMIN/RCVD: CPT | Performed by: INTERNAL MEDICINE

## 2021-06-18 NOTE — PROGRESS NOTES
Chief Complaint   Patient presents with    Atrial Fibrillation    Coronary Artery Disease    Cardiomyopathy    Hypertension    Hyperlipidemia    6 Month Follow-Up       1-16-15: Patient presents for initial medical evaluation. Patient is followed on a regular basis by Dr. Isaias Desai MD. S/p abnormal nuclear stress test with normal LVF, s/p LHC with 40-50% mid LAD stenosis with ? Myocardial bridgigng, normal LVF. Doing well overall. Pt denies chest pain, dyspnea, dyspnea on exertion, change in exercise capacity, fatigue,  nausea, vomiting, diarrhea, constipation, motor weakness, insomnia, weight loss, syncope, dizziness, lightheadedness, palpitations, PND, orthopnea, or claudication. Did have some eye issue that started all of the cardiac workup. Following with opthamologist. S/p carotid US with 1-15% stenosis b/l.     1-30-15: as above, s/p Echo with normal LVF, EF of 60%, mild MR, stage I DD. Left eye is feeling better. Pt denies chest pain, dyspnea, dyspnea on exertion, change in exercise capacity, fatigue,  nausea, vomiting, diarrhea, constipation, motor weakness, insomnia, weight loss, syncope, dizziness, lightheadedness, palpitations, PND, orthopnea, or claudication. 7-3-15: as above, Pt denies chest pain, dyspnea, dyspnea on exertion, change in exercise capacity, fatigue,  nausea, vomiting, diarrhea, constipation, motor weakness, insomnia, weight loss, syncope, dizziness, lightheadedness, palpitations, PND, orthopnea, or claudication. Compliant with meds. No hospitalizations. No LE edema. BP is under control. No muscle or body aches. Lipid profile in Jan/2015 was normal.     1-29-16: as above, apparently developed a syncopal episode a month ago, was hospitalized at [facility] with bradycardia, s/p PPM. Was also noted to have seizures on further testing.  Pt denies chest pain, dyspnea, dyspnea on exertion, change in exercise capacity, fatigue,  nausea, vomiting, diarrhea, constipation, motor weakness, LE discoloration or ulcers. No LE edema. No CHF type symptoms. Lipid profile is normal. No recent hospitalization. No change in meds. s/p PPM check in 11/17 and noted to havesome afibb episodes. He is on NOAC in 10/17 and no bleeding issues. EKG with NSR, LBBB. No neuro type symptoms. 7-24-18: Was pulling weeds in his garden yesterday, was bent down had his head between his legs and went ot his tool shed and on his way back to the garden he experienced a syncopal episode. He had no warning symptoms. He denies having any CP or SOB. No LH/dizz. He denies any seizure like activity, no loss of bowel or bladder function. He woke up on his own, was down for a brief moment he states. He got up to a chair on his own. Had facial trauma. Does not feel like he was dehydrated. He is on NOAC for hx of Pafibb. His BP and HR are good today. No melena. EKG with NSR, LBBB. He exercises on a regular basis without any symptoms. 8-23-18: s/p follow up with EP, PPM check is ok per patient. Labs reviewed with MCV of 100. Renal function was abnormal at GFR of 57 and CR of 1.24. He is on ACEI. S/p ECHO with EF of 40-45%, grade I DD. Pt denies chest pain, dyspnea, dyspnea on exertion, change in exercise capacity, fatigue,  nausea, vomiting, diarrhea, constipation, motor weakness, insomnia, weight loss, syncope, dizziness, lightheadedness, palpitations, PND, orthopnea, or claudication. No nitro use. BP and hr are good. CAD is stable. No LE discoloration or ulcers. No LE edema. No CHF type symptoms. Lipid profile is normal. No recent hospitalization. No change in meds. Has PAfibb and on [address], no bleeding issues. 5-3-19: s/p LHC in 8/18 with 30-40% stenosis of mid LAD, EF of 50%. Was noted recently to have elevated K+, ACEI was stopped and lopressor was increased. Does have baseline CKD, stage III.  Pt denies chest pain, dyspnea, dyspnea on exertion, change in exercise capacity, fatigue,  nausea, vomiting, diarrhea, constipation, motor weakness, insomnia, weight loss, syncope, dizziness, lightheadedness, palpitations, PND, orthopnea, or claudication. No nitro use. BP and hr are good. CAD is stable. No LE discoloration or ulcers. No LE edema. No CHF type symptoms. Lipid profile is normal. No recent hospitalization. Hx of Pafib s/p PPM. On DOAC, no bleeding issues. 12-27-19: EKG with NSR, RBBB. Pt denies chest pain, dyspnea, dyspnea on exertion, change in exercise capacity, fatigue,  nausea, vomiting, diarrhea, constipation, motor weakness, insomnia, weight loss, syncope, dizziness, lightheadedness, palpitations, PND, orthopnea, or claudication. No nitro use. BP and hr are good. CAD is stable. No LE discoloration or ulcers. No LE edema. No CHF type symptoms. Lipid profile is normal. No recent hospitalization. No change in meds. Following up with EP. Remain on DOAC for Pafib, no bleeding issues. Does have CKD, GFR of 56. LDL is 83. He is working out without any issues. 12/11/2020: Patient is doing well overall. He denies any symptoms of angina or congestive heart failure. He is compliant with his medications. No recent hospitalizations. Patient with history of sick sinus syndrome status post permanent pacemaker placement. Following with the EP. Patient with history of paroxysmal atrial fibrillation on oral anticoagulation. He denies any bleeding issues. History of mild coronary disease status post cardiac catheterization in August 2018 with 30 to 40% stenosis of his mid LAD. Does have stage III chronic kidney disease, GFR 58.5. Status post echocardiogram on July 2020 with ejection fraction of 62%, grade 1 diastolic dysfunction no valve abnormalities. EKG with sinus rhythm, right bundle branch block.       6-18-21: Pt denies chest pain, dyspnea, dyspnea on exertion, change in exercise capacity, fatigue,  nausea, vomiting, diarrhea, constipation, motor weakness, insomnia, weight loss, syncope, dizziness, lightheadedness, palpitations, PND, orthopnea, or claudication. No nitro use. BP and hr are good. CAD is stable. No LE discoloration or ulcers. No LE edema. No CHF type symptoms. Lipid profile is normal. No recent hospitalization. No change in meds. History of mild coronary disease status post cardiac catheterization in 2018 with 30 to 40% stenosis of his mid LAD. Echo in 2020 with EF of 50%, grade I DD. Does have stage III chronic kidney disease. Hx of Pafib/SSS s/p PPM, following with EP. On DOAC. EKG with NSR, RBBB. Patient Active Problem List   Diagnosis    BBB (bundle branch block)    Hypertension    Chronic kidney disease    Hyperlipidemia    GERD (gastroesophageal reflux disease)    Adjustment disorder with mixed anxiety and depressed mood    Chronic low back pain    CAD (coronary artery disease)    Retinal artery occlusion    Pacemaker    Paroxysmal atrial fibrillation (HCC)    Cardiomyopathy ([address]    Adenomatous polyp of ascending colon       Past Surgical History:   Procedure Laterality Date    ANGIOPLASTY      ANKLE FRACTURE SURGERY Right     [facility]    [facility] Right [facility]    [facility]    COLONOSCOPY N/A 10/5/2020    COLONOSCOPY WITH POLYPECTOMY performed by Sina Aaron MD at [address] [place] [address] Road      PELVIC FRACTURE SURGERY      ROTATOR CUFF REPAIR         Social History     Socioeconomic History    Marital status:      Spouse name: Not on file    Number of children: Not on file    Years of education: Not on file    Highest education level: Not on file   Occupational History    Not on file   Tobacco Use    Smoking status: Former Smoker     Packs/day: 1.00     Years: 40.00     Pack years: 40.00     Start date: 5     Quit date: 2007     Years since quittin.7    Smokeless tobacco: Never Used   Substance and Sexual Activity    Alcohol use:  Yes    Drug use: No    Sexual activity: Not on file   Other Topics Concern    Not on file   Social History Narrative    Not on file     Social Determinants of Health     Financial Resource Strain: Low Risk     Difficulty of Paying Living Expenses: Not hard at all   Food Insecurity: No Food Insecurity    Worried About Running Out of Food in the Last Year: Never true    [address] in the Last Year: Never true   Transportation Needs: No Transportation Needs    Lack of Transportation (Medical): No    Lack of Transportation (Non-Medical):  No   Physical Activity:     Days of Exercise per Week:     Minutes of Exercise per Session:    Stress:     Feeling of Stress :    Social Connections:     Frequency of Communication with Friends and Family:     Frequency of Social Gatherings with Friends and Family:     Attends [employer] Services:     Active Member of Clubs or Organizations:     Attends Club or Organization Meetings:     Marital Status:    Intimate Partner Violence:     Fear of Current or Ex-Partner:     Emotionally Abused:     Physically Abused:     Sexually Abused:        Family History   Problem Relation Age of Onset    Heart Disease Mother     Kidney Disease Father        Current Outpatient Medications   Medication Sig Dispense Refill    omeprazole (PRILOSEC) 40 MG delayed release capsule TAKE 1 CAPSULE DAILY 90 capsule 2    metoprolol succinate (TOPROL XL) 50 MG extended release tablet TAKE 1 TABLET DAILY 90 tablet 3    pravastatin (PRAVACHOL) 20 MG tablet TAKE 1 TABLET DAILY 90 tablet 3    allopurinol (ZYLOPRIM) 300 MG tablet TAKE ONE TABLET BY MOUTH EVERY DAY 90 tablet 3    naproxen (NAPROSYN) 500 MG tablet Take 500 mg by mouth 2 qd      levETIRAcetam (KEPPRA) 250 MG tablet TAKE ONE TABLET BY MOUTH TWO TIMES A  tablet 0    XARELTO 20 MG TABS tablet TAKE ONE TABLET BY MOUTH EVERY DAY  3    colchicine (COLCRYS) 0.6 MG tablet Take 1 tablet by mouth daily as needed for Pain 30 tablet 3     No current PLAN:     Patient will need to continue to follow up with you for their general medical care and return to see me in the office in 6 months    As always, aggressive risk factor modification is strongly recommended. We should adhere to the [address] guidelines for HTN management and the NCEP ATP III guidelines for LDL-C management. Cardiac diet is always recommended with low fat, cholesterol, calories and sodium. Follow up with Dr. Bo Orellana and neuro. Recheck echocardiogram in the future. Continue with oral anticoagulation. Continue medications at current doses. Patient was advised and encouraged to check blood pressure at home or at a pharmacy, maintain a logbook, and also call us back if blood pressure are above the target ranges or if it is low. Patient clearly understands and agrees to the instructions. We will need to continue to monitor muscle and liver enzymes, BUN, CR, and electrolytes. Details of medical condition explained and patient was warned about adverse consequences of uncontrolled medical conditions and possible side effects of prescribed medications. Patient was advised to go to the ER if he starts experiencing adverse effects of the medications. patient was instructed to call us back or go to nearby emergency room immediately if symptoms get worse or do not improve. Thank you for allowing me to participate in the care of your patient, please don't hesitate to contact me if you have any further questions.

## 2021-10-31 DIAGNOSIS — M10.072 ACUTE IDIOPATHIC GOUT OF LEFT FOOT: ICD-10-CM

## 2021-11-01 RX ORDER — ALLOPURINOL 300 MG/1
TABLET ORAL
Qty: 90 TABLET | Refills: 0 | Status: SHIPPED | OUTPATIENT
Start: 2021-11-01 | End: 2022-01-11 | Stop reason: SDUPTHER

## 2021-12-10 ENCOUNTER — OFFICE VISIT (OUTPATIENT)
Dept: CARDIOLOGY CLINIC | Age: 71
End: 2021-12-10
Payer: MEDICARE

## 2021-12-10 VITALS
HEART RATE: 72 BPM | WEIGHT: 223 LBS | BODY MASS INDEX: 30.24 KG/M2 | DIASTOLIC BLOOD PRESSURE: 90 MMHG | SYSTOLIC BLOOD PRESSURE: 140 MMHG

## 2021-12-10 DIAGNOSIS — I45.4 BBB (BUNDLE BRANCH BLOCK): ICD-10-CM

## 2021-12-10 DIAGNOSIS — E78.2 MIXED HYPERLIPIDEMIA: ICD-10-CM

## 2021-12-10 DIAGNOSIS — I10 PRIMARY HYPERTENSION: ICD-10-CM

## 2021-12-10 DIAGNOSIS — I25.10 CORONARY ARTERY DISEASE INVOLVING NATIVE CORONARY ARTERY OF NATIVE HEART WITHOUT ANGINA PECTORIS: ICD-10-CM

## 2021-12-10 DIAGNOSIS — N18.9 CHRONIC KIDNEY DISEASE, UNSPECIFIED CKD STAGE: ICD-10-CM

## 2021-12-10 DIAGNOSIS — Z95.0 PACEMAKER: ICD-10-CM

## 2021-12-10 DIAGNOSIS — I48.0 PAROXYSMAL ATRIAL FIBRILLATION (HCC): Primary | ICD-10-CM

## 2021-12-10 PROCEDURE — G8427 DOCREV CUR MEDS BY ELIG CLIN: HCPCS | Performed by: INTERNAL MEDICINE

## 2021-12-10 PROCEDURE — G8484 FLU IMMUNIZE NO ADMIN: HCPCS | Performed by: INTERNAL MEDICINE

## 2021-12-10 PROCEDURE — 3017F COLORECTAL CA SCREEN DOC REV: CPT | Performed by: INTERNAL MEDICINE

## 2021-12-10 PROCEDURE — 93000 ELECTROCARDIOGRAM COMPLETE: CPT | Performed by: INTERNAL MEDICINE

## 2021-12-10 PROCEDURE — 99214 OFFICE O/P EST MOD 30 MIN: CPT | Performed by: INTERNAL MEDICINE

## 2021-12-10 PROCEDURE — 1036F TOBACCO NON-USER: CPT | Performed by: INTERNAL MEDICINE

## 2021-12-10 PROCEDURE — 1123F ACP DISCUSS/DSCN MKR DOCD: CPT | Performed by: INTERNAL MEDICINE

## 2021-12-10 PROCEDURE — 4040F PNEUMOC VAC/ADMIN/RCVD: CPT | Performed by: INTERNAL MEDICINE

## 2021-12-10 PROCEDURE — G8417 CALC BMI ABV UP PARAM F/U: HCPCS | Performed by: INTERNAL MEDICINE

## 2021-12-10 NOTE — PROGRESS NOTES
Chief Complaint   Patient presents with    Atrial Fibrillation    Cardiomyopathy    Hypertension    6 Month Follow-Up       1-16-15: Patient presents for initial medical evaluation. Patient is followed on a regular basis by Dr. Geronimo Brunson MD. S/p abnormal nuclear stress test with normal LVF, s/p LHC with 40-50% mid LAD stenosis with ? Myocardial bridgigng, normal LVF. Doing well overall. Pt denies chest pain, dyspnea, dyspnea on exertion, change in exercise capacity, fatigue,  nausea, vomiting, diarrhea, constipation, motor weakness, insomnia, weight loss, syncope, dizziness, lightheadedness, palpitations, PND, orthopnea, or claudication. Did have some eye issue that started all of the cardiac workup. Following with opthamologist. S/p carotid US with 1-15% stenosis b/l.     1-30-15: as above, s/p Echo with normal LVF, EF of 60%, mild MR, stage I DD. Left eye is feeling better. Pt denies chest pain, dyspnea, dyspnea on exertion, change in exercise capacity, fatigue,  nausea, vomiting, diarrhea, constipation, motor weakness, insomnia, weight loss, syncope, dizziness, lightheadedness, palpitations, PND, orthopnea, or claudication. 7-3-15: as above, Pt denies chest pain, dyspnea, dyspnea on exertion, change in exercise capacity, fatigue,  nausea, vomiting, diarrhea, constipation, motor weakness, insomnia, weight loss, syncope, dizziness, lightheadedness, palpitations, PND, orthopnea, or claudication. Compliant with meds. No hospitalizations. No LE edema. BP is under control. No muscle or body aches. Lipid profile in Jan/2015 was normal.     1-29-16: as above, apparently developed a syncopal episode a month ago, was hospitalized at [facility] with bradycardia, s/p PPM. Was also noted to have seizures on further testing.  Pt denies chest pain, dyspnea, dyspnea on exertion, change in exercise capacity, fatigue,  nausea, vomiting, diarrhea, constipation, motor weakness, insomnia, weight loss, syncope, dizziness, lightheadedness, palpitations, PND, orthopnea, or claudication. Lipid profile and previous labs reviwed. BP is good. CAD is stable. 3-4-16: as above, s/p hospitalization due to severe CP which was felt to be atypical and MSCK. His BP was high and was placed on his Toprol XL again and feeling much better now. Symptoms have resolved. Pt denies chest pain, dyspnea, dyspnea on exertion, change in exercise capacity, fatigue,  nausea, vomiting, diarrhea, constipation, motor weakness, insomnia, weight loss, syncope, dizziness, lightheadedness, palpitations, PND, orthopnea, or claudication. Bp is good. CAD is stable. PPM site is healing. No seizures. 9-9-16: as above, Pt denies chest pain, dyspnea, dyspnea on exertion, change in exercise capacity, fatigue,  nausea, vomiting, diarrhea, constipation, motor weakness, insomnia, weight loss, syncope, dizziness, lightheadedness, palpitations, PND, orthopnea, or claudication. S/p PPM check with Dr. Dedra Jiménez. No CHF type symptoms. No hospitalizations. No change in meds. BP and HR are good. Playing golf, going to gym, yard work and no symptoms. No Le ulcers or dicoloration. 5-19-17: Pt denies chest pain, dyspnea, dyspnea on exertion, change in exercise capacity, fatigue,  nausea, vomiting, diarrhea, constipation, motor weakness, insomnia, weight loss, syncope, dizziness, lightheadedness, palpitations, PND, orthopnea, or claudication. No nitro use. BP and hr are good. CAD is stable. No LE discoloration or ulcers. No LE edema. No CHF type symptoms. Lipid profile is normal.  Pacer check is good. EKG with NSR, lbbb.     5-18-18: Pt denies chest pain, dyspnea, dyspnea on exertion, change in exercise capacity, fatigue,  nausea, vomiting, diarrhea, constipation, motor weakness, insomnia, weight loss, syncope, dizziness, lightheadedness, palpitations, PND, orthopnea, or claudication. No nitro use. BP and hr are good. CAD is stable. No LE discoloration or ulcers. No LE edema.  No CHF type symptoms. Lipid profile is normal. No recent hospitalization. No change in meds. s/p PPM check in 11/17 and noted to havesome afibb episodes. He is on NOAC in 10/17 and no bleeding issues. EKG with NSR, LBBB. No neuro type symptoms. 7-24-18: Was pulling weeds in his garden yesterday, was bent down had his head between his legs and went ot his tool shed and on his way back to the garden he experienced a syncopal episode. He had no warning symptoms. He denies having any CP or SOB. No LH/dizz. He denies any seizure like activity, no loss of bowel or bladder function. He woke up on his own, was down for a brief moment he states. He got up to a chair on his own. Had facial trauma. Does not feel like he was dehydrated. He is on NOAC for hx of Pafibb. His BP and HR are good today. No melena. EKG with NSR, LBBB. He exercises on a regular basis without any symptoms. 8-23-18: s/p follow up with EP, PPM check is ok per patient. Labs reviewed with MCV of 100. Renal function was abnormal at GFR of 57 and CR of 1.24. He is on ACEI. S/p ECHO with EF of 40-45%, grade I DD. Pt denies chest pain, dyspnea, dyspnea on exertion, change in exercise capacity, fatigue,  nausea, vomiting, diarrhea, constipation, motor weakness, insomnia, weight loss, syncope, dizziness, lightheadedness, palpitations, PND, orthopnea, or claudication. No nitro use. BP and hr are good. CAD is stable. No LE discoloration or ulcers. No LE edema. No CHF type symptoms. Lipid profile is normal. No recent hospitalization. No change in meds. Has PAfibb and on [address], no bleeding issues. 5-3-19: s/p LHC in 8/18 with 30-40% stenosis of mid LAD, EF of 50%. Was noted recently to have elevated K+, ACEI was stopped and lopressor was increased. Does have baseline CKD, stage III.  Pt denies chest pain, dyspnea, dyspnea on exertion, change in exercise capacity, fatigue,  nausea, vomiting, diarrhea, constipation, motor weakness, insomnia, weight loss, syncope, dizziness, lightheadedness, palpitations, PND, orthopnea, or claudication. No nitro use. BP and hr are good. CAD is stable. No LE discoloration or ulcers. No LE edema. No CHF type symptoms. Lipid profile is normal. No recent hospitalization. Hx of Pafib s/p PPM. On DOAC, no bleeding issues. 12-27-19: EKG with NSR, RBBB. Pt denies chest pain, dyspnea, dyspnea on exertion, change in exercise capacity, fatigue,  nausea, vomiting, diarrhea, constipation, motor weakness, insomnia, weight loss, syncope, dizziness, lightheadedness, palpitations, PND, orthopnea, or claudication. No nitro use. BP and hr are good. CAD is stable. No LE discoloration or ulcers. No LE edema. No CHF type symptoms. Lipid profile is normal. No recent hospitalization. No change in meds. Following up with EP. Remain on DOAC for Pafib, no bleeding issues. Does have CKD, GFR of 56. LDL is 83. He is working out without any issues. 12/11/2020: Patient is doing well overall. He denies any symptoms of angina or congestive heart failure. He is compliant with his medications. No recent hospitalizations. Patient with history of sick sinus syndrome status post permanent pacemaker placement. Following with the EP. Patient with history of paroxysmal atrial fibrillation on oral anticoagulation. He denies any bleeding issues. History of mild coronary disease status post cardiac catheterization in August 2018 with 30 to 40% stenosis of his mid LAD. Does have stage III chronic kidney disease, GFR 58.5. Status post echocardiogram on July 2020 with ejection fraction of 08%, grade 1 diastolic dysfunction no valve abnormalities. EKG with sinus rhythm, right bundle branch block.       6-18-21: Pt denies chest pain, dyspnea, dyspnea on exertion, change in exercise capacity, fatigue,  nausea, vomiting, diarrhea, constipation, motor weakness, insomnia, weight loss, syncope, dizziness, lightheadedness, palpitations, PND, orthopnea, or claudication. No nitro use. BP and hr are good. CAD is stable. No LE discoloration or ulcers. No LE edema. No CHF type symptoms. Lipid profile is normal. No recent hospitalization. No change in meds. History of mild coronary disease status post cardiac catheterization in August 2018 with 30 to 40% stenosis of his mid LAD. Echo in 7/2020 with EF of 50%, grade I DD. Does have stage III chronic kidney disease. Hx of Pafib/SSS s/p PPM, following with EP. On DOAC. EKG with NSR, RBBB. 12-10-21: he is active/exercising without any issues, swimming and lifting weights. Pt denies chest pain, dyspnea, dyspnea on exertion, change in exercise capacity, fatigue,  nausea, vomiting, diarrhea, constipation, motor weakness, insomnia, weight loss, syncope, dizziness, lightheadedness, palpitations, PND, orthopnea, or claudication. No nitro use. BP and hr are good. CAD is stable. No LE discoloration or ulcers. No LE edema. No CHF type symptoms. Lipid profile is normal. No recent hospitalization. No change in meds. History of mild coronary disease status post cardiac catheterization in August 2018 with 30 to 40% stenosis of his mid LAD. Echo in 7/2020 with EF of 50%, grade I DD. Does have stage III chronic kidney disease. Hx of Pafib/SSS s/p PPM, following with EP. On DOAC. EKG with NSR, RBBB.       Patient Active Problem List   Diagnosis    BBB (bundle branch block)    Hypertension    Chronic kidney disease    Hyperlipidemia    GERD (gastroesophageal reflux disease)    Adjustment disorder with mixed anxiety and depressed mood    Chronic low back pain    CAD (coronary artery disease)    Retinal artery occlusion    Pacemaker    Paroxysmal atrial fibrillation ([address]    Cardiomyopathy ([address]    Adenomatous polyp of ascending colon       Past Surgical History:   Procedure Laterality Date    ANGIOPLASTY      ANKLE FRACTURE SURGERY Right     [facility]    [facility]    COLONOSCOPY N/A 10/5/2020    COLONOSCOPY WITH POLYPECTOMY performed by Jose Vincent MD at [address] [place] [address]      ROTATOR CUFF REPAIR         Social History     Socioeconomic History    Marital status:      Spouse name: Not on file    Number of children: Not on file    Years of education: Not on file    Highest education level: Not on file   Occupational History    Not on file   Tobacco Use    Smoking status: Former Smoker     Packs/day: 1.00     Years: 40.00     Pack years: 40.00     Start date: 5     Quit date: 2007     Years since quittin.2    Smokeless tobacco: Never Used   Substance and Sexual Activity    Alcohol use: Yes    Drug use: No    Sexual activity: Not on file   Other Topics Concern    Not on file   Social History Narrative    Not on file     Social Determinants of Health     Financial Resource Strain:     Difficulty of Paying Living Expenses: Not on file   Food Insecurity:     Worried About Running Out of Food in the Last Year: Not on file    Iraida of Food in the Last Year: Not on file   Transportation Needs:     Lack of Transportation (Medical): Not on file    Lack of Transportation (Non-Medical):  Not on file   Physical Activity:     Days of Exercise per Week: Not on file    Minutes of Exercise per Session: Not on file   Stress:     Feeling of Stress : Not on file   Social Connections:     Frequency of Communication with Friends and Family: Not on file    Frequency of Social Gatherings with Friends and Family: Not on file    Attends Islam Services: Not on file    Active Member of Clubs or Organizations: Not on file    Attends Club or Organization Meetings: Not on file    Marital Status: Not on file   Intimate Partner Violence:     Fear of Current or Ex-Partner: Not on file    Emotionally Abused: Not on file    Physically Abused: Not on file    Sexually Abused: Not on file   Housing Stability:     Unable to Pay for Housing in the Last Year: Not on file    Number of Places Lived in the Last Year: Not on file    Unstable Housing in the Last Year: Not on file       Family History   Problem Relation Age of Onset    Heart Disease Mother     Kidney Disease Father        Current Outpatient Medications   Medication Sig Dispense Refill    allopurinol (ZYLOPRIM) 300 MG tablet TAKE 1 TABLET DAILY 90 tablet 0    omeprazole (PRILOSEC) 40 MG delayed release capsule TAKE 1 CAPSULE DAILY 90 capsule 2    metoprolol succinate (TOPROL XL) 50 MG extended release tablet TAKE 1 TABLET DAILY 90 tablet 3    pravastatin (PRAVACHOL) 20 MG tablet TAKE 1 TABLET DAILY 90 tablet 3    naproxen (NAPROSYN) 500 MG tablet Take 500 mg by mouth 2 qd      levETIRAcetam (KEPPRA) 250 MG tablet TAKE ONE TABLET BY MOUTH TWO TIMES A  tablet 0    XARELTO 20 MG TABS tablet TAKE ONE TABLET BY MOUTH EVERY DAY  3    colchicine (COLCRYS) 0.6 MG tablet Take 1 tablet by mouth daily as needed for Pain 30 tablet 3     No current facility-administered medications for this visit. Patient has no known allergies. Review of Systems:  General ROS: negative  Psychological ROS: negative  Hematological and Lymphatic ROS: No history of blood clots or bleeding disorder. Respiratory ROS: no cough, shortness of breath, or wheezing  Cardiovascular ROS: no chest pain or dyspnea on exertion  negative  Gastrointestinal ROS: no abdominal pain, change in bowel habits, or black or bloody stools  Genito-Urinary ROS: no dysuria, trouble voiding, or hematuria  Musculoskeletal ROS: negative  Neurological ROS: no TIA or stroke symptoms  Dermatological ROS: negative    VITALS:  Blood pressure (!) 140/90, pulse 72, weight 223 lb (101.2 kg). Body mass index is 30.24 kg/m².     Physical Examination:  General appearance - alert, well appearing, and in no distress  Mental status - alert, oriented to person, place, and time  Neck - Neck is supple, no JVD or carotid bruits. No thyromegaly or adenopathy. Chest - clear to auscultation, no wheezes, rales or rhonchi, symmetric air entry  Heart - normal rate, regular rhythm, normal S1, S2, no murmurs, rubs, clicks or gallops  Abdomen - soft, nontender, nondistended, no masses or organomegaly  Neurological - alert, oriented, normal speech, no focal findings or movement disorder noted  Extremities - peripheral pulses normal, no pedal edema, no clubbing or cyanosis  Skin - normal coloration and turgor, no rashes, no suspicious skin lesions noted    Orders Placed This Encounter   Procedures    EKG 12 Lead     NSR, LBBB. ASSESSMENT:     Diagnosis Orders   1. Paroxysmal atrial fibrillation (HCC)  EKG 12 Lead   2. Pacemaker     3. Primary hypertension     4. Mixed hyperlipidemia     5. Chronic kidney disease, unspecified CKD stage     6. Coronary artery disease involving native coronary artery of native heart without angina pectoris     7. BBB (bundle branch block)           PLAN:       As always, aggressive risk factor modification is strongly recommended. We should adhere to the [address] guidelines for HTN management and the NCEP ATP III guidelines for LDL-C management. Cardiac diet is always recommended with low fat, cholesterol, calories and sodium. Follow up with Dr. Sulema Romberg and neuro. Recheck echocardiogram in the future if warranted by symptoms. Continue with oral anticoagulation. Monitor H/H with PCP    Labs next week by PCP    Check EKG    Continue medications at current doses. Patient was advised and encouraged to check blood pressure at home or at a pharmacy, maintain a logbook, and also call us back if blood pressure are above the target ranges or if it is low. Patient clearly understands and agrees to the instructions. We will need to continue to monitor muscle and liver enzymes, BUN, CR, and electrolytes.     Details of medical condition explained and patient was warned about adverse consequences of uncontrolled medical conditions and possible side effects of prescribed medications. Patient was advised to go to the ER if he starts experiencing adverse effects of the medications. patient was instructed to call us back or go to nearby emergency room immediately if symptoms get worse or do not improve. Thank you for allowing me to participate in the care of your patient, please don't hesitate to contact me if you have any further questions.

## 2021-12-15 ASSESSMENT — LIFESTYLE VARIABLES
HOW OFTEN DURING THE LAST YEAR HAVE YOU FOUND THAT YOU WERE NOT ABLE TO STOP DRINKING ONCE YOU HAD STARTED: NEVER
HAVE YOU OR SOMEONE ELSE BEEN INJURED AS A RESULT OF YOUR DRINKING: 0
HOW MANY STANDARD DRINKS CONTAINING ALCOHOL DO YOU HAVE ON A TYPICAL DAY: 0
AUDIT-C TOTAL SCORE: 3
AUDIT-C TOTAL SCORE: 0
HAS A RELATIVE, FRIEND, DOCTOR, OR ANOTHER HEALTH PROFESSIONAL EXPRESSED CONCERN ABOUT YOUR DRINKING OR SUGGESTED YOU CUT DOWN: 0
HOW OFTEN DURING THE LAST YEAR HAVE YOU HAD A FEELING OF GUILT OR REMORSE AFTER DRINKING: 0
HAS A RELATIVE, FRIEND, DOCTOR, OR ANOTHER HEALTH PROFESSIONAL EXPRESSED CONCERN ABOUT YOUR DRINKING OR SUGGESTED YOU CUT DOWN: NO
HOW OFTEN DURING THE LAST YEAR HAVE YOU BEEN UNABLE TO REMEMBER WHAT HAPPENED THE NIGHT BEFORE BECAUSE YOU HAD BEEN DRINKING: 0
HOW OFTEN DO YOU HAVE A DRINK CONTAINING ALCOHOL: 2
HAVE YOU OR SOMEONE ELSE BEEN INJURED AS A RESULT OF YOUR DRINKING: NO
HOW OFTEN DO YOU HAVE A DRINK CONTAINING ALCOHOL: TWO TO FOUR TIMES A MONTH
HOW OFTEN DURING THE LAST YEAR HAVE YOU NEEDED AN ALCOHOLIC DRINK FIRST THING IN THE MORNING TO GET YOURSELF GOING AFTER A NIGHT OF HEAVY DRINKING: 0
AUDIT TOTAL SCORE: 0
HOW OFTEN DURING THE LAST YEAR HAVE YOU FAILED TO DO WHAT WAS NORMALLY EXPECTED FROM YOU BECAUSE OF DRINKING: NEVER
HOW OFTEN DO YOU HAVE SIX OR MORE DRINKS ON ONE OCCASION: LESS THAN MONTHLY
HOW OFTEN DURING THE LAST YEAR HAVE YOU FAILED TO DO WHAT WAS NORMALLY EXPECTED FROM YOU BECAUSE OF DRINKING: 0
HOW OFTEN DURING THE LAST YEAR HAVE YOU BEEN UNABLE TO REMEMBER WHAT HAPPENED THE NIGHT BEFORE BECAUSE YOU HAD BEEN DRINKING: NEVER
AUDIT TOTAL SCORE: 3
HOW OFTEN DURING THE LAST YEAR HAVE YOU FOUND THAT YOU WERE NOT ABLE TO STOP DRINKING ONCE YOU HAD STARTED: 0
HOW OFTEN DURING THE LAST YEAR HAVE YOU NEEDED AN ALCOHOLIC DRINK FIRST THING IN THE MORNING TO GET YOURSELF GOING AFTER A NIGHT OF HEAVY DRINKING: NEVER
HOW OFTEN DO YOU HAVE SIX OR MORE DRINKS ON ONE OCCASION: 1
HOW MANY STANDARD DRINKS CONTAINING ALCOHOL DO YOU HAVE ON A TYPICAL DAY: ONE OR TWO
HOW OFTEN DURING THE LAST YEAR HAVE YOU HAD A FEELING OF GUILT OR REMORSE AFTER DRINKING: NEVER

## 2021-12-16 ENCOUNTER — OFFICE VISIT (OUTPATIENT)
Dept: FAMILY MEDICINE CLINIC | Age: 71
End: 2021-12-16
Payer: MEDICARE

## 2021-12-16 VITALS
WEIGHT: 226 LBS | TEMPERATURE: 98 F | RESPIRATION RATE: 16 BRPM | DIASTOLIC BLOOD PRESSURE: 84 MMHG | OXYGEN SATURATION: 98 % | SYSTOLIC BLOOD PRESSURE: 132 MMHG | BODY MASS INDEX: 29.95 KG/M2 | HEIGHT: 73 IN | HEART RATE: 70 BPM

## 2021-12-16 VITALS
WEIGHT: 226 LBS | OXYGEN SATURATION: 98 % | SYSTOLIC BLOOD PRESSURE: 132 MMHG | TEMPERATURE: 98 F | HEART RATE: 70 BPM | DIASTOLIC BLOOD PRESSURE: 84 MMHG | HEIGHT: 73 IN | BODY MASS INDEX: 29.95 KG/M2 | RESPIRATION RATE: 16 BRPM

## 2021-12-16 DIAGNOSIS — E78.2 MIXED HYPERLIPIDEMIA: ICD-10-CM

## 2021-12-16 DIAGNOSIS — Z12.5 PROSTATE CANCER SCREENING: ICD-10-CM

## 2021-12-16 DIAGNOSIS — Z00.00 ROUTINE GENERAL MEDICAL EXAMINATION AT A HEALTH CARE FACILITY: Primary | ICD-10-CM

## 2021-12-16 DIAGNOSIS — I10 PRIMARY HYPERTENSION: Primary | ICD-10-CM

## 2021-12-16 PROCEDURE — G0439 PPPS, SUBSEQ VISIT: HCPCS | Performed by: FAMILY MEDICINE

## 2021-12-16 PROCEDURE — 99214 OFFICE O/P EST MOD 30 MIN: CPT | Performed by: FAMILY MEDICINE

## 2021-12-16 PROCEDURE — 4040F PNEUMOC VAC/ADMIN/RCVD: CPT | Performed by: FAMILY MEDICINE

## 2021-12-16 PROCEDURE — 3017F COLORECTAL CA SCREEN DOC REV: CPT | Performed by: FAMILY MEDICINE

## 2021-12-16 PROCEDURE — 1123F ACP DISCUSS/DSCN MKR DOCD: CPT | Performed by: FAMILY MEDICINE

## 2021-12-16 PROCEDURE — G8484 FLU IMMUNIZE NO ADMIN: HCPCS | Performed by: FAMILY MEDICINE

## 2021-12-16 PROCEDURE — G8427 DOCREV CUR MEDS BY ELIG CLIN: HCPCS | Performed by: FAMILY MEDICINE

## 2021-12-16 PROCEDURE — 1036F TOBACCO NON-USER: CPT | Performed by: FAMILY MEDICINE

## 2021-12-16 PROCEDURE — G8417 CALC BMI ABV UP PARAM F/U: HCPCS | Performed by: FAMILY MEDICINE

## 2021-12-16 SDOH — ECONOMIC STABILITY: FOOD INSECURITY: WITHIN THE PAST 12 MONTHS, THE FOOD YOU BOUGHT JUST DIDN'T LAST AND YOU DIDN'T HAVE MONEY TO GET MORE.: NEVER TRUE

## 2021-12-16 SDOH — ECONOMIC STABILITY: FOOD INSECURITY: WITHIN THE PAST 12 MONTHS, YOU WORRIED THAT YOUR FOOD WOULD RUN OUT BEFORE YOU GOT MONEY TO BUY MORE.: NEVER TRUE

## 2021-12-16 ASSESSMENT — PATIENT HEALTH QUESTIONNAIRE - PHQ9
SUM OF ALL RESPONSES TO PHQ QUESTIONS 1-9: 0
SUM OF ALL RESPONSES TO PHQ QUESTIONS 1-9: 0
1. LITTLE INTEREST OR PLEASURE IN DOING THINGS: 0
SUM OF ALL RESPONSES TO PHQ9 QUESTIONS 1 & 2: 0
2. FEELING DOWN, DEPRESSED OR HOPELESS: 0
SUM OF ALL RESPONSES TO PHQ QUESTIONS 1-9: 0

## 2021-12-16 ASSESSMENT — ENCOUNTER SYMPTOMS
SHORTNESS OF BREATH: 0
EYES NEGATIVE: 1
RESPIRATORY NEGATIVE: 1
GASTROINTESTINAL NEGATIVE: 1
ALLERGIC/IMMUNOLOGIC NEGATIVE: 1

## 2021-12-16 ASSESSMENT — SOCIAL DETERMINANTS OF HEALTH (SDOH): HOW HARD IS IT FOR YOU TO PAY FOR THE VERY BASICS LIKE FOOD, HOUSING, MEDICAL CARE, AND HEATING?: NOT HARD AT ALL

## 2021-12-16 NOTE — PROGRESS NOTES
Medicare Annual Wellness Visit  Name: Fercho Marks Date: 2021   MRN: [id number] Sex: Male   Age: 66 y.o. Ethnicity: Non- / Non    : 1950 Race: White (non-)      Michel Packer is here for Medicare AWV (Here for Medicare AWV)    Screenings for behavioral, psychosocial and functional/safety risks, and cognitive dysfunction are all negative except as indicated below. These results, as well as other patient data from the [address] form, are documented in Flowsheets linked to this Encounter. This encounter was performed under my, Kuldip Diaz, direct supervision, 2021. No Known Allergies    Prior to Visit Medications    Medication Sig Taking?  Authorizing Provider   allopurinol (ZYLOPRIM) 300 MG tablet TAKE 1 TABLET DAILY Yes Mali Castro MD   omeprazole (PRILOSEC) 40 MG delayed release capsule TAKE 1 CAPSULE DAILY Yes Mali Castro MD   metoprolol succinate (TOPROL XL) 50 MG extended release tablet TAKE 1 TABLET DAILY Yes Polo Ruiz, DO   pravastatin (PRAVACHOL) 20 MG tablet TAKE 1 TABLET DAILY Yes Polo Ruiz, DO   levETIRAcetam (KEPPRA) 250 MG tablet TAKE ONE TABLET BY MOUTH TWO TIMES A DAY Yes Mali Castro MD   XARELTO 20 MG TABS tablet TAKE ONE TABLET BY MOUTH EVERY DAY Yes Historical Provider, MD   naproxen (NAPROSYN) 500 MG tablet Take 500 mg by mouth 2 qd  Historical Provider, MD   colchicine (COLCRYS) 0.6 MG tablet Take 1 tablet by mouth daily as needed for Pain  Patient not taking: Reported on 2021  Mali Castro MD       Past Medical History:   Diagnosis Date    BBB (bundle branch block)     RIGHT    CAD (coronary artery disease) 2015    Cardiomy[facility]ischemic [facility] 2018    Chronic kidney disease     GERD (gastroesophageal reflux disease)     Hyperlipidemia     Hypertension     Paroxysmal atrial fibrillation ([facility] 2018    Pelvic fracture ([facility]     REMOTE    Retinal artery occlusion 1/16/2015    Seizures ([address]     History of years ago       Past Surgical History:   Procedure Laterality Date    ANGIOPLASTY      ANKLE FRACTURE SURGERY Right     [facility]    [facility]    COLONOSCOPY N/A 10/5/2020    COLONOSCOPY WITH POLYPECTOMY performed by Grace Fried MD at [address]      PELVIC FRACTURE SURGERY      ROTATOR CUFF REPAIR         Family History   Problem Relation Age of Onset    Heart Disease Mother     Kidney Disease Father        CareTeam (Including outside providers/suppliers regularly involved in providing care):   Patient Care Team:  Gertrude Caceres MD as PCP - General (Family Medicine)  Gertrude Caceres MD as PCP - [facility] Empaneled Provider    Wt Readings from Last 3 Encounters:   12/16/21 226 lb (102.5 kg)   12/10/21 223 lb (101.2 kg)   06/18/21 224 lb (101.6 kg)     Vitals:    12/16/21 1006   BP: 132/84   Site: Right Upper Arm   Position: Sitting   Cuff Size: Large Adult   Pulse: 70   Resp: 16   Temp: 98 °F (36.7 °C)   TempSrc: Oral   SpO2: 98%   Weight: 226 lb (102.5 kg)   Height: 6' 1\" (1.854 m)     Body mass index is 29.82 kg/m². Based upon direct observation of the patient, evaluation of cognition reveals recent and remote memory intact. Patient's complete Health Risk Assessment and screening values have been reviewed and are found in Flowsheets. The following problems were reviewed today and where indicated follow up appointments were made and/or referrals ordered. Positive Risk Factor Screenings with Interventions:          General Health and ACP:  General  In general, how would you say your health is?: Very Good  In the past 7 days, have you experienced any of the following?  New or Increased Pain, New or Increased Fatigue, Loneliness, Social Isolation, Stress or Anger?: None of These  Do you get the social and emotional support that you need?: Yes  Do you have a Living Will?:

## 2021-12-16 NOTE — PROGRESS NOTES
Patient is seen in follow up for   Chief Complaint   Patient presents with    1 Year Follow Up     Hypertension  This is a chronic problem. The current episode started more than 1 year ago. The problem is unchanged. The problem is controlled. Pertinent negatives include no chest pain, palpitations or shortness of breath. There are no associated agents to hypertension. Risk factors for coronary artery disease include male gender. Past treatments include beta blockers. The current treatment provides significant improvement. There are no compliance problems.         Past Medical History:   Diagnosis Date    BBB (bundle branch block)     RIGHT    CAD (coronary artery disease) 1/16/2015    Cardiomyopathy, nonischemic ([address] 8/23/2018    Chronic kidney disease     GERD (gastroesophageal reflux disease)     Hyperlipidemia     Hypertension     Paroxysmal atrial fibrillation ([address] 5/18/2018    Pelvic fracture (HCC)     REMOTE    Retinal artery occlusion 1/16/2015    Seizures ([address]     History of years ago     Patient Active Problem List    Diagnosis Date Noted    Adenomatous polyp of ascending colon     Cardiomyopathy ([address] 08/23/2018    Paroxysmal atrial fibrillation ([address] 05/18/2018    Pacemaker 12/22/2015    CAD (coronary artery disease) 01/16/2015    Retinal artery occlusion 01/16/2015    Chronic low back pain 04/14/2014    Adjustment disorder with mixed anxiety and depressed mood 12/13/2012    BBB (bundle branch block)     Hypertension     Chronic kidney disease     Hyperlipidemia     GERD (gastroesophageal reflux disease)      Past Surgical History:   Procedure Laterality Date    ANGIOPLASTY      ANKLE FRACTURE SURGERY Right     [facility]18[facility]2016    [facility]    [facility]ACT [facility]AL [facility]12/2016    [facility]    COLONOSCOPY N/A 10/5/2020    COLONOSCOPY WITH POLYPECTOMY [facility] REPAIR       Family History   Problem Relation Age of Onset    Heart Disease Mother     Kidney Disease Father      Social History     Socioeconomic History    Marital status:      Spouse name: None    Number of children: None    Years of education: None    Highest education level: None   Occupational History    None   Tobacco Use    Smoking status: Former Smoker     Packs/day: 1.00     Years: 40.00     Pack years: 40.00     Start date: 5     Quit date: 2007     Years since quittin.2    Smokeless tobacco: Never Used   Vaping Use    Vaping Use: Never used   Substance and Sexual Activity    Alcohol use: Yes    Drug use: No    Sexual activity: Not Currently   Other Topics Concern    None   Social History Narrative    None     Social Determinants of Health     Financial Resource Strain: Low Risk     Difficulty of Paying Living Expenses: Not hard at all   Food Insecurity: No Food Insecurity    Worried About Running Out of Food in the Last Year: Never true    Iraida of Food in the Last Year: Never true   Transportation Needs:     Lack of Transportation (Medical): Not on file    Lack of Transportation (Non-Medical):  Not on file   Physical Activity:     Days of Exercise per Week: Not on file    Minutes of Exercise per Session: Not on file   Stress:     Feeling of Stress : Not on file   Social Connections:     Frequency of Communication with Friends and Family: Not on file    Frequency of Social Gatherings with Friends and Family: Not on file    Attends Alevism Services: Not on file    Active Member of Clubs or Organizations: Not on file    Attends Club or Organization Meetings: Not on file    Marital Status: Not on file   Intimate Partner Violence:     Fear of Current or Ex-Partner: Not on file    Emotionally Abused: Not on file    Physically Abused: Not on file    Sexually Abused: Not on file   Housing Stability:     Unable to Pay for Housing in the Last Year: Not on file    Number of Places Lived in the Last Year: Not on file    Unstable Housing in the Last Year: Not on file     Current Outpatient Medications   Medication Sig Dispense Refill    allopurinol (ZYLOPRIM) 300 MG tablet TAKE 1 TABLET DAILY 90 tablet 0    omeprazole (PRILOSEC) 40 MG delayed release capsule TAKE 1 CAPSULE DAILY 90 capsule 2    metoprolol succinate (TOPROL XL) 50 MG extended release tablet TAKE 1 TABLET DAILY 90 tablet 3    pravastatin (PRAVACHOL) 20 MG tablet TAKE 1 TABLET DAILY 90 tablet 3    levETIRAcetam (KEPPRA) 250 MG tablet TAKE ONE TABLET BY MOUTH TWO TIMES A  tablet 0    XARELTO 20 MG TABS tablet TAKE ONE TABLET BY MOUTH EVERY DAY  3     No current facility-administered medications for this visit. Current Outpatient Medications on File Prior to Visit   Medication Sig Dispense Refill    allopurinol (ZYLOPRIM) 300 MG tablet TAKE 1 TABLET DAILY 90 tablet 0    omeprazole (PRILOSEC) 40 MG delayed release capsule TAKE 1 CAPSULE DAILY 90 capsule 2    metoprolol succinate (TOPROL XL) 50 MG extended release tablet TAKE 1 TABLET DAILY 90 tablet 3    pravastatin (PRAVACHOL) 20 MG tablet TAKE 1 TABLET DAILY 90 tablet 3    levETIRAcetam (KEPPRA) 250 MG tablet TAKE ONE TABLET BY MOUTH TWO TIMES A  tablet 0    XARELTO 20 MG TABS tablet TAKE ONE TABLET BY MOUTH EVERY DAY  3     No current facility-administered medications on file prior to visit.      No Known Allergies  Health Maintenance   Topic Date Due    Hepatitis C screen  Never done    Lipid screen  06/25/2021    Annual Wellness Visit (AWV)  11/17/2021    Colon cancer screen colonoscopy  10/05/2025    DTaP/Tdap/Td vaccine (3 - Td or Tdap) 11/25/2030    Flu vaccine  Completed    Shingles Vaccine  Completed    Pneumococcal 65+ years Vaccine  Completed    COVID-19 Vaccine  Completed    AAA screen  Completed    Hepatitis A vaccine  Aged Out    Hepatitis B vaccine  Aged Out    Hib vaccine  Aged C/ Hossein Ita 19 Meningococcal (ACWY) vaccine  Aged Out    Low dose CT lung screening  Discontinued       Review of Systems     Review of Systems   Constitutional: Negative for activity change, appetite change, chills, fever and unexpected weight change. HENT: Negative. Eyes: Negative. Respiratory: Negative. Negative for shortness of breath. Cardiovascular: Negative. Negative for chest pain and palpitations. Gastrointestinal: Negative. Endocrine: Negative. Genitourinary: Negative. Musculoskeletal: Negative. Skin: Negative. Allergic/Immunologic: Negative. Neurological: Negative. Hematological: Negative. Psychiatric/Behavioral: Negative. Physical Exam  Vitals:    12/16/21 1027   BP: 132/84   Site: Left Upper Arm   Position: Sitting   Cuff Size: Large Adult   Pulse: 70   Resp: 16   Temp: 98 °F (36.7 °C)   TempSrc: Oral   SpO2: 98%   Weight: 226 lb (102.5 kg)   Height: 6' 1\" (1.854 m)       Physical Exam  Constitutional:       Appearance: He is well-developed. HENT:      Right Ear: External ear normal.      Left Ear: External ear normal.   Eyes:      Conjunctiva/sclera: Conjunctivae normal.      Pupils: Pupils are equal, round, and reactive to light. Neck:      Thyroid: No thyromegaly. Cardiovascular:      Rate and Rhythm: Normal rate and regular rhythm. Heart sounds: Normal heart sounds. No murmur heard. No friction rub. No gallop. Pulmonary:      Effort: Pulmonary effort is normal. No respiratory distress. Breath sounds: Normal breath sounds. No wheezing. Abdominal:      General: Bowel sounds are normal. There is no distension. Palpations: Abdomen is soft. There is no mass. Tenderness: There is no abdominal tenderness. There is no guarding or rebound. Hernia: No hernia is present. Genitourinary:     Penis: Normal.    Musculoskeletal:         General: No tenderness. Normal range of motion. Cervical back: Normal range of motion and neck supple. Lymphadenopathy:      Cervical: No cervical adenopathy. Skin:     General: Skin is warm and dry. Neurological:      Mental Status: He is alert and oriented to person, place, and time. Cranial Nerves: No cranial nerve deficit. Coordination: Coordination normal.         Assessment   Diagnosis Orders   1. Primary hypertension  Comprehensive Metabolic Panel    CBC Auto Differential   2. Mixed hyperlipidemia  Lipid, Fasting    CBC Auto Differential   3. Prostate cancer screening  PSA screening     Problem List     Hypertension - Primary    Relevant Medications    metoprolol succinate (TOPROL XL) 50 MG extended release tablet    Other Relevant Orders    Comprehensive Metabolic Panel    CBC Auto Differential    Hyperlipidemia    Relevant Medications    XARELTO 20 MG TABS tablet    metoprolol succinate (TOPROL XL) 50 MG extended release tablet    pravastatin (PRAVACHOL) 20 MG tablet    Other Relevant Orders    Lipid, Fasting    CBC Auto Differential          Plan  Orders Placed This Encounter   Procedures    Comprehensive Metabolic Panel     Standing Status:   Future     Standing Expiration Date:   12/16/2022    Lipid, Fasting     Standing Status:   Future     Standing Expiration Date:   12/16/2022    CBC Auto Differential     Standing Status:   Future     Standing Expiration Date:   12/16/2022    PSA screening     Standing Status:   Future     Standing Expiration Date:   12/16/2022     No orders of the defined types were placed in this encounter. Return in about 1 year (around 12/16/2022).   Aixa Anthony MD

## 2021-12-16 NOTE — PATIENT INSTRUCTIONS
Personalized Preventive Plan for Naren Langley - 12/16/2021  Medicare offers a range of preventive health benefits. Some of the tests and screenings are paid in full while other may be subject to a deductible, co-insurance, and/or copay. Some of these benefits include a comprehensive review of your medical history including lifestyle, illnesses that may run in your family, and various assessments and screenings as appropriate. After reviewing your medical record and screening and assessments performed today your provider may have ordered immunizations, labs, imaging, and/or referrals for you. A list of these orders (if applicable) as well as your Preventive Care list are included within your After Visit Summary for your review. Other Preventive Recommendations:    · A preventive eye exam performed by an eye specialist is recommended every 1-2 years to screen for glaucoma; cataracts, macular degeneration, and other eye disorders. · A preventive dental visit is recommended every 6 months. · Try to get at least 150 minutes of exercise per week or 10,000 steps per day on a pedometer . · Order or download the FREE \"Exercise & Physical Activity: Your Everyday Guide\" from [employer]. Call 4-692.955.4960 or search [employer] online. · You need 3798-5758 mg of calcium and 4624-8260 IU of vitamin D per day. It is possible to meet your calcium requirement with diet alone, but a vitamin D supplement is usually necessary to meet this goal.  · When exposed to the sun, use a sunscreen that protects against both UVA and UVB radiation with an SPF of 30 or greater. Reapply every 2 to 3 hours or after sweating, drying off with a towel, or swimming. · Always wear a seat belt when traveling in a car. Always wear a helmet when riding a bicycle or motorcycle. Heart-Healthy Diet   Sodium, Fat, and Cholesterol Controlled Diet       What Is a Heart Healthy Diet?    A heart-healthy diet is one that limits sodium , certain types of fat , and cholesterol . This type of diet is recommended for:   People with any form of cardiovascular disease (eg, coronary heart disease , peripheral vascular disease , previous heart attack , previous stroke )   People with risk factors for cardiovascular disease, such as high blood pressure , high cholesterol , or diabetes   Anyone who wants to lower their risk of developing cardiovascular disease   Sodium    Sodium is a mineral found in many foods. In general, most people consume much more sodium than they need. Diets high in sodium can increase blood pressure and lead to edema (water retention). On a heart-healthy diet, you should consume no more than 2,300 mg (milligrams) of sodium per dayabout the amount in one teaspoon of table salt. The foods highest in sodium include table salt (about 50% sodium), processed foods, convenience foods, and preserved foods. Cholesterol    Cholesterol is a fat-like, waxy substance in your blood. Our bodies make some cholesterol. It is also found in animal products, with the highest amounts in fatty meat, egg yolks, whole milk, cheese, shellfish, and organ meats. On a heart-healthy diet, you should limit your cholesterol intake to less than 200 mg per day. It is normal and important to have some cholesterol in your bloodstream. But too much cholesterol can cause plaque to build up within your arteries, which can eventually lead to a heart attack or stroke. The two types of cholesterol that are most commonly referred to are:   Low-density lipoprotein (LDL) cholesterol  Also known as bad cholesterol, this is the cholesterol that tends to build up along your arteries. Bad cholesterol levels are increased by eating fats that are saturated or hydrogenated. Optimal level of this cholesterol is less than 100. Over 130 starts to get risky for heart disease.    High-density lipoprotein (HDL) cholesterol  Also known as good cholesterol, this type of cholesterol actually carries cholesterol away from your arteries and may, therefore, help lower your risk of having a heart attack. You want this level to be high (ideally greater than 60). It is a risk to have a level less than 40. You can raise this good cholesterol by eating olive oil, canola oil, avocados, or nuts. Exercise raises this level, too. Fat    Fat is calorie dense and packs a lot of calories into a small amount of food. Even though fats should be limited due to their high calorie content, not all fats are bad. In fact, some fats are quite healthful. Fat can be broken down into four main types. The good-for-you fats are:   Monounsaturated fat  found in oils such as olive and canola, avocados, and nuts and natural nut butters; can decrease cholesterol levels, while keeping levels of HDL cholesterol high   Polyunsaturated fat  found in oils such as safflower, sunflower, soybean, corn, and sesame; can decrease total cholesterol and LDL cholesterol   Omega-3 fatty acids  particularly those found in fatty fish (such as salmon, trout, tuna, mackerel, herring, and sardines); can decrease risk of arrhythmias, decrease triglyceride levels, and slightly lower blood pressure   The fats that you want to limit are:   Saturated fat  found in animal products, many fast foods, and a few vegetables; increases total blood cholesterol, including LDL levels   Animal fats that are saturated include: butter, lard, whole-milk dairy products, meat fat, and poultry skin   Vegetable fats that are saturated include: hydrogenated shortening, palm oil, coconut oil, cocoa butter   Hydrogenated or trans fat  found in margarine and vegetable shortening, most shelf stable snack foods, and fried foods; increases LDL and decreases HDL     It is generally recommended that you limit your total fat for the day to less than 30% of your total calories.  If you follow an 1800-calorie heart healthy diet, for example, this would mean 60 grams of fat or less per day. Saturated fat and trans fat in your diet raises your blood cholesterol the most, much more than dietary cholesterol does. For this reason, on a heart-healthy diet, less than 7% of your calories should come from saturated fat and ideally 0% from trans fat. On an 1800-calorie diet, this translates into less than 14 grams of saturated fat per day, leaving 46 grams of fat to come from mono- and polyunsaturated fats.    Food Choices on a Heart Healthy Diet   Food Category   Foods Recommended   Foods to Avoid   Grains   Breads and rolls without salted tops Most dry and cooked cereals Unsalted crackers and breadsticks Low-sodium or homemade breadcrumbs or stuffing All rice and pastas   Breads, rolls, and crackers with salted tops High-fat baked goods (eg, muffins, donuts, pastries) Quick breads, self-rising flour, and biscuit mixes Regular bread crumbs Instant hot cereals Commercially prepared rice, pasta, or stuffing mixes   Vegetables   Most fresh, frozen, and low-sodium canned vegetables Low-sodium and salt-free vegetable juices Canned vegetables if unsalted or rinsed   Regular canned vegetables and juices, including sauerkraut and pickled vegetables Frozen vegetables with sauces Commercially prepared potato and vegetable mixes   Fruits   Most fresh, frozen, and canned fruits All fruit juices   Fruits processed with salt or sodium   Milk   Nonfat or low-fat (1%) milk Nonfat or low-fat yogurt Cottage cheese, low-fat ricotta, cheeses labeled as low-fat and low-sodium   Whole milk Reduced-fat (2%) milk Malted and chocolate milk Full fat yogurt Most cheeses (unless low-fat and low salt) Buttermilk (no more than 1 cup per week)   Meats and Beans   Lean cuts of fresh or frozen beef, veal, lamb, or pork (look for the word loin) Fresh or frozen poultry without the skin Fresh or frozen fish and some shellfish Egg whites and egg substitutes (Limit whole eggs to three per week) Tofu Nuts or seeds (unsalted, dry-roasted), low-sodium peanut butter Dried peas, beans, and lentils   Any smoked, cured, salted, or canned meat, fish, or poultry (including ordonez, chipped beef, cold cuts, hot dogs, sausages, sardines, and anchovies) Poultry skins Breaded and/or fried fish or meats Canned peas, beans, and lentils Salted nuts   Fats and Oils   Olive oil and canola oil Low-sodium, low-fat salad dressings and mayonnaise   Butter, margarine, coconut and palm oils, ordonez fat   Snacks, Sweets, and Condiments   Low-sodium or unsalted versions of broths, soups, soy sauce, and condiments Pepper, herbs, and spices; vinegar, lemon, or lime juice Low-fat frozen desserts (yogurt, sherbet, fruit bars) Sugar, cocoa powder, honey, syrup, jam, and preserves Low-fat, trans-fat free cookies, cakes, and pies Jt and animal crackers, fig bars, vianca snaps   High-fat desserts Broth, soups, gravies, and sauces, made from instant mixes or other high-sodium ingredients Salted snack foods Canned olives Meat tenderizers, seasoning salt, and most flavored vinegars   Beverages   Low-sodium carbonated beverages Tea and coffee in moderation Soy milk   Commercially softened water   Suggestions   Make whole grains, fruits, and vegetables the base of your diet. Choose heart-healthy fats such as canola, olive, and flaxseed oil, and foods high in heart-healthy fats, such as nuts, seeds, soybeans, tofu, and fish. Eat fish at least twice per week; the fish highest in omega-3 fatty acids and lowest in mercury include salmon, herring, mackerel, sardines, and canned chunk light tuna. If you eat fish less than twice per week or have high triglycerides, talk to your doctor about taking fish oil supplements. Read food labels.    For products low in fat and cholesterol, look for fat free, low-fat, cholesterol free, saturated fat free, and trans fat freeAlso scan the Nutrition Facts Label, which lists saturated fat, trans fat, and cholesterol amounts. For products low in sodium, look for sodium free, very low sodium, low sodium, no added salt, and unsalted   Skip the salt when cooking or at the table; if food needs more flavor, get creative and try out different herbs and spices. Garlic and onion also add substantial flavor to foods. Trim any visible fat off meat and poultry before cooking, and drain the fat off after barnett. Use cooking methods that require little or no added fat, such as grilling, boiling, baking, poaching, broiling, roasting, steaming, stir-frying, and sauting. Avoid fast food and convenience food. They tend to be high in saturated and trans fat and have a lot of added salt. Talk to a registered dietitian for individualized diet advice. Last Reviewed: March 2011 Jen Echevarria MS, MPH, RD   Updated: 3/29/2011   ·     Keep Your Memory [place]       Many factors can affect your ability to remembera hectic lifestyle, aging, stress, chronic disease, and certain medicines. But, there are steps you can take to sharpen your mind and help preserve your memory. Challenge Your Brain   Regularly challenging your mind may help keeps it in top shape. Good mental exercises include:   Crossword puzzlesUse a dictionary if you need it; you will learn more that way. Brainteasers Try some! Crafts, such as wood working and sewing   Hobbies, such as gardening and building model airplanes   SocializingVisit old friends or join groups to meet new ones. Reading   Learning a new language   Taking a class, whether it be art history or tan chi   TravelingExperience the food, history, and culture of your destination   Learning to use a computer   Going to museums, the theater, or thought-provoking movies   Changing things in your daily life, such as reversing your pattern in the grocery store or brushing your teeth using your nondominant hand   Use Memory Aids   There is no need to remember every detail on your own.  These memory aids can help:   Calendars and day planners   Electronic organizers to store all sorts of helpful informationThese devices can \"beep\" to remind you of appointments. A book of days to record birthdays, anniversaries, and other occasions that occur on the same date every year   Detailed \"to-do\" lists and strategically placed sticky notes   Quick \"study\" sessionsBefore a gathering, review who will be there so their names will be fresh in your mind. Establish routinesFor example, keep your keys, wallet, and umbrella in the same place all the time or take medicine with your 8:00 AM glass of juice   Live a Healthy Life   Many actions that will keep your body strong will do the same for your mind. For example:   Talk to Your Doctor About Herbs and Supplements    Malnutrition and vitamin deficiencies can impair your mental function. For example, vitamin B12 deficiency can cause a range of symptoms, including confusion. But, what if your nutritional needs are being met? Can herbs and supplements still offer a benefit? Researchers have investigated a range of natural remedies, such as ginkgo , ginseng , and the supplement phosphatidylserine (PS). So far, though, the evidence is inconsistent as to whether these products can improve memory or thinking. If you are interested in taking herbs and supplements, talk to your doctor first because they may interact with other medicines that you are taking. Exercise Regularly    Among the many benefits of regular exercise are increased blood flow to the brain and decreased risk of certain diseases that can interfere with memory function. One study found that even moderate exercise has a beneficial effect. Examples of \"moderate\" exercise include:   Playing 18 holes of golf once a week, without a cart   Playing tennis twice a week   Walking one mile per day   Manage Stress    It can be tough to remember what is important when your mind is cluttered.  Make time for relaxation. Choose activities that calm you down, and make it routine. Manage Chronic Conditions    Side effects of high blood pressure , diabetes, and heart disease can interfere with mental function. Many of the lifestyle steps discussed here can help manage these conditions. Strive to eat a healthy diet, exercise regularly, get stress under control, and follow your doctor's advice for your condition. Minimize Medications    Talk to your doctor about the medicines that you take. Some may be unnecessary. Also, healthy lifestyle habits may lower the need for certain drugs. Last Reviewed: April 2010 Jonathon Yanes MD   Updated: 4/13/2010   ·     [address]       As we get older, changes in balance, gait, strength, vision, hearing, and cognition make even the most youthful senior more prone to accidents. Falls are one of the leading health risks for older people. This increased risk of falling is related to:   Aging process (eg, decreased muscle strength, slowed reflexes)   Higher incidence of chronic health problems (eg, arthritis, diabetes) that may limit mobility, agility or sensory awareness   Side effects of medicine (eg, dizziness, blurred vision)especially medicines like prescription pain medicines and drugs used to treat mental health conditions   Depending on the brittleness of your bones, the consequences of a fall can be serious and long lasting. Home Life   Research by the Association of Aging [facility]) shows that some home accidents among older adults can be prevented by making simple lifestyle changes and basic modifications and repairs to the home environment. Here are some lifestyle changes that experts recommend:   Have your hearing and vision checked regularly. Be sure to wear prescription glasses that are right for you. Speak to your doctor or pharmacist about the possible side effects of your medicines. A number of medicines can cause dizziness.    If you have problems with wood floors can be particularly slippery. Stairs should always have handrails and be carpeted or fitted with a non-skid tread. Is your telephone easily reachable. Is the cord safely tucked away? Room by Room   According to the Association of Aging, bathrooms and sandy are the two most potentially hazardous rooms in your home. In the Kitchen    Be sure your stove is in proper working order and always make sure burners and the oven are off before you go out or go to sleep. Keep pots on the back burners, turn handles away from the front of the stove, and keep stove clean and free of grease build-up. Kitchen ventilation systems and range exhausts should be working properly. Keep flammable objects such as towels and pot holders away from the cooking area except when in use. Make sure kitchen curtains are tied back. Move cords and appliances away from the sink and hot surfaces. If extension cords are needed, install wiring guides so they do not hang over the sink, range, or working areas. Look for coffee pots, kettles and toaster ovens with automatic shut-offs. Keep a mop handy in the kitchen so you can wipe up spills instantly. You should also have a small fire extinguisher. Arrange your kitchen with frequently used items on lower shelves to avoid the need to stand on a stepstool to reach them. Make sure countertops are well-lit to avoid injuries while cutting and preparing food. In the Bathroom    Use a non-slip mat or decals in the tub and shower, since wet, soapy tile or porcelain surfaces are extremely slippery. Make sure bathroom rugs are non-skid or tape them firmly to the floor. Bathtubs should have at least one, preferably two, grab bars, firmly attached to structural supports in the wall. (Do not use built-in soap holders or glass shower doors as grab bars.)    Tub seats fitted with non-slip material on the legs allow you to wash sitting down.  For people with limited mobility, bathtub transfer benches allow you to slide safely into the tub. Raised toilet seats and toilet safety rails are helpful for those with knee or hip problems. In the [facility]    Make sure you use a nightlight and that the area around your bed is clear of potential obstacles. Be careful with electric blankets and never go to sleep with a heating pad, which can cause serious burns even if on a low setting. Use fire-resistant mattress covers and pillows, and NEVER smoke in bed. Keep a phone next to the bed that is programmed to dial 911 at the push of a button. If you have a chronic condition, you may want to sign on with an automatic call-in service. Typically the system includes a small pendant that connects directly to an emergency medical voice-response system. You should also make arrangements to stay in contact with someonefriend, neighbor, family memberon a regular schedule. Fire Prevention   According to the [employer] (Smoke Alarms for Every) [address] [place] [address], senior citizens are one of the two highest risk groups for death and serious injuries due to residential fires. When cooking, wear short-sleeved items, never a bulky long-sleeved robe. The [facility]'s Safety Checklist for Older Consumers emphasizes the importance of checking basements, garages, workshops and storage areas for fire hazards, such as volatile liquids, piles of old rags or clothing and overloaded circuits. Never smoke in bed or when lying down on a couch or recliner chair. Small portable electric or kerosene heaters are responsible for many home fires and should be used cautiously if at all. If you do use one, be sure to keep them away from flammable materials. In case of fire, make sure you have a pre-established emergency exit plan. Have a professional check your fireplace and other fuel-burning appliances yearly.     Helping Hands   Baby boomers entering the burden years will continue to see the development of new products to help older adults live safely and independently in spite of age-related changes. Making Life More Livable  , by Luly Bhat, lists over 1,000 products for \"living well in the mature years,\" such as bathing and mobility aids, household security devices, ergonomically designed knives and peelers, and faucet valves and knobs for temperature control. Medical supply stores and organizations are good sources of information about products that improve your quality of life and insure your safety.      Last Reviewed: November 2009 Dyanna Cranker, MD   Updated: 3/7/2011     ·

## 2021-12-17 DIAGNOSIS — E78.2 MIXED HYPERLIPIDEMIA: ICD-10-CM

## 2021-12-17 DIAGNOSIS — I10 PRIMARY HYPERTENSION: ICD-10-CM

## 2021-12-17 DIAGNOSIS — Z12.5 PROSTATE CANCER SCREENING: ICD-10-CM

## 2021-12-17 LAB
ALBUMIN SERPL-MCNC: 4.3 G/DL (ref 3.5–4.6)
ALP BLD-CCNC: 40 U/L (ref 35–104)
ALT SERPL-CCNC: 20 U/L (ref 0–41)
ANION GAP SERPL CALCULATED.3IONS-SCNC: 14 MEQ/L (ref 9–15)
AST SERPL-CCNC: 22 U/L (ref 0–40)
BASOPHILS ABSOLUTE: 0 K/UL (ref 0–0.2)
BASOPHILS RELATIVE PERCENT: 0.3 %
BILIRUB SERPL-MCNC: 0.5 MG/DL (ref 0.2–0.7)
BUN BLDV-MCNC: 26 MG/DL (ref 8–23)
CALCIUM SERPL-MCNC: 9.4 MG/DL (ref 8.5–9.9)
CHLORIDE BLD-SCNC: 103 MEQ/L (ref 95–107)
CHOLESTEROL, FASTING: 122 MG/DL (ref 0–199)
CO2: 23 MEQ/L (ref 20–31)
CREAT SERPL-MCNC: 1.19 MG/DL (ref 0.7–1.2)
EOSINOPHILS ABSOLUTE: 0.1 K/UL (ref 0–0.7)
EOSINOPHILS RELATIVE PERCENT: 1.9 %
GFR AFRICAN AMERICAN: >60
GFR NON-AFRICAN AMERICAN: 60
GLOBULIN: 2.6 G/DL (ref 2.3–3.5)
GLUCOSE BLD-MCNC: 79 MG/DL (ref 70–99)
HCT VFR BLD CALC: 37.6 % (ref 42–52)
HDLC SERPL-MCNC: 40 MG/DL (ref 40–59)
HEMOGLOBIN: 12.6 G/DL (ref 14–18)
LDL CHOLESTEROL CALCULATED: 69 MG/DL (ref 0–129)
LYMPHOCYTES ABSOLUTE: 1.3 K/UL (ref 1–4.8)
LYMPHOCYTES RELATIVE PERCENT: 24.7 %
MCH RBC QN AUTO: 33.6 PG (ref 27–31.3)
MCHC RBC AUTO-ENTMCNC: 33.5 % (ref 33–37)
MCV RBC AUTO: 100.4 FL (ref 80–100)
MONOCYTES ABSOLUTE: 0.5 K/UL (ref 0.2–0.8)
MONOCYTES RELATIVE PERCENT: 10.5 %
NEUTROPHILS ABSOLUTE: 3.2 K/UL (ref 1.4–6.5)
NEUTROPHILS RELATIVE PERCENT: 62.6 %
PDW BLD-RTO: 14.1 % (ref 11.5–14.5)
PLATELET # BLD: 165 K/UL (ref 130–400)
POTASSIUM SERPL-SCNC: 4.5 MEQ/L (ref 3.4–4.9)
PROSTATE SPECIFIC ANTIGEN: 1.76 NG/ML (ref 0–4)
RBC # BLD: 3.75 M/UL (ref 4.7–6.1)
SODIUM BLD-SCNC: 140 MEQ/L (ref 135–144)
TOTAL PROTEIN: 6.9 G/DL (ref 6.3–8)
TRIGLYCERIDE, FASTING: 67 MG/DL (ref 0–150)
WBC # BLD: 5.1 K/UL (ref 4.8–10.8)

## 2021-12-19 ENCOUNTER — PATIENT MESSAGE (OUTPATIENT)
Dept: FAMILY MEDICINE CLINIC | Age: 71
End: 2021-12-19

## 2021-12-19 DIAGNOSIS — J40 BRONCHITIS: Primary | ICD-10-CM

## 2021-12-20 RX ORDER — AZITHROMYCIN 250 MG/1
250 TABLET, FILM COATED ORAL SEE ADMIN INSTRUCTIONS
Qty: 6 TABLET | Refills: 0 | Status: SHIPPED | OUTPATIENT
Start: 2021-12-20 | End: 2021-12-25

## 2022-01-11 DIAGNOSIS — M10.072 ACUTE IDIOPATHIC GOUT OF LEFT FOOT: ICD-10-CM

## 2022-01-11 RX ORDER — ALLOPURINOL 300 MG/1
TABLET ORAL
Qty: 90 TABLET | Refills: 0 | Status: SHIPPED | OUTPATIENT
Start: 2022-01-11 | End: 2022-02-07

## 2022-01-11 NOTE — TELEPHONE ENCOUNTER
Future Appointments    Encounter Information    Provider Department Appt Notes   6/10/2022 Smiley William DO [facility] Cardiology 6 month   12/16/2022 Jessica Mendoza MD [facility] AT [facility] annual       Recent Visits    12/16/2021 Primary hypertension   [facility] [place] MD Yoshi

## 2022-01-17 ENCOUNTER — VIRTUAL VISIT (OUTPATIENT)
Dept: FAMILY MEDICINE CLINIC | Age: 72
End: 2022-01-17
Payer: MEDICARE

## 2022-01-17 DIAGNOSIS — I42.9 CARDIOMYOPATHY, UNSPECIFIED TYPE (HCC): ICD-10-CM

## 2022-01-17 DIAGNOSIS — J40 BRONCHITIS: Primary | ICD-10-CM

## 2022-01-17 DIAGNOSIS — R09.89 CHEST CONGESTION: ICD-10-CM

## 2022-01-17 DIAGNOSIS — I48.0 PAROXYSMAL ATRIAL FIBRILLATION (HCC): ICD-10-CM

## 2022-01-17 LAB
INFLUENZA A ANTIBODY: NORMAL
INFLUENZA B ANTIBODY: NORMAL
Lab: NORMAL
PERFORMING INSTRUMENT: NORMAL
QC PASS/FAIL: NORMAL
SARS-COV-2, POC: NORMAL

## 2022-01-17 PROCEDURE — 87804 INFLUENZA ASSAY W/OPTIC: CPT | Performed by: PHYSICIAN ASSISTANT

## 2022-01-17 PROCEDURE — 87426 SARSCOV CORONAVIRUS AG IA: CPT | Performed by: PHYSICIAN ASSISTANT

## 2022-01-17 PROCEDURE — 99441 PR PHYS/QHP TELEPHONE EVALUATION 5-10 MIN: CPT | Performed by: PHYSICIAN ASSISTANT

## 2022-01-17 RX ORDER — AMOXICILLIN 875 MG/1
875 TABLET, COATED ORAL 2 TIMES DAILY
Qty: 20 TABLET | Refills: 0 | Status: SHIPPED | OUTPATIENT
Start: 2022-01-17 | End: 2022-01-27

## 2022-01-17 ASSESSMENT — ENCOUNTER SYMPTOMS
ABDOMINAL PAIN: 0
TROUBLE SWALLOWING: 0
COUGH: 1
BACK PAIN: 0
SHORTNESS OF BREATH: 0
VOMITING: 0
CHEST TIGHTNESS: 0
SINUS PRESSURE: 0
DIARRHEA: 0

## 2022-01-17 ASSESSMENT — PATIENT HEALTH QUESTIONNAIRE - PHQ9
2. FEELING DOWN, DEPRESSED OR HOPELESS: 0
SUM OF ALL RESPONSES TO PHQ QUESTIONS 1-9: 0
SUM OF ALL RESPONSES TO PHQ9 QUESTIONS 1 & 2: 0
1. LITTLE INTEREST OR PLEASURE IN DOING THINGS: 0

## 2022-01-17 NOTE — PROGRESS NOTES
TELEHEALTH EVALUATION -- Audio/Visual (During [facility]-08 public health emergency)    -   Freddie Maloney is a 66 y.o. male being evaluated by a Virtual Visit (video visit) encounter to address concerns as mentioned above. A caregiver was present when appropriate. Due to this being a TeleHealth encounter (During [facility] public health emergency), evaluation of the following organ systems was limited: Vitals/Constitutional/EENT/Resp/CV/GI//MS/Neuro/Skin/Heme-Lymph-Imm. Pursuant to the emergency declaration under the [address] [place] [address], [address] [place] [address] authority and the [employer] Act, this Virtual Visit was conducted with patient's (and/or legal guardian's) consent, to reduce the patient's risk of exposure to COVID-19 and provide necessary medical care. The patient (and/or legal guardian) has also been advised to contact this office for worsening conditions or problems, and seek emergency medical treatment and/or call 911 if deemed necessary. Patient was contacted and agreed to proceed with a virtual visit via Telephone Visit  The risks and benefits of converting to a virtual visit were discussed in light of the current infectious disease epidemic. Patient also understood that insurance coverage and co-pays are up to their individual insurance plans. Patient was located at their home. Provider was located at their office. 2022  Freddie Maloney (:  1950) has requested an audio/video evaluation for the following concern(s):    HPI  66year old male who presents with a productive cough of yellow sputum for the past month. Chest congestion. Has tried otc remedies with minimal relief      Review of Systems   Constitutional: Negative for activity change, appetite change, chills, fever and unexpected weight change. HENT: Positive for congestion.  Negative for drooling, ear pain, nosebleeds, sinus pressure and trouble swallowing. Respiratory: Positive for cough. Negative for chest tightness and shortness of breath. Cardiovascular: Negative for chest pain and leg swelling. Gastrointestinal: Negative for abdominal pain, diarrhea and vomiting. Endocrine: Negative for polydipsia and polyphagia. Genitourinary: Negative for dysuria, flank pain and frequency. Musculoskeletal: Negative for back pain and myalgias. Skin: Negative for pallor and rash. Neurological: Negative for syncope, weakness and headaches. Hematological: Does not bruise/bleed easily. Psychiatric/Behavioral: Negative for agitation, behavioral problems and confusion. All other systems reviewed and are negative. Prior to Visit Medications    Medication Sig Taking?  Authorizing Provider   amoxicillin (AMOXIL) 875 MG tablet Take 1 tablet by mouth 2 times daily for 10 days Yes RACIEL Joy   allopurinol (ZYLOPRIM) 300 MG tablet TAKE 1 TABLET DAILY  Leonel Nathan APRN - CNP   omeprazole (PRILOSEC) 40 MG delayed release capsule TAKE 1 CAPSULE DAILY  Juice Gonsalves MD   metoprolol succinate (TOPROL XL) 50 MG extended release tablet TAKE 1 TABLET DAILY  Polo J Holiday, DO   pravastatin (PRAVACHOL) 20 MG tablet TAKE 1 TABLET DAILY  Polo J Holiday, DO   levETIRAcetam (KEPPRA) 250 MG tablet TAKE ONE TABLET BY MOUTH TWO TIMES A DAY  Juice Gonsalves MD   XARELTO 20 MG TABS tablet TAKE ONE TABLET BY MOUTH EVERY DAY  Historical Provider, MD       Past Medical History:   Diagnosis Date    BBB (bundle branch block)     RIGHT    CAD (coronary artery disease) 1/16/2015    Cardiomyopathy, nonischemic ([address] 8/23/2018    Chronic kidney disease     GERD (gastroesophageal reflux disease)     Hyperlipidemia     Hypertension     Paroxysmal atrial fibrillation ([address] 5/18/2018    Pelvic fracture ([address]     REMOTE    Retinal artery occlusion 1/16/2015    Seizures ([facility]     History of years ago     Past Surgical History:   Procedure Laterality Date    ANGIOPLASTY      ANKLE FRACTURE SURGERY Right     [facility]    [facility]AL Right [facility]    [facility]    COLONOSCOPY N/A 10/5/2020    COLONOSCOPY WITH POLYPECTOMY performed by Mike Reese MD at [address] [place] [address]      ROTATOR CUFF REPAIR       Social History     Socioeconomic History    Marital status:      Spouse name: Not on file    Number of children: Not on file    Years of education: Not on file    Highest education level: Not on file   Occupational History    Not on file   Tobacco Use    Smoking status: Former Smoker     Packs/day: 1.00     Years: 40.00     Pack years: 40.00     Start date: 5     Quit date: 2007     Years since quittin.3    Smokeless tobacco: Never Used   Vaping Use    Vaping Use: Never used   Substance and Sexual Activity    Alcohol use: Yes    Drug use: No    Sexual activity: Not Currently   Other Topics Concern    Not on file   Social History Narrative    Not on file     Social Determinants of Health     Financial Resource Strain: Low Risk     Difficulty of Paying Living Expenses: Not hard at all   Food Insecurity: No Food Insecurity    Worried About Running Out of Food in the Last Year: Never true    [address] in the Last Year: Never true   Transportation Needs:     Lack of Transportation (Medical): Not on file    Lack of Transportation (Non-Medical):  Not on file   Physical Activity:     Days of Exercise per Week: Not on file    Minutes of Exercise per Session: Not on file   Stress:     Feeling of Stress : Not on file   Social Connections:     Frequency of Communication with Friends and Family: Not on file    Frequency of Social Gatherings with Friends and Family: Not on file    Attends Voodoo Services: Not on file    Active Member of Clubs or Organizations: Not on file    Attends Club or Organization Meetings: Not on file    Marital Status: Not on file   Intimate Partner Violence:     Fear of Current or Ex-Partner: Not on file    Emotionally Abused: Not on file    Physically Abused: Not on file    Sexually Abused: Not on file   Housing Stability:     Unable to Pay for Housing in the Last Year: Not on file    Number of [place] in the Last Year: Not on file    Unstable Housing in the Last Year: Not on file     Family History   Problem Relation Age of Onset    Heart Disease Mother     Kidney Disease Father      No Known Allergies    PMH, Surgical Hx, Family Hx, and Social Hx reviewed and updated. Health Maintenance reviewed. PHYSICAL EXAMINATION:  \"[x]\" Indicates a positive item  \"[]\" Indicates a negative item    Vital Signs: (As obtained by patient/caregiver or practitioner observation)    Blood pressure-  Heart rate-    Respiratory rate-    Temperature-  Pulse oximetry-     Constitutional: [x] Appears well-developed and well-nourished [x] No apparent distress      [] Abnormal-   Mental status  [x] Alert and awake  [x] Oriented to person/place/time [x]Able to follow commands      Eyes:  EOM    []  Normal  [] Abnormal-  Sclera  [x]  Normal  [] Abnormal -         Discharge [x]  None visible  [] Abnormal -    HENT:   [x] Normocephalic, atraumatic.   [] Abnormal   [x] Mouth/Throat: Mucous membranes are moist.     External Ears [x] Normal  [] Abnormal-     Neck: [x] No visualized mass     Pulmonary/Chest: [x] Respiratory effort normal.  [x] No visualized signs of difficulty breathing or respiratory distress        [] Abnormal-      Musculoskeletal:   [x] Normal gait with no signs of ataxia         [x] Normal range of motion of neck        [] Abnormal-       Neurological:       [x] No Facial Asymmetry (Cranial nerve 7 motor function) (limited exam to video visit)          [x] No gaze palsy        [] Abnormal-         Skin:        [x] No significant exanthematous lesions or discoloration noted on facial skin         [] Abnormal-            Psychiatric:       [x] Normal Affect [x] No Hallucinations        [] Abnormal-     Other pertinent observable physical exam findings-   Results for orders placed or performed in visit on 01/17/22   POCT COVID-19, Antigen   Result Value Ref Range    SARS-COV-2, POC Not-Detected Not Detected    Lot Number [id number]     QC Pass/Fail Pass     Performing Instrument BD Veritor    POCT Influenza A/B   Result Value Ref Range    Influenza A Ab -     Influenza B Ab -        ASSESSMENT/PLAN:  Assessment & Plan   Andria Shirley was seen today for congestion and cough. Diagnoses and all orders for this visit:    Bronchitis  -     XR CHEST STANDARD (2 VW); Future  -     amoxicillin (AMOXIL) 875 MG tablet; Take 1 tablet by mouth 2 times daily for 10 days    Chest congestion  -     POCT COVID-19, Antigen  -     POCT Influenza A/B  -     XR CHEST STANDARD (2 VW); Future  -     amoxicillin (AMOXIL) 875 MG tablet; Take 1 tablet by mouth 2 times daily for 10 days    Cardiomyopathy, unspecified type ([address]    Paroxysmal atrial fibrillation ([address]      Co-morbidities managed by Dr. Bina Samano This Encounter   Procedures    XR CHEST STANDARD (2 VW)     Standing Status:   Future     Standing Expiration Date:   1/17/2023     Order Specific Question:   Reason for exam:     Answer:   r/o pna    POCT COVID-19, Antigen     Order Specific Question:   Is this test for diagnosis or screening? Answer:   Screening     Order Specific Question:   Symptomatic for COVID-19 as defined by CDC? Answer:   No     Order Specific Question:   Date of Symptom Onset     Answer:   N/A     Order Specific Question:   Hospitalized for COVID-19? Answer:   No     Order Specific Question:   Admitted to ICU for COVID-19? Answer:   No     Order Specific Question:   Employed in healthcare setting? Answer:   No     Order Specific Question:   Resident in a congregate (group) care setting? Answer:   No     Order Specific Question:   Pregnant: Answer:   No     Order Specific Question:   Previously tested for COVID-19? Answer: Yes    POCT Influenza A/B     Orders Placed This Encounter   Medications    amoxicillin (AMOXIL) 875 MG tablet     Sig: Take 1 tablet by mouth 2 times daily for 10 days     Dispense:  20 tablet     Refill:  0     There are no discontinued medications. Return if symptoms worsen or fail to improve. Reviewed with the patient: current clinical status, medications, activities and diet. Side effects, adverse effects of the medication prescribed today, as well as treatment plan/ rationale and result expectations have been discussed with the patient who expresses understanding and desires to proceed. Close follow up to evaluate treatment results and for coordination of care. I have reviewed the patient's medical history in detail and updated the computerized patient record. Patient identification was verified at the start of the visit: Yes    Total time spent on this encounter: Not billed by time      --RACIEL Joy on 1/17/2022 at 12:56 PM    An electronic signature was used to authenticate this note.

## 2022-01-17 NOTE — PATIENT INSTRUCTIONS
Patient Education        Bronchitis: Care Instructions  Your Care Instructions     Bronchitis is inflammation of the bronchial tubes, which carry air to the lungs. The tubes swell and produce mucus, or phlegm. The mucus and inflamed bronchial tubes make you cough. You may have trouble breathing. Most cases of bronchitis are caused by viruses like those that cause colds. Antibiotics usually do not help and they may be harmful. Bronchitis usually develops rapidly and lasts about 2 to 3 weeks in otherwise healthy people. Follow-up care is a key part of your treatment and safety. Be sure to make and go to all appointments, and call your doctor if you are having problems. It's also a good idea to know your test results and keep a list of the medicines you take. How can you care for yourself at home? · Take all medicines exactly as prescribed. Call your doctor if you think you are having a problem with your medicine. · Get some extra rest.  · Take an over-the-counter pain medicine, such as acetaminophen (Tylenol), ibuprofen (Advil, Motrin), or naproxen (Aleve) to reduce fever and relieve body aches. Read and follow all instructions on the label. · Do not take two or more pain medicines at the same time unless the doctor told you to. Many pain medicines have acetaminophen, which is Tylenol. Too much acetaminophen (Tylenol) can be harmful. · Take an over-the-counter cough medicine to help quiet a dry, hacking cough so that you can sleep. Avoid cough medicines that have more than one active ingredient. Read and follow all instructions on the label. · Do not smoke. Smoking can make bronchitis worse. If you need help quitting, talk to your doctor about stop-smoking programs and medicines. These can increase your chances of quitting for good. When should you call for help? Call 911 anytime you think you may need emergency care. For example, call if:    · You have severe trouble breathing.    Call your doctor now or seek immediate medical care if:    · You have new or worse trouble breathing.     · You cough up dark brown or bloody mucus (sputum).     · You have a new or higher fever.     · You have a new rash. Watch closely for changes in your health, and be sure to contact your doctor if:    · You cough more deeply or more often, especially if you notice more mucus or a change in the color of your mucus.     · You are not getting better as expected. Where can you learn more? Go to https://Spottly.[employer]. org and sign in to your [employer] account. Enter H333 in the [employer] box to learn more about \"Bronchitis: Care Instructions. \"     If you do not have an account, please click on the \"Sign Up Now\" link. Current as of: July 6, 2021               Content Version: 13.1  © 7067-2919 [employer]wise, [employer]. Care instructions adapted under license by [facility] ([facility]). If you have questions about a medical condition or this instruction, always ask your healthcare professional. [address] any warranty or liability for your use of this information.

## 2022-02-06 DIAGNOSIS — M10.072 ACUTE IDIOPATHIC GOUT OF LEFT FOOT: ICD-10-CM

## 2022-02-07 RX ORDER — ALLOPURINOL 300 MG/1
TABLET ORAL
Qty: 90 TABLET | Refills: 0 | Status: SHIPPED | OUTPATIENT
Start: 2022-02-07 | End: 2022-04-15

## 2022-02-07 NOTE — TELEPHONE ENCOUNTER
Future Appointments    Encounter Information    Provider Department Appt Notes   6/10/2022 Aditya Cuellar DO [facility] 6 month   12/16/2022 Evens Valencia MD [facility] annual       Recent Visits    01/17/2022 9 [facility] and [address] [place] [address]   12/16/2021 Primary hypertension   [facility] and Lasha Belle MD

## 2022-03-14 DIAGNOSIS — I10 ESSENTIAL HYPERTENSION: ICD-10-CM

## 2022-03-14 RX ORDER — PRAVASTATIN SODIUM 20 MG
TABLET ORAL
Qty: 90 TABLET | Refills: 3 | Status: SHIPPED | OUTPATIENT
Start: 2022-03-14

## 2022-03-14 RX ORDER — METOPROLOL SUCCINATE 50 MG/1
TABLET, EXTENDED RELEASE ORAL
Qty: 90 TABLET | Refills: 3 | Status: SHIPPED | OUTPATIENT
Start: 2022-03-14

## 2022-03-14 RX ORDER — OMEPRAZOLE 40 MG/1
40 CAPSULE, DELAYED RELEASE ORAL DAILY
Qty: 90 CAPSULE | Refills: 3 | Status: SHIPPED | OUTPATIENT
Start: 2022-03-14

## 2022-03-14 NOTE — TELEPHONE ENCOUNTER
Requesting medication refill. Please approve or deny this request.    Rx requested:  Requested Prescriptions      No prescriptions requested or ordered in this encounter         Last Office Visit:   12/10/2021      Next Visit Date:  Future Appointments   Date Time Provider Sara Shelton   6/10/2022 10:30 AM Wes Arvis Blizzard, [address]   12/16/2022 10:00 AM Clovis Lane MD [address] [place]               Last refill 3/29/21. Please approve or deny.

## 2022-04-15 DIAGNOSIS — M10.072 ACUTE IDIOPATHIC GOUT OF LEFT FOOT: ICD-10-CM

## 2022-04-15 RX ORDER — ALLOPURINOL 300 MG/1
TABLET ORAL
Qty: 90 TABLET | Refills: 0 | Status: SHIPPED | OUTPATIENT
Start: 2022-04-15 | End: 2022-07-05

## 2022-04-15 NOTE — TELEPHONE ENCOUNTER
pharmacy requesting medication refill.  Please approve or deny this request.    Rx requested:  Requested Prescriptions     Pending Prescriptions Disp Refills    allopurinol (ZYLOPRIM) 300 MG tablet [Pharmacy Med Name: ALLOPURINOL  TAB 300MG] 90 tablet 0     Sig: TAKE 1 TABLET DAILY         Last Office Visit:   12/16/2021      Next Visit Date:  Future Appointments   Date Time Provider Sara Shelton   6/10/2022 10:30 AM [facility], [address]   12/16/2022 10:00 AM Fadi Salazar MD [address] [place] [address] 7

## 2022-07-01 ENCOUNTER — OFFICE VISIT (OUTPATIENT)
Dept: CARDIOLOGY CLINIC | Age: 72
End: 2022-07-01
Payer: MEDICARE

## 2022-07-01 VITALS
BODY MASS INDEX: 27.96 KG/M2 | DIASTOLIC BLOOD PRESSURE: 70 MMHG | HEART RATE: 64 BPM | SYSTOLIC BLOOD PRESSURE: 128 MMHG | HEIGHT: 73 IN | OXYGEN SATURATION: 96 % | WEIGHT: 211 LBS

## 2022-07-01 DIAGNOSIS — I25.10 CORONARY ARTERY DISEASE INVOLVING NATIVE CORONARY ARTERY OF NATIVE HEART WITHOUT ANGINA PECTORIS: ICD-10-CM

## 2022-07-01 DIAGNOSIS — E78.2 MIXED HYPERLIPIDEMIA: ICD-10-CM

## 2022-07-01 DIAGNOSIS — I48.0 PAROXYSMAL ATRIAL FIBRILLATION (HCC): ICD-10-CM

## 2022-07-01 DIAGNOSIS — Z95.0 PACEMAKER: ICD-10-CM

## 2022-07-01 DIAGNOSIS — I10 ESSENTIAL HYPERTENSION: Primary | ICD-10-CM

## 2022-07-01 DIAGNOSIS — I10 PRIMARY HYPERTENSION: ICD-10-CM

## 2022-07-01 DIAGNOSIS — N18.9 CHRONIC KIDNEY DISEASE, UNSPECIFIED CKD STAGE: ICD-10-CM

## 2022-07-01 DIAGNOSIS — I45.4 BBB (BUNDLE BRANCH BLOCK): ICD-10-CM

## 2022-07-01 PROBLEM — G45.9 TRANSIENT ISCHEMIC ATTACK: Status: ACTIVE | Noted: 2022-07-01

## 2022-07-01 PROCEDURE — 3017F COLORECTAL CA SCREEN DOC REV: CPT | Performed by: INTERNAL MEDICINE

## 2022-07-01 PROCEDURE — G8427 DOCREV CUR MEDS BY ELIG CLIN: HCPCS | Performed by: INTERNAL MEDICINE

## 2022-07-01 PROCEDURE — G8417 CALC BMI ABV UP PARAM F/U: HCPCS | Performed by: INTERNAL MEDICINE

## 2022-07-01 PROCEDURE — 1123F ACP DISCUSS/DSCN MKR DOCD: CPT | Performed by: INTERNAL MEDICINE

## 2022-07-01 PROCEDURE — 99214 OFFICE O/P EST MOD 30 MIN: CPT | Performed by: INTERNAL MEDICINE

## 2022-07-01 PROCEDURE — 93000 ELECTROCARDIOGRAM COMPLETE: CPT | Performed by: INTERNAL MEDICINE

## 2022-07-01 PROCEDURE — 1036F TOBACCO NON-USER: CPT | Performed by: INTERNAL MEDICINE

## 2022-07-01 NOTE — PROGRESS NOTES
Chief Complaint   Patient presents with    Follow-up    Atrial Fibrillation    Dizziness     getting to almost syncope, bending    Shortness of Breath       1-16-15: Patient presents for initial medical evaluation. Patient is followed on a regular basis by Dr. Jeffy Thacker MD. S/p abnormal nuclear stress test with normal LVF, s/p LHC with 40-50% mid LAD stenosis with ? Myocardial bridgigng, normal LVF. Doing well overall. Pt denies chest pain, dyspnea, dyspnea on exertion, change in exercise capacity, fatigue,  nausea, vomiting, diarrhea, constipation, motor weakness, insomnia, weight loss, syncope, dizziness, lightheadedness, palpitations, PND, orthopnea, or claudication. Did have some eye issue that started all of the cardiac workup. Following with opthamologist. S/p carotid US with 1-15% stenosis b/l.     1-30-15: as above, s/p Echo with normal LVF, EF of 60%, mild MR, stage I DD. Left eye is feeling better. Pt denies chest pain, dyspnea, dyspnea on exertion, change in exercise capacity, fatigue,  nausea, vomiting, diarrhea, constipation, motor weakness, insomnia, weight loss, syncope, dizziness, lightheadedness, palpitations, PND, orthopnea, or claudication. 7-3-15: as above, Pt denies chest pain, dyspnea, dyspnea on exertion, change in exercise capacity, fatigue,  nausea, vomiting, diarrhea, constipation, motor weakness, insomnia, weight loss, syncope, dizziness, lightheadedness, palpitations, PND, orthopnea, or claudication. Compliant with meds. No hospitalizations. No LE edema. BP is under control. No muscle or body aches. Lipid profile in Jan/2015 was normal.     1-29-16: as above, apparently developed a syncopal episode a month ago, was hospitalized at [facility] with bradycardia, s/p PPM. Was also noted to have seizures on further testing.  Pt denies chest pain, dyspnea, dyspnea on exertion, change in exercise capacity, fatigue,  nausea, vomiting, diarrhea, constipation, motor weakness, insomnia, weight loss, syncope, dizziness, lightheadedness, palpitations, PND, orthopnea, or claudication. Lipid profile and previous labs reviwed. BP is good. CAD is stable. 3-4-16: as above, s/p hospitalization due to severe CP which was felt to be atypical and MSCK. His BP was high and was placed on his Toprol XL again and feeling much better now. Symptoms have resolved. Pt denies chest pain, dyspnea, dyspnea on exertion, change in exercise capacity, fatigue,  nausea, vomiting, diarrhea, constipation, motor weakness, insomnia, weight loss, syncope, dizziness, lightheadedness, palpitations, PND, orthopnea, or claudication. Bp is good. CAD is stable. PPM site is healing. No seizures. 9-9-16: as above, Pt denies chest pain, dyspnea, dyspnea on exertion, change in exercise capacity, fatigue,  nausea, vomiting, diarrhea, constipation, motor weakness, insomnia, weight loss, syncope, dizziness, lightheadedness, palpitations, PND, orthopnea, or claudication. S/p PPM check with Dr. Paolo Redd. No CHF type symptoms. No hospitalizations. No change in meds. BP and HR are good. Playing golf, going to gym, yard work and no symptoms. No Le ulcers or dicoloration. 5-19-17: Pt denies chest pain, dyspnea, dyspnea on exertion, change in exercise capacity, fatigue,  nausea, vomiting, diarrhea, constipation, motor weakness, insomnia, weight loss, syncope, dizziness, lightheadedness, palpitations, PND, orthopnea, or claudication. No nitro use. BP and hr are good. CAD is stable. No LE discoloration or ulcers. No LE edema. No CHF type symptoms. Lipid profile is normal.  Pacer check is good. EKG with NSR, lbbb.     5-18-18: Pt denies chest pain, dyspnea, dyspnea on exertion, change in exercise capacity, fatigue,  nausea, vomiting, diarrhea, constipation, motor weakness, insomnia, weight loss, syncope, dizziness, lightheadedness, palpitations, PND, orthopnea, or claudication. No nitro use. BP and hr are good. CAD is stable.  No LE discoloration or ulcers. No LE edema. No CHF type symptoms. Lipid profile is normal. No recent hospitalization. No change in meds. s/p PPM check in 11/17 and noted to havesome afibb episodes. He is on NOAC in 10/17 and no bleeding issues. EKG with NSR, LBBB. No neuro type symptoms. 7-24-18: Was pulling weeds in his garden yesterday, was bent down had his head between his legs and went ot his tool shed and on his way back to the garden he experienced a syncopal episode. He had no warning symptoms. He denies having any CP or SOB. No LH/dizz. He denies any seizure like activity, no loss of bowel or bladder function. He woke up on his own, was down for a brief moment he states. He got up to a chair on his own. Had facial trauma. Does not feel like he was dehydrated. He is on NOAC for hx of Pafibb. His BP and HR are good today. No melena. EKG with NSR, LBBB. He exercises on a regular basis without any symptoms. 8-23-18: s/p follow up with EP, PPM check is ok per patient. Labs reviewed with MCV of 100. Renal function was abnormal at GFR of 57 and CR of 1.24. He is on ACEI. S/p ECHO with EF of 40-45%, grade I DD. Pt denies chest pain, dyspnea, dyspnea on exertion, change in exercise capacity, fatigue,  nausea, vomiting, diarrhea, constipation, motor weakness, insomnia, weight loss, syncope, dizziness, lightheadedness, palpitations, PND, orthopnea, or claudication. No nitro use. BP and hr are good. CAD is stable. No LE discoloration or ulcers. No LE edema. No CHF type symptoms. Lipid profile is normal. No recent hospitalization. No change in meds. Has PAfibb and on [address], no bleeding issues. 5-3-19: s/p LHC in 8/18 with 30-40% stenosis of mid LAD, EF of 50%. Was noted recently to have elevated K+, ACEI was stopped and lopressor was increased. Does have baseline CKD, stage III.  Pt denies chest pain, dyspnea, dyspnea on exertion, change in exercise capacity, fatigue,  nausea, vomiting, diarrhea, constipation, motor weakness, insomnia, weight loss, syncope, dizziness, lightheadedness, palpitations, PND, orthopnea, or claudication. No nitro use. BP and hr are good. CAD is stable. No LE discoloration or ulcers. No LE edema. No CHF type symptoms. Lipid profile is normal. No recent hospitalization. Hx of Pafib s/p PPM. On DOAC, no bleeding issues. 12-27-19: EKG with NSR, RBBB. Pt denies chest pain, dyspnea, dyspnea on exertion, change in exercise capacity, fatigue,  nausea, vomiting, diarrhea, constipation, motor weakness, insomnia, weight loss, syncope, dizziness, lightheadedness, palpitations, PND, orthopnea, or claudication. No nitro use. BP and hr are good. CAD is stable. No LE discoloration or ulcers. No LE edema. No CHF type symptoms. Lipid profile is normal. No recent hospitalization. No change in meds. Following up with EP. Remain on DOAC for Pafib, no bleeding issues. Does have CKD, GFR of 56. LDL is 83. He is working out without any issues. 12/11/2020: Patient is doing well overall. He denies any symptoms of angina or congestive heart failure. He is compliant with his medications. No recent hospitalizations. Patient with history of sick sinus syndrome status post permanent pacemaker placement. Following with the EP. Patient with history of paroxysmal atrial fibrillation on oral anticoagulation. He denies any bleeding issues. History of mild coronary disease status post cardiac catheterization in August 2018 with 30 to 40% stenosis of his mid LAD. Does have stage III chronic kidney disease, GFR 58.5. Status post echocardiogram on July 2020 with ejection fraction of 29%, grade 1 diastolic dysfunction no valve abnormalities. EKG with sinus rhythm, right bundle branch block.       6-18-21: Pt denies chest pain, dyspnea, dyspnea on exertion, change in exercise capacity, fatigue,  nausea, vomiting, diarrhea, constipation, motor weakness, insomnia, weight loss, syncope, dizziness, lightheadedness, palpitations, PND, orthopnea, or claudication. No nitro use. BP and hr are good. CAD is stable. No LE discoloration or ulcers. No LE edema. No CHF type symptoms. Lipid profile is normal. No recent hospitalization. No change in meds. History of mild coronary disease status post cardiac catheterization in August 2018 with 30 to 40% stenosis of his mid LAD. Echo in 7/2020 with EF of 50%, grade I DD. Does have stage III chronic kidney disease. Hx of Pafib/SSS s/p PPM, following with EP. On DOAC. EKG with NSR, RBBB. 12-10-21: he is active/exercising without any issues, swimming and lifting weights. Pt denies chest pain, dyspnea, dyspnea on exertion, change in exercise capacity, fatigue,  nausea, vomiting, diarrhea, constipation, motor weakness, insomnia, weight loss, syncope, dizziness, lightheadedness, palpitations, PND, orthopnea, or claudication. No nitro use. BP and hr are good. CAD is stable. No LE discoloration or ulcers. No LE edema. No CHF type symptoms. Lipid profile is normal. No recent hospitalization. No change in meds. History of mild coronary disease status post cardiac catheterization in August 2018 with 30 to 40% stenosis of his mid LAD. Echo in 7/2020 with EF of 50%, grade I DD. Does have stage III chronic kidney disease. Hx of Pafib/SSS s/p PPM, following with EP. On DOAC. EKG with NSR, RBBB. 7-1-22: . Does have some lightheadedness today. Blood pressure is well controlled  History of mild coronary disease status post cardiac catheterization in August 2018 with 30 to 40% stenosis of his mid LAD. Echo in 7/2020 with EF of 50%, grade I DD. Does have stage III chronic kidney disease. Hx of Pafib/SSS s/p PPM, following with EP. On DOAC.   EKG with normal sinus rhythm, right bundle branch block    Patient Active Problem List   Diagnosis    BBB (bundle branch block)    Hypertension    Chronic kidney disease    Hyperlipidemia    GERD (gastroesophageal reflux disease)    Adjustment disorder with mixed anxiety and depressed mood    Chronic low back pain    CAD (coronary artery disease)    Retinal artery occlusion    Pacemaker    Paroxysmal atrial fibrillation (HCC)    Cardiomyopathy (HCC)    Adenomatous polyp of ascending colon    Bronchitis    Chest congestion    Transient ischemic attack       Past Surgical History:   Procedure Laterality Date    ANGIOPLASTY      ANKLE FRACTURE SURGERY Right     [facility]    [facility] Right [facility]    COLONOSCOPY N/A 10/5/2020    COLONOSCOPY WITH POLYPECTOMY performed by Ron Watts MD at [address] [place] [address]      ROTATOR CUFF REPAIR         Social History     Socioeconomic History    Marital status:      Spouse name: None    Number of children: None    Years of education: None    Highest education level: None   Occupational History    None   Tobacco Use    Smoking status: Former Smoker     Packs/day: 1.00     Years: 40.00     Pack years: 40.00     Start date:      Quit date: 2007     Years since quittin.8    Smokeless tobacco: Never Used   Vaping Use    Vaping Use: Never used   Substance and Sexual Activity    Alcohol use: Yes    Drug use: No    Sexual activity: Not Currently   Other Topics Concern    None   Social History Narrative    None     Social Determinants of Health     Financial Resource Strain: Low Risk     Difficulty of Paying Living Expenses: Not hard at all   Food Insecurity: No Food Insecurity    Worried About Running Out of Food in the Last Year: Never true    [place] of Food in the Last Year: Never true   Transportation Needs:     Lack of Transportation (Medical): Not on file    Lack of Transportation (Non-Medical):  Not on file   Physical Activity:     Days of Exercise per Week: Not on file    Minutes of Exercise per Session: Not on file   Stress:     Feeling of Stress : Not on file   Social Connections:  Frequency of Communication with Friends and Family: Not on file    Frequency of Social Gatherings with Friends and Family: Not on file    Attends Jainism Services: Not on file    Active Member of Clubs or Organizations: Not on file    Attends Club or Organization Meetings: Not on file    Marital Status: Not on file   Intimate Partner Violence:     Fear of Current or Ex-Partner: Not on file    Emotionally Abused: Not on file    Physically Abused: Not on file    Sexually Abused: Not on file   Housing Stability:     Unable to Pay for Housing in the Last Year: Not on file    Number of [place] in the Last Year: Not on file    Unstable Housing in the Last Year: Not on file       Family History   Problem Relation Age of Onset    Heart Disease Mother     Kidney Disease Father        Current Outpatient Medications   Medication Sig Dispense Refill    allopurinol (ZYLOPRIM) 300 MG tablet TAKE 1 TABLET DAILY 90 tablet 0    metoprolol succinate (TOPROL XL) 50 MG extended release tablet TAKE 1 TABLET DAILY 90 tablet 3    pravastatin (PRAVACHOL) 20 MG tablet TAKE 1 TABLET DAILY 90 tablet 3    omeprazole (PRILOSEC) 40 MG delayed release capsule Take 1 capsule by mouth daily 90 capsule 3    levETIRAcetam (KEPPRA) 250 MG tablet TAKE ONE TABLET BY MOUTH TWO TIMES A  tablet 0    XARELTO 20 MG TABS tablet TAKE ONE TABLET BY MOUTH EVERY DAY  3     No current facility-administered medications for this visit. Patient has no known allergies. Review of Systems:  General ROS: negative  Psychological ROS: negative  Hematological and Lymphatic ROS: No history of blood clots or bleeding disorder.    Respiratory ROS: no cough, shortness of breath, or wheezing  Cardiovascular ROS: no chest pain or dyspnea on exertion  negative  Gastrointestinal ROS: no abdominal pain, change in bowel habits, or black or bloody stools  Genito-Urinary ROS: no dysuria, trouble voiding, or hematuria  Musculoskeletal ROS: negative  Neurological ROS: no TIA or stroke symptoms  Dermatological ROS: negative    VITALS:  Blood pressure 128/70, pulse 64, height 6' 1\" (1.854 m), weight 211 lb (95.7 kg), SpO2 96 %. Body mass index is 27.84 kg/m². Physical Examination:  General appearance - alert, well appearing, and in no distress  Mental status - alert, oriented to person, place, and time  Neck - Neck is supple, no JVD or carotid bruits. No thyromegaly or adenopathy. Chest - clear to auscultation, no wheezes, rales or rhonchi, symmetric air entry  Heart - normal rate, regular rhythm, normal S1, S2, no murmurs, rubs, clicks or gallops  Abdomen - soft, nontender, nondistended, no masses or organomegaly  Neurological - alert, oriented, normal speech, no focal findings or movement disorder noted  Extremities - peripheral pulses normal, no pedal edema, no clubbing or cyanosis  Skin - normal coloration and turgor, no rashes, no suspicious skin lesions noted    Orders Placed This Encounter   Procedures    EKG 12 lead     NSR, LBBB. ASSESSMENT:     Diagnosis Orders   1. Essential hypertension     2. Paroxysmal atrial fibrillation (HCC)  EKG 12 lead   3. Primary hypertension  EKG 12 lead   4. Coronary artery disease involving native coronary artery of native heart without angina pectoris     5. BBB (bundle branch block)     6. Mixed hyperlipidemia     7. Pacemaker     8. Chronic kidney disease, unspecified CKD stage           PLAN:       As always, aggressive risk factor modification is strongly recommended. We should adhere to the [address] VII guidelines for HTN management and the NCEP ATP III guidelines for LDL-C management. Cardiac diet is always recommended with low fat, cholesterol, calories and sodium. Follow up with Dr. Radha Huffman and neuro. Recheck echocardiogram in the future if warranted by symptoms. Continue with oral anticoagulation. Monitor H/H with PCP    Check EKG    Continue medications at current doses.      Patient was advised and encouraged to check blood pressure at home or at a pharmacy, maintain a logbook, and also call us back if blood pressure are above the target ranges or if it is low. Patient clearly understands and agrees to the instructions. We will need to continue to monitor muscle and liver enzymes, BUN, CR, and electrolytes. Details of medical condition explained and patient was warned about adverse consequences of uncontrolled medical conditions and possible side effects of prescribed medications. Patient was advised to go to the ER if he starts experiencing adverse effects of the medications. patient was instructed to call us back or go to nearby emergency room immediately if symptoms get worse or do not improve. Thank you for allowing me to participate in the care of your patient, please don't hesitate to contact me if you have any further questions.

## 2022-07-05 DIAGNOSIS — M10.072 ACUTE IDIOPATHIC GOUT OF LEFT FOOT: ICD-10-CM

## 2022-07-05 RX ORDER — ALLOPURINOL 300 MG/1
TABLET ORAL
Qty: 90 TABLET | Refills: 1 | Status: SHIPPED | OUTPATIENT
Start: 2022-07-05

## 2022-09-29 ENCOUNTER — HOSPITAL ENCOUNTER (OUTPATIENT)
Dept: CT IMAGING | Age: 72
Discharge: HOME | End: 2022-10-01
Payer: MEDICARE

## 2022-09-29 ENCOUNTER — HOSPITAL ENCOUNTER (OUTPATIENT)
Dept: INTERVENTIONAL RADIOLOGY/VASCULAR | Age: 72
Discharge: HOME | End: 2022-10-01
Payer: MEDICARE

## 2022-09-29 DIAGNOSIS — S43.402A SPRAIN OF LEFT SHOULDER, UNSPECIFIED SHOULDER SPRAIN TYPE, INITIAL ENCOUNTER: ICD-10-CM

## 2022-09-29 DIAGNOSIS — S43.409A SPRAIN OF SHOULDER, UNSPECIFIED LATERALITY, UNSPECIFIED SHOULDER SPRAIN TYPE, INITIAL ENCOUNTER: ICD-10-CM

## 2022-09-29 PROCEDURE — 77002 NEEDLE LOCALIZATION BY XRAY: CPT

## 2022-09-29 PROCEDURE — 23350 INJECTION FOR SHOULDER X-RAY: CPT

## 2022-09-29 PROCEDURE — 2500000003 HC RX 250 WO HCPCS: Performed by: RADIOLOGY

## 2022-09-29 PROCEDURE — 73201 CT UPPER EXTREMITY W/DYE: CPT

## 2022-09-29 PROCEDURE — 73201 CT UPPER EXTREMITY W/DYE: CPT | Performed by: RADIOLOGY

## 2022-09-29 PROCEDURE — 2580000003 HC RX 258: Performed by: RADIOLOGY

## 2022-09-29 PROCEDURE — 2709999900 IR ARTHR/ASP/INJ MAJOR JT/BURSA LEFT WO US

## 2022-09-29 PROCEDURE — 20610 DRAIN/INJ JOINT/BURSA W/O US: CPT | Performed by: RADIOLOGY

## 2022-09-29 PROCEDURE — 6360000004 HC RX CONTRAST MEDICATION: Performed by: RADIOLOGY

## 2022-09-29 RX ORDER — SODIUM CHLORIDE 0.9 % (FLUSH) 0.9 %
SYRINGE (ML) INJECTION
Status: COMPLETED | OUTPATIENT
Start: 2022-09-29 | End: 2022-09-29

## 2022-09-29 RX ORDER — LIDOCAINE HYDROCHLORIDE 20 MG/ML
INJECTION, SOLUTION INFILTRATION; PERINEURAL
Status: COMPLETED | OUTPATIENT
Start: 2022-09-29 | End: 2022-09-29

## 2022-09-29 RX ADMIN — Medication 5 ML: at 09:00

## 2022-09-29 RX ADMIN — IOPAMIDOL 12 ML: 612 INJECTION, SOLUTION INTRAVENOUS at 09:00

## 2022-09-29 RX ADMIN — LIDOCAINE HYDROCHLORIDE 3 ML: 20 INJECTION, SOLUTION INFILTRATION; PERINEURAL at 09:00

## 2022-09-29 RX ADMIN — LIDOCAINE HYDROCHLORIDE 4 ML: 20 INJECTION, SOLUTION INFILTRATION; PERINEURAL at 08:56

## 2022-09-29 RX ADMIN — IOPAMIDOL 6 ML: 612 INJECTION, SOLUTION INTRAVENOUS at 08:58

## 2022-09-29 NOTE — DISCHARGE INSTRUCTIONS
ARTHROGRAM DISCHARGE INSTRUCTION    Arthrogram: About This Test  What is an arthrogram?     An arthrogram is a test to take pictures of the tissues inside your joint. These tissues include tendons, ligaments, muscles, and cartilage. First your doctor injects a contrast material, such as a dye or air, into your joint. Sharlot Newton is done with X-rays, an MRI, or a CT scan. Why is it done? An arthrogram is used to find the cause of symptoms in your joint. Symptoms may include pain, swelling, or abnormal movement of your joint. It may also be doneto see if you can be helped with surgery, such as arthroscopy. An arthrogram can:  Find problems in your joint capsule, ligaments, or cartilage. Problems could include tears, arthritis, or disease. For example, this test may be used to help find problems such as rotator cuff tears. It may find problems with the bones of the joint. Find fluid-filled cysts. How is it done? The skin and tissues over the joint will be numbed with medicine. The doctor will put a small needle into your joint. He or she may remove a sample of the joint fluid for testing. Your doctor may use a fluoroscope to guide the needle and take a series of X-ray pictures of the joint. The doctor will inject a contrast material into your joint. This is usually dye, but it can also be saline, air, or a combination. It helps the doctor see the soft tissues of the joint. Then the doctor removes the needle. You may be asked to be still. Or you may be asked to move your joint. You may also have an MRI or a CT scan to get images of the joint. What happens after the test?    Activity:  Rest, avoid strenuous activity such as bending or lifting heavy objects for at least 24 hours. Be aware that your leg or arm (depending on injection site) may feel heavy for up to 4 hours. May shower, bathe, or swim after 24 hours.       Diet:  Resume usual diet    Medication:  * Resume home medication unless otherwise instructed by your physician. * May take mild pain reliever, as needed, such as Acetaminophen or Ibuprofen. INSTRUCTIONS OR COMMENTS RELATIVE TO SPECIFIC EXAM PERFORMED:   - You may have some soreness or swelling in the joint.  - You may hear your joint click or make other noises for a few days. - If any signs of infection (redness, swelling, pus) at the site, Go to ER for evaluation.   - For large amount of bleeding or drainage, Go to ER for evaluation. Please contact Dr. Beverly Stephens office with any questions or concerns post procedure at [phone]. You may also call [phone] and ask to speak with a Radiology RN. If you are unable to contact the above physician and you feel you have a major problem, please go to the Emergency Room for treatment.

## 2022-09-29 NOTE — OR NURSING
0840 Pt brought to specials, procedure explained and consent signed. Assisted pt onto table in supine position. History, allergies and medications verified. Vital signs obtained. 8324 Dr Sharlene Marley here speaking with the patient. Site cleansed, prepped and draped in a sterile fashion. Time out performed. 0826 2% lidocaine used to numb procedure site. Spinal needle inserted and contrast given using fluoro guidance by Dr Sharlene Marley.     0949 Medication combination instilled through spinal needle under fluoro guidance. Pt tolerating procedure and verbal support given throughout.     0901 Needle removed, band aid applied, no bleeding noted. Pt encouraged to take it easy today with lifting, due to medication given. Pt verbalized understanding. 0080 Discharge instructions reviewed with patient. Pt assisted off table. Accompanied pt changing room for ct scan. Ct notified patient ready.

## 2022-10-20 ENCOUNTER — TELEPHONE (OUTPATIENT)
Dept: FAMILY MEDICINE CLINIC | Age: 72
End: 2022-10-20

## 2022-10-20 NOTE — TELEPHONE ENCOUNTER
I did not call him to schedule anything he is not due to come in for anything I can see till December and he s already scheduled.

## 2022-11-04 DIAGNOSIS — M10.072 ACUTE IDIOPATHIC GOUT OF LEFT FOOT: ICD-10-CM

## 2022-11-05 NOTE — TELEPHONE ENCOUNTER
Pharmacy requesting medication refill.  Please approve or deny this request.    Rx requested:  Requested Prescriptions     Pending Prescriptions Disp Refills    allopurinol (ZYLOPRIM) 300 MG tablet [Pharmacy Med Name: ALLOPURINOL  TAB 300MG] 90 tablet 1     Sig: TAKE 1 TABLET DAILY         Last Office Visit:   5/1/2020      Next Visit Date:  Future Appointments   Date Time Provider Sara Shelton   11/28/2022  3:00 PM Blayne Muñoz MD [address]   12/9/2022  9:30 AM Blayne Muñoz MD [address] [place] [address]   4/7/2023 10:45 AM Wes Claudetta Keeling, [facility]

## 2022-11-07 RX ORDER — ALLOPURINOL 300 MG/1
TABLET ORAL
Qty: 90 TABLET | Refills: 1 | Status: SHIPPED | OUTPATIENT
Start: 2022-11-07

## 2022-11-28 ENCOUNTER — OFFICE VISIT (OUTPATIENT)
Dept: FAMILY MEDICINE CLINIC | Age: 72
End: 2022-11-28
Payer: MEDICARE

## 2022-11-28 VITALS
TEMPERATURE: 97.7 F | HEART RATE: 71 BPM | RESPIRATION RATE: 16 BRPM | BODY MASS INDEX: 27.84 KG/M2 | DIASTOLIC BLOOD PRESSURE: 76 MMHG | SYSTOLIC BLOOD PRESSURE: 120 MMHG | OXYGEN SATURATION: 98 % | HEIGHT: 73 IN

## 2022-11-28 DIAGNOSIS — Z01.818 PREOP EXAMINATION: Primary | ICD-10-CM

## 2022-11-28 DIAGNOSIS — M19.012 PRIMARY OSTEOARTHRITIS OF LEFT SHOULDER: ICD-10-CM

## 2022-11-28 PROCEDURE — G8484 FLU IMMUNIZE NO ADMIN: HCPCS | Performed by: FAMILY MEDICINE

## 2022-11-28 PROCEDURE — 1036F TOBACCO NON-USER: CPT | Performed by: FAMILY MEDICINE

## 2022-11-28 PROCEDURE — 3074F SYST BP LT 130 MM HG: CPT | Performed by: FAMILY MEDICINE

## 2022-11-28 PROCEDURE — 3017F COLORECTAL CA SCREEN DOC REV: CPT | Performed by: FAMILY MEDICINE

## 2022-11-28 PROCEDURE — G8417 CALC BMI ABV UP PARAM F/U: HCPCS | Performed by: FAMILY MEDICINE

## 2022-11-28 PROCEDURE — 1123F ACP DISCUSS/DSCN MKR DOCD: CPT | Performed by: FAMILY MEDICINE

## 2022-11-28 PROCEDURE — 99214 OFFICE O/P EST MOD 30 MIN: CPT | Performed by: FAMILY MEDICINE

## 2022-11-28 PROCEDURE — 3078F DIAST BP <80 MM HG: CPT | Performed by: FAMILY MEDICINE

## 2022-11-28 PROCEDURE — G8427 DOCREV CUR MEDS BY ELIG CLIN: HCPCS | Performed by: FAMILY MEDICINE

## 2022-11-28 NOTE — PROGRESS NOTES
Chief Complaint   Patient presents with    Pre-op Exam     Total left shoulder 12/15/22 with LAUREN Lamar       Pt is a 67 y.o. male who presents for a preoperative medical consultation at the request of Dr. Alvin Lamar prior to left shoulder rreplacement. .  Pt complains of left shoulder pain which is severe. Pain is improved with rest.  Pain is worsened by activity. Pt has failed conservative measures such as steroid injections, Synvisc injections, physical therapy, therefore she wishes to proceed with surgical intervention.       Reactions to anesthesia: none    History of excessive bleeding: none    History of blood clots: none    History of blood transfusions: none      Reactions to blood transfusion: none     Past Medical History:   Diagnosis Date    BBB (bundle branch block)     RIGHT    CAD (coronary artery disease) 1/16/2015    Cardiomyopathy, nonischemic ([facility] [address] 8/23/2018    Chronic kidney disease     GERD (gastroesophageal reflux disease)     Hyperlipidemia     Hypertension     Paroxysmal atrial fibrillation ([address] 5/18/2018    Pelvic fracture (HCC)     REMOTE    Retinal artery occlusion 1/16/2015    Seizures ([facility] [address]     History of years ago       Past Surgical History:   Procedure Laterality Date    ANGIOPLASTY      ANKLE FRACTURE SURGERY Right     [facility] REMOV[facility] Left [facility]18/2016    [facility]    CATARACT REMOVAL Right 08/12/2016    [facility]    COLONOSCOPY N/A 10/5/2020    COLONOSCOPY WITH POLYPECTOMY performed by Beckey Gaucher, MD at [address] [place] [address],[address]         Current Outpatient Medications   Medication Sig Dispense Refill    allopurinol (ZYLOPRIM) 300 MG tablet TAKE 1 TABLET DAILY 90 tablet 1    metoprolol succinate (TOPROL XL) 50 MG extended release tablet TAKE 1 TABLET DAILY 90 tablet 3    pravastatin (PRAVACHOL) 20 MG tablet TAKE 1 TABLET DAILY 90 tablet 3    omeprazole (PRILOSEC) 40 MG delayed release capsule Take 1 capsule by mouth daily 90 capsule 3    levETIRAcetam (KEPPRA) 250 MG tablet TAKE ONE TABLET BY MOUTH TWO TIMES A  tablet 0    XARELTO 20 MG TABS tablet TAKE ONE TABLET BY MOUTH EVERY DAY  3     No current facility-administered medications for this visit. No Known Allergies    Social History     Socioeconomic History    Marital status:      Spouse name: Not on file    Number of children: Not on file    Years of education: Not on file    Highest education level: Not on file   Occupational History    Not on file   Tobacco Use    Smoking status: Former     Packs/day: 1.00     Years: 40.00     Pack years: 40.00     Types: Cigarettes     Start date: 5     Quit date: 9/12/2007     Years since quitting: 15.2    Smokeless tobacco: Never   Vaping Use    Vaping Use: Never used   Substance and Sexual Activity    Alcohol use: Yes    Drug use: No    Sexual activity: Not Currently   Other Topics Concern    Not on file   Social History Narrative    Not on file     Social Determinants of Health     Financial Resource Strain: Low Risk     Difficulty of Paying Living Expenses: Not hard at all   Food Insecurity: No Food Insecurity    Worried About Running Out of Food in the Last Year: Never true    Ran Out of Food in the Last Year: Never true   Transportation Needs: Not on file   Physical Activity: Not on file   Stress: Not on file   Social Connections: Not on file   Intimate Partner Violence: Not on file   Housing Stability: Not on file       Family History   Problem Relation Age of Onset    Heart Disease Mother     Kidney Disease Father        Review of Systems - General ROS: negative  Psychological ROS: negative for - REMINDER:DOCUMENT SUBSTANCE ABUSE IN SOCIAL HISTORY ACTIVITY  Hematological and Lymphatic ROS: No history of blood clots or bleeding disorder.    Respiratory ROS: no cough, shortness of breath, or wheezing  Cardiovascular ROS: no chest pain or dyspnea on exertion  Gastrointestinal ROS: no abdominal pain, change in bowel habits, or black or bloody stools  Genito-Urinary ROS: no dysuria, trouble voiding, or hematuria  Musculoskeletal ROS: positive for - joint pain and joint stiffness  Neurological ROS: negative  Dermatological ROS: negative    Blood pressure 120/76, pulse 71, temperature 97.7 °F (36.5 °C), resp. rate 16, height 6' 1\" (1.854 m), SpO2 98 %. Physical Examination: General appearance -alert, well appearing, and in no distress  Mental status - alert, oriented to person, place, and time  Head - atraumatic, normocephalic  Eyes - pupils equal and reactive to light, extraocular muscles intact  Ears - external ears are normal, hearing is grossly normal  Mouth - oral pharynx clear, mucous membranes moist  Neck - supple, no significant adenopathy  Chest - clear to auscultation, no wheezes, rales or rhonchi, symmetric air entry  Heart - normal rate, regular rhythm, normal S1, S2, no murmurs, rubs, clicks or gallops  Abdomen -soft, nontender, nondistended  Neurological - alert, oriented, cranial nerves II-XII intact, motor and sensation are grossly intact bilateral upper and lower extremities. Extremities - peripheral pulses normal, no pedal edema, no clubbing or cyanosis  Skin - warm and dry    Diagnostic data:   I have reviewed recent diagnostic testing including labs, EKG. Please see EKG for interpretation. Assessment and Plan:            Diagnosis Orders   1. Preop examination        2. Primary osteoarthritis of left shoulder                DVT prophylaxis - Recommend early ambulation, venous return exercises, SCDs, KARAN hose and a low molecular weight heparin such as Lovenox 30 mg subcutaneously Q12 hrs. Allergies: Patient has no known allergies.

## 2022-12-06 ENCOUNTER — OFFICE VISIT (OUTPATIENT)
Dept: CARDIOLOGY CLINIC | Age: 72
End: 2022-12-06
Payer: MEDICARE

## 2022-12-06 VITALS
BODY MASS INDEX: 29.16 KG/M2 | WEIGHT: 221 LBS | SYSTOLIC BLOOD PRESSURE: 110 MMHG | HEART RATE: 70 BPM | DIASTOLIC BLOOD PRESSURE: 80 MMHG

## 2022-12-06 DIAGNOSIS — Z01.818 PRE-OPERATIVE CLEARANCE: Primary | ICD-10-CM

## 2022-12-06 PROCEDURE — 1036F TOBACCO NON-USER: CPT | Performed by: INTERNAL MEDICINE

## 2022-12-06 PROCEDURE — 3078F DIAST BP <80 MM HG: CPT | Performed by: INTERNAL MEDICINE

## 2022-12-06 PROCEDURE — G8427 DOCREV CUR MEDS BY ELIG CLIN: HCPCS | Performed by: INTERNAL MEDICINE

## 2022-12-06 PROCEDURE — 93000 ELECTROCARDIOGRAM COMPLETE: CPT | Performed by: INTERNAL MEDICINE

## 2022-12-06 PROCEDURE — G8417 CALC BMI ABV UP PARAM F/U: HCPCS | Performed by: INTERNAL MEDICINE

## 2022-12-06 PROCEDURE — G8484 FLU IMMUNIZE NO ADMIN: HCPCS | Performed by: INTERNAL MEDICINE

## 2022-12-06 PROCEDURE — 3074F SYST BP LT 130 MM HG: CPT | Performed by: INTERNAL MEDICINE

## 2022-12-06 PROCEDURE — 3017F COLORECTAL CA SCREEN DOC REV: CPT | Performed by: INTERNAL MEDICINE

## 2022-12-06 PROCEDURE — 99213 OFFICE O/P EST LOW 20 MIN: CPT | Performed by: INTERNAL MEDICINE

## 2022-12-06 PROCEDURE — 1123F ACP DISCUSS/DSCN MKR DOCD: CPT | Performed by: INTERNAL MEDICINE

## 2022-12-06 NOTE — PROGRESS NOTES
Chief Complaint   Patient presents with    Cardiac Clearance       1-16-15: Patient presents for initial medical evaluation. Patient is followed on a regular basis by Dr. Daquan Collado MD. S/p abnormal nuclear stress test with normal LVF, s/p LHC with 40-50% mid LAD stenosis with ? Myocardial bridgigng, normal LVF. Doing well overall. Pt denies chest pain, dyspnea, dyspnea on exertion, change in exercise capacity, fatigue,  nausea, vomiting, diarrhea, constipation, motor weakness, insomnia, weight loss, syncope, dizziness, lightheadedness, palpitations, PND, orthopnea, or claudication. Did have some eye issue that started all of the cardiac workup. Following with opthamologist. S/p carotid US with 1-15% stenosis b/l.     1-30-15: as above, s/p Echo with normal LVF, EF of 60%, mild MR, stage I DD. Left eye is feeling better. Pt denies chest pain, dyspnea, dyspnea on exertion, change in exercise capacity, fatigue,  nausea, vomiting, diarrhea, constipation, motor weakness, insomnia, weight loss, syncope, dizziness, lightheadedness, palpitations, PND, orthopnea, or claudication. 7-3-15: as above, Pt denies chest pain, dyspnea, dyspnea on exertion, change in exercise capacity, fatigue,  nausea, vomiting, diarrhea, constipation, motor weakness, insomnia, weight loss, syncope, dizziness, lightheadedness, palpitations, PND, orthopnea, or claudication. Compliant with meds. No hospitalizations. No LE edema. BP is under control. No muscle or body aches. Lipid profile in Jan/2015 was normal.     1-29-16: as above, apparently developed a syncopal episode a month ago, was hospitalized at [facility] with bradycardia, s/p PPM. Was also noted to have seizures on further testing. Pt denies chest pain, dyspnea, dyspnea on exertion, change in exercise capacity, fatigue,  nausea, vomiting, diarrhea, constipation, motor weakness, insomnia, weight loss, syncope, dizziness, lightheadedness, palpitations, PND, orthopnea, or claudication. Lipid profile and previous labs reviwed. BP is good. CAD is stable. 3-4-16: as above, s/p hospitalization due to severe CP which was felt to be atypical and MSCK. His BP was high and was placed on his Toprol XL again and feeling much better now. Symptoms have resolved. Pt denies chest pain, dyspnea, dyspnea on exertion, change in exercise capacity, fatigue,  nausea, vomiting, diarrhea, constipation, motor weakness, insomnia, weight loss, syncope, dizziness, lightheadedness, palpitations, PND, orthopnea, or claudication. Bp is good. CAD is stable. PPM site is healing. No seizures. 9-9-16: as above, Pt denies chest pain, dyspnea, dyspnea on exertion, change in exercise capacity, fatigue,  nausea, vomiting, diarrhea, constipation, motor weakness, insomnia, weight loss, syncope, dizziness, lightheadedness, palpitations, PND, orthopnea, or claudication. S/p PPM check with Dr. Jennifer Hall. No CHF type symptoms. No hospitalizations. No change in meds. BP and HR are good. Playing golf, going to gym, yard work and no symptoms. No Le ulcers or dicoloration. 5-19-17: Pt denies chest pain, dyspnea, dyspnea on exertion, change in exercise capacity, fatigue,  nausea, vomiting, diarrhea, constipation, motor weakness, insomnia, weight loss, syncope, dizziness, lightheadedness, palpitations, PND, orthopnea, or claudication. No nitro use. BP and hr are good. CAD is stable. No LE discoloration or ulcers. No LE edema. No CHF type symptoms. Lipid profile is normal.  Pacer check is good. EKG with NSR, lbbb.     5-18-18: Pt denies chest pain, dyspnea, dyspnea on exertion, change in exercise capacity, fatigue,  nausea, vomiting, diarrhea, constipation, motor weakness, insomnia, weight loss, syncope, dizziness, lightheadedness, palpitations, PND, orthopnea, or claudication. No nitro use. BP and hr are good. CAD is stable. No LE discoloration or ulcers. No LE edema. No CHF type symptoms.  Lipid profile is normal. No recent hospitalization. No change in meds. s/p PPM check in 11/17 and noted to havesome afibb episodes. He is on NOAC in 10/17 and no bleeding issues. EKG with NSR, LBBB. No neuro type symptoms. 7-24-18: Was pulling weeds in his garden yesterday, was bent down had his head between his legs and went ot his tool shed and on his way back to the garden he experienced a syncopal episode. He had no warning symptoms. He denies having any CP or SOB. No LH/dizz. He denies any seizure like activity, no loss of bowel or bladder function. He woke up on his own, was down for a brief moment he states. He got up to a chair on his own. Had facial trauma. Does not feel like he was dehydrated. He is on NOAC for hx of Pafibb. His BP and HR are good today. No melena. EKG with NSR, LBBB. He exercises on a regular basis without any symptoms. 8-23-18: s/p follow up with EP, PPM check is ok per patient. Labs reviewed with MCV of 100. Renal function was abnormal at GFR of 57 and CR of 1.24. He is on ACEI. S/p ECHO with EF of 40-45%, grade I DD. Pt denies chest pain, dyspnea, dyspnea on exertion, change in exercise capacity, fatigue,  nausea, vomiting, diarrhea, constipation, motor weakness, insomnia, weight loss, syncope, dizziness, lightheadedness, palpitations, PND, orthopnea, or claudication. No nitro use. BP and hr are good. CAD is stable. No LE discoloration or ulcers. No LE edema. No CHF type symptoms. Lipid profile is normal. No recent hospitalization. No change in meds. Has PAfibb and on [address], no bleeding issues. 5-3-19: s/p LHC in 8/18 with 30-40% stenosis of mid LAD, EF of 50%. Was noted recently to have elevated K+, ACEI was stopped and lopressor was increased. Does have baseline CKD, stage III.  Pt denies chest pain, dyspnea, dyspnea on exertion, change in exercise capacity, fatigue,  nausea, vomiting, diarrhea, constipation, motor weakness, insomnia, weight loss, syncope, dizziness, lightheadedness, palpitations, PND, orthopnea, or claudication. No nitro use. BP and hr are good. CAD is stable. No LE discoloration or ulcers. No LE edema. No CHF type symptoms. Lipid profile is normal. No recent hospitalization. Hx of Pafib s/p PPM. On DOAC, no bleeding issues. 12-27-19: EKG with NSR, RBBB. Pt denies chest pain, dyspnea, dyspnea on exertion, change in exercise capacity, fatigue,  nausea, vomiting, diarrhea, constipation, motor weakness, insomnia, weight loss, syncope, dizziness, lightheadedness, palpitations, PND, orthopnea, or claudication. No nitro use. BP and hr are good. CAD is stable. No LE discoloration or ulcers. No LE edema. No CHF type symptoms. Lipid profile is normal. No recent hospitalization. No change in meds. Following up with EP. Remain on DOAC for Pafib, no bleeding issues. Does have CKD, GFR of 56. LDL is 83. He is working out without any issues. 12/11/2020: Patient is doing well overall. He denies any symptoms of angina or congestive heart failure. He is compliant with his medications. No recent hospitalizations. Patient with history of sick sinus syndrome status post permanent pacemaker placement. Following with the EP. Patient with history of paroxysmal atrial fibrillation on oral anticoagulation. He denies any bleeding issues. History of mild coronary disease status post cardiac catheterization in August 2018 with 30 to 40% stenosis of his mid LAD. Does have stage III chronic kidney disease, GFR 58.5. Status post echocardiogram on July 2020 with ejection fraction of 69%, grade 1 diastolic dysfunction no valve abnormalities. EKG with sinus rhythm, right bundle branch block. 6-18-21: Pt denies chest pain, dyspnea, dyspnea on exertion, change in exercise capacity, fatigue,  nausea, vomiting, diarrhea, constipation, motor weakness, insomnia, weight loss, syncope, dizziness, lightheadedness, palpitations, PND, orthopnea, or claudication. No nitro use. BP and hr are good. CAD is stable.  No LE discoloration or ulcers. No LE edema. No CHF type symptoms. Lipid profile is normal. No recent hospitalization. No change in meds. History of mild coronary disease status post cardiac catheterization in August 2018 with 30 to 40% stenosis of his mid LAD. Echo in 7/2020 with EF of 50%, grade I DD. Does have stage III chronic kidney disease. Hx of Pafib/SSS s/p PPM, following with EP. On DOAC. EKG with NSR, RBBB. 12-10-21: he is active/exercising without any issues, swimming and lifting weights. Pt denies chest pain, dyspnea, dyspnea on exertion, change in exercise capacity, fatigue,  nausea, vomiting, diarrhea, constipation, motor weakness, insomnia, weight loss, syncope, dizziness, lightheadedness, palpitations, PND, orthopnea, or claudication. No nitro use. BP and hr are good. CAD is stable. No LE discoloration or ulcers. No LE edema. No CHF type symptoms. Lipid profile is normal. No recent hospitalization. No change in meds. History of mild coronary disease status post cardiac catheterization in August 2018 with 30 to 40% stenosis of his mid LAD. Echo in 7/2020 with EF of 50%, grade I DD. Does have stage III chronic kidney disease. Hx of Pafib/SSS s/p PPM, following with EP. On DOAC. EKG with NSR, RBBB. 7-1-22: . Does have some lightheadedness today. Blood pressure is well controlled  History of mild coronary disease status post cardiac catheterization in August 2018 with 30 to 40% stenosis of his mid LAD. Echo in 7/2020 with EF of 50%, grade I DD. Does have stage III chronic kidney disease. Hx of Pafib/SSS s/p PPM, following with EP. On DOAC. EKG with normal sinus rhythm, right bundle branch block    12-6-22: Patient needs preoperative clearance for shoulder surgery. To history of mild CAD per cardiac catheterization 2018 with 30 to 40% stenosis of his mid LAD.   Patient with history of paroxysmal atrial fibrillation/sick sinus syndrome status post permanent pacemaker placement following with electrophysiology. He is on full dose oral anticoagulation. No bleeding issues. Most recent echo with ejection fraction of 50% in July 2020. Patient also with history of stage III chronic kidney disease. He denies smoking. Quit back in 2007.   EKG with sinus rhythm, right bundle branch block    Patient Active Problem List   Diagnosis    BBB (bundle branch block)    Hypertension    Chronic kidney disease    Hyperlipidemia    GERD (gastroesophageal reflux disease)    Adjustment disorder with mixed anxiety and depressed mood    Chronic low back pain    CAD (coronary artery disease)    Retinal artery occlusion    Pacemaker    Paroxysmal atrial fibrillation (HCC)    Cardiomyopathy ([address]    Adenomatous polyp of ascending colon    Bronchitis    Chest congestion    Transient ischemic attack       Past Surgical History:   Procedure Laterality Date    ANGIOPLASTY      ANKLE FRACTURE SURGERY Right     [facility]ACT [facility]AL Left [facility]    CATARACT REMOVAL Right 08/12/2016    [facility]    COLONOSCOPY N/A 10/5/2020    COLONOSCOPY WITH POLYPECTOMY performed by Sarika Torre MD at [address] [place] [address],[address]         Social History     Socioeconomic History    Marital status:    Tobacco Use    Smoking status: Former     Packs/day: 1.00     Years: 40.00     Pack years: 40.00     Types: Cigarettes     Start date: 5     Quit date: 9/12/2007     Years since quitting: 15.2    Smokeless tobacco: Never   Vaping Use    Vaping Use: Never used   Substance and Sexual Activity    Alcohol use: Yes    Drug use: No    Sexual activity: Not Currently     Social Determinants of Health     Financial Resource Strain: Low Risk     Difficulty of Paying Living Expenses: Not hard at all   Food Insecurity: No Food Insecurity    Worried About Running Out of Food in the Last Year: Never true    Ran Out of Food in the Last Year: Never true       Family History   Problem Relation Age of Onset    Heart Disease Mother     Kidney Disease Father        Current Outpatient Medications   Medication Sig Dispense Refill    allopurinol (ZYLOPRIM) 300 MG tablet TAKE 1 TABLET DAILY 90 tablet 1    metoprolol succinate (TOPROL XL) 50 MG extended release tablet TAKE 1 TABLET DAILY 90 tablet 3    pravastatin (PRAVACHOL) 20 MG tablet TAKE 1 TABLET DAILY 90 tablet 3    omeprazole (PRILOSEC) 40 MG delayed release capsule Take 1 capsule by mouth daily 90 capsule 3    levETIRAcetam (KEPPRA) 250 MG tablet TAKE ONE TABLET BY MOUTH TWO TIMES A  tablet 0    XARELTO 20 MG TABS tablet TAKE ONE TABLET BY MOUTH EVERY DAY  3     No current facility-administered medications for this visit. Patient has no known allergies. Review of Systems:  General ROS: negative  Psychological ROS: negative  Hematological and Lymphatic ROS: No history of blood clots or bleeding disorder. Respiratory ROS: no cough, shortness of breath, or wheezing  Cardiovascular ROS: no chest pain or dyspnea on exertion  negative  Gastrointestinal ROS: no abdominal pain, change in bowel habits, or black or bloody stools  Genito-Urinary ROS: no dysuria, trouble voiding, or hematuria  Musculoskeletal ROS: negative  Neurological ROS: no TIA or stroke symptoms  Dermatological ROS: negative    VITALS:  Blood pressure 110/80, pulse 70, weight 221 lb (100.2 kg). Body mass index is 29.16 kg/m². Physical Examination:  General appearance - alert, well appearing, and in no distress  Mental status - alert, oriented to person, place, and time  Neck - Neck is supple, no JVD or carotid bruits. No thyromegaly or adenopathy.    Chest - clear to auscultation, no wheezes, rales or rhonchi, symmetric air entry  Heart - normal rate, regular rhythm, normal S1, S2, no murmurs, rubs, clicks or gallops  Abdomen - soft, nontender, nondistended, no masses or organomegaly  Neurological - alert, oriented, normal speech, no focal findings or movement disorder noted  Extremities - peripheral pulses normal, no pedal edema, no clubbing or cyanosis  Skin - normal coloration and turgor, no rashes, no suspicious skin lesions noted    Orders Placed This Encounter   Procedures    EKG 12 Lead     NSR, LBBB. ASSESSMENT:     Diagnosis Orders   1. Pre-operative clearance  EKG 12 Lead            PLAN:       As always, aggressive risk factor modification is strongly recommended. We should adhere to the [address] guidelines for HTN management and the NCEP ATP III guidelines for LDL-C management. Cardiac diet is always recommended with low fat, cholesterol, calories and sodium. Follow up with Dr. Vernon Lugo and neuro. Recheck echocardiogram in the future if warranted by symptoms. Continue with oral anticoagulation. Monitor H/H with PCP    Check EKG    Patient is a intermediate risk patient for the proposed procedure/surgery. No active cardiac conditions such as ischemia, CHF or arrhythmias. Recomment peripoperative BBlocker, GI/DVT prophylaxis. Ok to hold xarelto 2-3 days prior to surgery. .    Continue medications at current doses. Patient was advised and encouraged to check blood pressure at home or at a pharmacy, maintain a logbook, and also call us back if blood pressure are above the target ranges or if it is low. Patient clearly understands and agrees to the instructions. We will need to continue to monitor muscle and liver enzymes, BUN, CR, and electrolytes. Details of medical condition explained and patient was warned about adverse consequences of uncontrolled medical conditions and possible side effects of prescribed medications. Patient was advised to go to the ER if he starts experiencing adverse effects of the medications. patient was instructed to call us back or go to nearby emergency room immediately if symptoms get worse or do not improve.     Thank you for allowing me to participate in the care of your patient, please don't hesitate to contact me if you have any further questions.

## 2022-12-30 SDOH — HEALTH STABILITY: PHYSICAL HEALTH: ON AVERAGE, HOW MANY DAYS PER WEEK DO YOU ENGAGE IN MODERATE TO STRENUOUS EXERCISE (LIKE A BRISK WALK)?: 7 DAYS

## 2022-12-30 SDOH — HEALTH STABILITY: PHYSICAL HEALTH: ON AVERAGE, HOW MANY MINUTES DO YOU ENGAGE IN EXERCISE AT THIS LEVEL?: 60 MIN

## 2022-12-30 ASSESSMENT — PATIENT HEALTH QUESTIONNAIRE - PHQ9
1. LITTLE INTEREST OR PLEASURE IN DOING THINGS: 0
2. FEELING DOWN, DEPRESSED OR HOPELESS: 0
SUM OF ALL RESPONSES TO PHQ QUESTIONS 1-9: 0
SUM OF ALL RESPONSES TO PHQ QUESTIONS 1-9: 0
SUM OF ALL RESPONSES TO PHQ9 QUESTIONS 1 & 2: 0
SUM OF ALL RESPONSES TO PHQ QUESTIONS 1-9: 0
SUM OF ALL RESPONSES TO PHQ QUESTIONS 1-9: 0

## 2022-12-30 ASSESSMENT — LIFESTYLE VARIABLES
HOW OFTEN DO YOU HAVE SIX OR MORE DRINKS ON ONE OCCASION: 1
HOW OFTEN DO YOU HAVE A DRINK CONTAINING ALCOHOL: 2-4 TIMES A MONTH
HOW MANY STANDARD DRINKS CONTAINING ALCOHOL DO YOU HAVE ON A TYPICAL DAY: 1
HOW MANY STANDARD DRINKS CONTAINING ALCOHOL DO YOU HAVE ON A TYPICAL DAY: 1 OR 2
HOW OFTEN DO YOU HAVE A DRINK CONTAINING ALCOHOL: 3

## 2023-01-06 ENCOUNTER — TELEMEDICINE (OUTPATIENT)
Dept: FAMILY MEDICINE CLINIC | Age: 73
End: 2023-01-06

## 2023-01-06 DIAGNOSIS — Z12.5 SCREENING FOR PROSTATE CANCER: ICD-10-CM

## 2023-01-06 DIAGNOSIS — I48.0 PAROXYSMAL ATRIAL FIBRILLATION (HCC): ICD-10-CM

## 2023-01-06 DIAGNOSIS — Z00.00 MEDICARE ANNUAL WELLNESS VISIT, SUBSEQUENT: Primary | ICD-10-CM

## 2023-01-06 DIAGNOSIS — E78.2 MIXED HYPERLIPIDEMIA: ICD-10-CM

## 2023-01-06 DIAGNOSIS — I42.9 CARDIOMYOPATHY, UNSPECIFIED TYPE (HCC): ICD-10-CM

## 2023-01-06 SDOH — ECONOMIC STABILITY: FOOD INSECURITY: WITHIN THE PAST 12 MONTHS, THE FOOD YOU BOUGHT JUST DIDN'T LAST AND YOU DIDN'T HAVE MONEY TO GET MORE.: NEVER TRUE

## 2023-01-06 SDOH — ECONOMIC STABILITY: FOOD INSECURITY: WITHIN THE PAST 12 MONTHS, YOU WORRIED THAT YOUR FOOD WOULD RUN OUT BEFORE YOU GOT MONEY TO BUY MORE.: NEVER TRUE

## 2023-01-06 ASSESSMENT — SOCIAL DETERMINANTS OF HEALTH (SDOH): HOW HARD IS IT FOR YOU TO PAY FOR THE VERY BASICS LIKE FOOD, HOUSING, MEDICAL CARE, AND HEATING?: NOT HARD AT ALL

## 2023-01-06 NOTE — PATIENT INSTRUCTIONS
Advance Directives: Care Instructions  Overview  An advance directive is a legal way to state your wishes at the end of your life. It tells your family and your doctor what to do if you can't say what you want. There are two main types of advance directives. You can change them any time your wishes change. Living will. This form tells your family and your doctor your wishes about life support and other treatment. The form is also called a declaration. Medical power of . This form lets you name a person to make treatment decisions for you when you can't speak for yourself. This person is called a health care agent (health care proxy, health care surrogate). The form is also called a durable power of  for health care. If you do not have an advance directive, decisions about your medical care may be made by a family member, or by a doctor or a  who doesn't know you. It may help to think of an advance directive as a gift to the people who care for you. If you have one, they won't have to make tough decisions by themselves. For more information, including forms for your state, see the [address] website (www.caringinfo.org/planning/advance-directives/). Follow-up care is a key part of your treatment and safety. Be sure to make and go to all appointments, and call your doctor if you are having problems. It's also a good idea to know your test results and keep a list of the medicines you take. What should you include in an advance directive? Many states have a unique advance directive form. (It may ask you to address specific issues.) Or you might use a universal form that's approved by many states. If your form doesn't tell you what to address, it may be hard to know what to include in your advance directive. Use the questions below to help you get started. Who do you want to make decisions about your medical care if you are not able to?   What life-support measures do you want if you have a serious illness that gets worse over time or can't be cured? What are you most afraid of that might happen? (Maybe you're afraid of having pain, losing your independence, or being kept alive by machines.)  Where would you prefer to die? (Your home? A hospital? A nursing home?)  Do you want to donate your organs when you die? Do you want certain Amish practices performed before you die? When should you call for help? Be sure to contact your doctor if you have any questions. Where can you learn more? Go to http://www.dumont.com/ and enter R264 to learn more about \"Advance Directives: Care Instructions. \"  Current as of: June 16, 2022               Content Version: 13.5  © 7552-9456 Healthwise, Incorporated. Care instructions adapted under license by [facility] ([facility]). If you have questions about a medical condition or this instruction, always ask your healthcare professional. [place] [address] any warranty or liability for your use of this information. Personalized Preventive Plan for Naren Langley - 1/6/2023  Medicare offers a range of preventive health benefits. Some of the tests and screenings are paid in full while other may be subject to a deductible, co-insurance, and/or copay. Some of these benefits include a comprehensive review of your medical history including lifestyle, illnesses that may run in your family, and various assessments and screenings as appropriate. After reviewing your medical record and screening and assessments performed today your provider may have ordered immunizations, labs, imaging, and/or referrals for you. A list of these orders (if applicable) as well as your Preventive Care list are included within your After Visit Summary for your review.     Other Preventive Recommendations:    A preventive eye exam performed by an eye specialist is recommended every 1-2 years to screen for glaucoma; cataracts, macular degeneration, and other eye disorders. A preventive dental visit is recommended every 6 months. Try to get at least 150 minutes of exercise per week or 10,000 steps per day on a pedometer . Order or download the FREE \"Exercise & Physical Activity: Your Everyday Guide\" from The [employer]. Call 9-958.364.4426 or search [employer] online. You need 1088-7329 mg of calcium and 9126-4060 IU of vitamin D per day. It is possible to meet your calcium requirement with diet alone, but a vitamin D supplement is usually necessary to meet this goal.  When exposed to the sun, use a sunscreen that protects against both UVA and UVB radiation with an SPF of 30 or greater. Reapply every 2 to 3 hours or after sweating, drying off with a towel, or swimming. Always wear a seat belt when traveling in a car. Always wear a helmet when riding a bicycle or motorcycle.

## 2023-01-06 NOTE — PROGRESS NOTES
Medicare Annual Wellness Visit    Sherrlyn Olszewski is here for Medicare AWV    Assessment & Plan   Medicare annual wellness visit, subsequent  Screening for prostate cancer  -     PSA Screening; Future  Mixed hyperlipidemia  -     Lipid Panel; Future  Cardiomyopathy, unspecified type [facility])  Assessment & Plan:   Monitored by specialist- no acute findings meriting change in the plan    Paroxysmal atrial fibrillation [facility])  Assessment & Plan:   Monitored by specialist- no acute findings meriting change in the plan      Recommendations for Preventive Services Due: see orders and patient instructions/AVS.  Recommended screening schedule for the next 5-10 years is provided to the patient in written form: see Patient Instructions/AVS.     Return for Medicare Annual Wellness Visit in 1 year. Subjective       Patient's complete Health Risk Assessment and screening values have been reviewed and are found in Flowsheets. The following problems were reviewed today and where indicated follow up appointments were made and/or referrals ordered. Positive Risk Factor Screenings with Interventions:                                       Objective      Patient-Reported Vitals  No data recorded          No Known Allergies  Prior to Visit Medications    Medication Sig Taking?  Authorizing Provider   allopurinol (ZYLOPRIM) 300 MG tablet TAKE 1 TABLET DAILY Yes Aliza Boateng MD   metoprolol succinate (TOPROL XL) 50 MG extended release tablet TAKE 1 TABLET DAILY Yes KRISTEN Chanel CNP   pravastatin (PRAVACHOL) 20 MG tablet TAKE 1 TABLET DAILY Yes KRISTEN Chanel CNP   omeprazole (PRILOSEC) 40 MG delayed release capsule Take 1 capsule by mouth daily Yes Aliza Boateng MD   levETIRAcetam (KEPPRA) 250 MG tablet TAKE ONE TABLET BY MOUTH TWO TIMES A DAY Yes Aliza Boateng MD   XARELTO 20 MG TABS tablet TAKE ONE TABLET BY MOUTH EVERY DAY Yes Historical Provider, MD [place] (Including outside providers/suppliers regularly involved in providing care):   Patient Care Team:  Armando Esparza MD as PCP - General (Family Medicine)  Armando Esparza MD as PCP - [facility]     Reviewed and updated this visit:  Mi Baeza, was evaluated through a synchronous (real-time) audio-video encounter. The patient (or guardian if applicable) is aware that this is a billable service, which includes applicable co-pays. This Virtual Visit was conducted with patient's (and/or legal guardian's) consent. The visit was conducted pursuant to the emergency declaration under the [address] [place] [address], [address] [place] [address] authority and the [employer] General Act. Patient identification was verified, and a caregiver was present when appropriate. The patient was located at Home: [place] [address]. Provider was located at [facility] (Appt Dept): [address] [facility],  [address] [place] [address].

## 2023-01-19 DIAGNOSIS — E78.2 MIXED HYPERLIPIDEMIA: ICD-10-CM

## 2023-01-19 DIAGNOSIS — Z12.5 SCREENING FOR PROSTATE CANCER: ICD-10-CM

## 2023-01-19 LAB
CHOLESTEROL, TOTAL: 120 MG/DL (ref 0–199)
HDLC SERPL-MCNC: 40 MG/DL (ref 40–59)
LDL CHOLESTEROL CALCULATED: 65 MG/DL (ref 0–129)
PROSTATE SPECIFIC ANTIGEN: 2.57 NG/ML (ref 0–4)
TRIGL SERPL-MCNC: 75 MG/DL (ref 0–150)

## 2023-01-20 LAB
ANION GAP SERPL CALCULATED.3IONS-SCNC: 12 MMOL/L (ref 10–20)
BICARBONATE: 27 MMOL/L (ref 21–32)
CALCIUM SERPL-MCNC: 9 MG/DL (ref 8.6–10.3)
CHLORIDE BLD-SCNC: 103 MMOL/L (ref 98–107)
CREAT SERPL-MCNC: 1.26 MG/DL (ref 0.5–1.3)
EGFR MALE: 60 ML/MIN/1.73M2
ERYTHROCYTE [DISTWIDTH] IN BLOOD BY AUTOMATED COUNT: 13.1 % (ref 11.5–14)
GLUCOSE: 121 MG/DL (ref 74–99)
HCT VFR BLD CALC: 35.8 % (ref 41–52)
HEMOGLOBIN: 12 G/DL (ref 13.5–17)
MCHC RBC AUTO-ENTMCNC: 33.5 G/DL (ref 32–36)
MCV RBC AUTO: 98 FL (ref 80–100)
PLATELET # BLD: 186 X10E9/L (ref 150–450)
POTASSIUM SERPL-SCNC: 3.7 MMOL/L (ref 3.5–5.3)
RBC # BLD: 3.65 X10E12/L (ref 4.5–5.9)
SODIUM BLD-SCNC: 138 MMOL/L (ref 136–145)
UREA NITROGEN: 26 MG/DL (ref 6–23)
WBC: 7.2 X10E9/L (ref 4.4–11.3)

## 2023-02-14 ENCOUNTER — OFFICE VISIT (OUTPATIENT)
Dept: INFECTIOUS DISEASES | Age: 73
End: 2023-02-14
Payer: MEDICARE

## 2023-02-14 VITALS
DIASTOLIC BLOOD PRESSURE: 78 MMHG | HEART RATE: 71 BPM | WEIGHT: 227 LBS | SYSTOLIC BLOOD PRESSURE: 102 MMHG | BODY MASS INDEX: 29.95 KG/M2 | TEMPERATURE: 97.2 F

## 2023-02-14 DIAGNOSIS — L08.9: Primary | ICD-10-CM

## 2023-02-14 DIAGNOSIS — S40.252A: Primary | ICD-10-CM

## 2023-02-14 DIAGNOSIS — A49.8 INFECTION CAUSED BY ENTEROBACTER CLOACAE: ICD-10-CM

## 2023-02-14 DIAGNOSIS — T84.50XA INFECTION OF PROSTHETIC JOINT, INITIAL ENCOUNTER (HCC): ICD-10-CM

## 2023-02-14 DIAGNOSIS — T81.49XA SURGICAL SITE INFECTION: ICD-10-CM

## 2023-02-14 PROCEDURE — G8484 FLU IMMUNIZE NO ADMIN: HCPCS | Performed by: INTERNAL MEDICINE

## 2023-02-14 PROCEDURE — 1036F TOBACCO NON-USER: CPT | Performed by: INTERNAL MEDICINE

## 2023-02-14 PROCEDURE — 3074F SYST BP LT 130 MM HG: CPT | Performed by: INTERNAL MEDICINE

## 2023-02-14 PROCEDURE — 3078F DIAST BP <80 MM HG: CPT | Performed by: INTERNAL MEDICINE

## 2023-02-14 PROCEDURE — 1123F ACP DISCUSS/DSCN MKR DOCD: CPT | Performed by: INTERNAL MEDICINE

## 2023-02-14 PROCEDURE — 99213 OFFICE O/P EST LOW 20 MIN: CPT | Performed by: INTERNAL MEDICINE

## 2023-02-14 PROCEDURE — 3017F COLORECTAL CA SCREEN DOC REV: CPT | Performed by: INTERNAL MEDICINE

## 2023-02-14 PROCEDURE — G8427 DOCREV CUR MEDS BY ELIG CLIN: HCPCS | Performed by: INTERNAL MEDICINE

## 2023-02-14 PROCEDURE — G8417 CALC BMI ABV UP PARAM F/U: HCPCS | Performed by: INTERNAL MEDICINE

## 2023-02-14 ASSESSMENT — PATIENT HEALTH QUESTIONNAIRE - PHQ9
SUM OF ALL RESPONSES TO PHQ QUESTIONS 1-9: 0
SUM OF ALL RESPONSES TO PHQ QUESTIONS 1-9: 0
1. LITTLE INTEREST OR PLEASURE IN DOING THINGS: 0
SUM OF ALL RESPONSES TO PHQ QUESTIONS 1-9: 0
2. FEELING DOWN, DEPRESSED OR HOPELESS: 0
SUM OF ALL RESPONSES TO PHQ QUESTIONS 1-9: 0
SUM OF ALL RESPONSES TO PHQ9 QUESTIONS 1 & 2: 0

## 2023-02-14 NOTE — PROGRESS NOTES
Infectious Disease Progress Note       2/14/2023    Patient is a followup regarding  Left shoulder PJI   Hx of Pacemaker  Enterobacter cloacae infection  Surgical site infection    Subjectively, no new complaints at this time. Vitals ok. Spirits ok. CRP 0.43  Labs reviewed with patient and his wife     They are supposed to be leaving on a trip and still have abx to finish via IV. They have decided to cancel the trip and are requesting a letter. Lab Results   Component Value Date    WBC 6.4 02/13/2023    HGB 12.1 (L) 02/13/2023    HCT 35.4 (L) 02/13/2023    MCV 97 02/13/2023     02/13/2023     Lab Results   Component Value Date/Time     02/13/2023 01:50 PM    K 3.8 02/13/2023 01:50 PM    K 4.8 08/24/2018 11:03 AM     02/13/2023 01:50 PM    CO2 23 12/17/2021 08:18 AM    BUN 26 12/17/2021 08:18 AM    CREATININE 1.28 02/13/2023 01:50 PM    GLUCOSE 96 02/13/2023 01:50 PM    CALCIUM 9.2 02/13/2023 01:50 PM        WBC trends are being monitored. Antibiotic doses are being adjusted per most recent renal labs.      Vitals:    02/14/23 1100   BP: 102/78   Pulse: 71   Temp: 97.2 °F (36.2 °C)           Patient Active Problem List   Diagnosis    BBB (bundle branch block)    Hypertension    Chronic kidney disease    Hyperlipidemia    GERD (gastroesophageal reflux disease)    Adjustment disorder with mixed anxiety and depressed mood    Chronic low back pain    CAD (coronary artery disease)    Retinal artery occlusion    Pacemaker    Paroxysmal atrial fibrillation (HCC)    Cardiomyopathy ([facility]    Adenomatous polyp of ascending colon    Bronchitis    Chest congestion    Transient ischemic attack     General: Patient appears in no acute distress, pleasant and cooperative  Skin: no new rashes or erythema or induration  HEENT: PERRL, EOMI, MMM, Dentition is fair, Neck is supple, No cervical adenopathy  Heart: S1 S2  Lungs: clear bilaterally to auscultation  Abdomen: soft, ND, NTTP, +BS  Extrem:+pulses, no calf pain, no asymmetry of the upper or lower extremities. L shoulder is doing well - scar is ok. No erythema or pain to palpation. No warmth. PICC looks ok  Neuro exam: CN II-XII intact, facial expressions symmertrical bilaterally, no slurred speech, muscle strength equal bilaterally          ASSESSMENT:  Left PJI  Hx of Pacemaker  Enterobacter cloacae infection  Surgical site infection      PLAN:  Letter provided for cancellation of their trip   DC PICC on 2/23/23    Imaging and labs were reviewed per medical records and any ID pertinent labs were also addressed  Time spent today in combination of reviewing patient's chart, medications, labs, pictures when pertinent, provider communication as necessary, counseling patient, care/coordination not otherwise reported here, and patient face to face 30 min.   >50% of that time spent counseling and coordination of patient care  Addressed preventive measures such as vaccinations, the importance of annual exam with the PCP, and the importance of health maintenance by dietary and exercise regimens. All questions were answered from an ID perspective and to the best of my ability. Social distancing measures, the importance of face masks that properly cover the nose and mouth in public, and proper hand hygiene will continue to be addressed    Risks and benefits of ID prescribed medications were discussed with patient or supporting staff caring for the patient as appropriate and feedback obtained.      Tanesha Dawson, DO

## 2023-03-02 DIAGNOSIS — I10 ESSENTIAL HYPERTENSION: ICD-10-CM

## 2023-03-02 RX ORDER — PRAVASTATIN SODIUM 20 MG
TABLET ORAL
Qty: 90 TABLET | Refills: 3 | Status: SHIPPED | OUTPATIENT
Start: 2023-03-02

## 2023-03-02 RX ORDER — METOPROLOL SUCCINATE 50 MG/1
TABLET, EXTENDED RELEASE ORAL
Qty: 90 TABLET | Refills: 3 | Status: SHIPPED | OUTPATIENT
Start: 2023-03-02

## 2023-03-02 RX ORDER — OMEPRAZOLE 40 MG/1
CAPSULE, DELAYED RELEASE ORAL
Qty: 90 CAPSULE | Refills: 3 | OUTPATIENT
Start: 2023-03-02

## 2023-03-02 NOTE — TELEPHONE ENCOUNTER
Please approve or deny this refill request. The order is pended. Thank you.     LOV 12/06/2022    Next Visit Date:  Future Appointments   Date Time Provider Sara Shelton   4/7/2023 10:45 AM Polo Zhong [facility]

## 2023-03-15 ENCOUNTER — OFFICE VISIT (OUTPATIENT)
Dept: FAMILY MEDICINE CLINIC | Age: 73
End: 2023-03-15

## 2023-03-15 VITALS
OXYGEN SATURATION: 96 % | BODY MASS INDEX: 30.09 KG/M2 | HEIGHT: 73 IN | TEMPERATURE: 100.5 F | SYSTOLIC BLOOD PRESSURE: 124 MMHG | DIASTOLIC BLOOD PRESSURE: 82 MMHG | WEIGHT: 227 LBS | HEART RATE: 92 BPM

## 2023-03-15 DIAGNOSIS — U07.1 COVID: Primary | ICD-10-CM

## 2023-03-15 LAB
INFLUENZA A ANTIBODY: NEGATIVE
INFLUENZA B ANTIBODY: NEGATIVE
Lab: ABNORMAL
PERFORMING INSTRUMENT: ABNORMAL
QC PASS/FAIL: ABNORMAL
S PYO AG THROAT QL: NORMAL
SARS-COV-2, POC: DETECTED

## 2023-03-15 RX ORDER — DEXTROMETHORPHAN HYDROBROMIDE AND PROMETHAZINE HYDROCHLORIDE 15; 6.25 MG/5ML; MG/5ML
5 SYRUP ORAL 4 TIMES DAILY PRN
Qty: 150 ML | Refills: 0 | Status: SHIPPED | OUTPATIENT
Start: 2023-03-15 | End: 2023-03-23

## 2023-03-15 RX ORDER — PREDNISONE 20 MG/1
20 TABLET ORAL DAILY
Qty: 5 TABLET | Refills: 0 | Status: SHIPPED | OUTPATIENT
Start: 2023-03-15 | End: 2023-03-20

## 2023-03-15 SDOH — ECONOMIC STABILITY: FOOD INSECURITY: WITHIN THE PAST 12 MONTHS, THE FOOD YOU BOUGHT JUST DIDN'T LAST AND YOU DIDN'T HAVE MONEY TO GET MORE.: NEVER TRUE

## 2023-03-15 SDOH — ECONOMIC STABILITY: HOUSING INSECURITY
IN THE LAST 12 MONTHS, WAS THERE A TIME WHEN YOU DID NOT HAVE A STEADY PLACE TO SLEEP OR SLEPT IN A SHELTER (INCLUDING NOW)?: NO

## 2023-03-15 SDOH — ECONOMIC STABILITY: INCOME INSECURITY: HOW HARD IS IT FOR YOU TO PAY FOR THE VERY BASICS LIKE FOOD, HOUSING, MEDICAL CARE, AND HEATING?: NOT HARD AT ALL

## 2023-03-15 SDOH — ECONOMIC STABILITY: FOOD INSECURITY: WITHIN THE PAST 12 MONTHS, YOU WORRIED THAT YOUR FOOD WOULD RUN OUT BEFORE YOU GOT MONEY TO BUY MORE.: NEVER TRUE

## 2023-03-15 NOTE — PROGRESS NOTES
Subjective:      Patient ID: Shanice Smith is a 67 y.o. male who presents today for:  Chief Complaint   Patient presents with    Cough     Congestion,sneezing,sore throat sx started Monday       HPI SUBJECTIVE:   Shanice Smith is a 67 y.o. male who complains of coryza, congestion, sneezing, sore throat, swollen glands, post nasal drip, and myalgias for 3 days. He denies a history of chest pain and shortness of breath. He denies a history of asthma. Patient does smoke cigarettes. OBJECTIVE:  Vitals as noted above. Appearance: oriented to person, place, and time and ill-appearing. ENT- right TM red, dull, bulging, left TM fluid noted, neck has bilateral anterior cervical nodes enlarged, pharynx erythematous without exudate, post nasal drip noted, and nasal mucosa congested. Chest - clear to auscultation, no wheezes, rales or rhonchi, symmetric air entry. ASSESSMENT:   Covid-upper respiratory illness    PLAN:  Symptomatic therapy suggested: push fluids, rest, and use acetaminophen, ibuprofen, antihistamine-decongestant of choice, cough suppressant of choice, Robitussin CF, Dimetapp, Sudafed, Delsym prn.    Past Medical History:   Diagnosis Date    BBB (bundle branch block)     RIGHT    CAD (coronary artery disease) 1/16/2015    Cardiomyopathy, nonischemic ([address] 8/23/2018    Chronic kidney disease     GERD (gastroesophageal reflux disease)     Hyperlipidemia     Hypertension     Paroxysmal atrial fibrillation ([address] 5/18/2018    Pelvic fracture (HCC)     REMOTE    Retinal artery occlusion 1/16/2015    Seizures ([address]     History of years ago     Past Surgical History:   Procedure Laterality Date    ANGIOPLASTY      ANKLE FRACTURE SURGERY Right     [facility]ACT REMOVAL Left [facility]18/2016    [facility]    CATARACT REMOVAL Right 08/12/2016    [facility]    COLONOSCOPY N/A 10/5/2020    COLONOSCOPY WITH POLYPECTOMY performed by Jeimy Bridges MD at [address] [place] [address] ROTATOR CUFF REPAIR       Social History     Socioeconomic History    Marital status:      Spouse name: Not on file    Number of children: Not on file    Years of education: Not on file    Highest education level: Not on file   Occupational History    Not on file   Tobacco Use    Smoking status: Former     Packs/day: 1.00     Years: 40.00     Pack years: 40.00     Types: Cigarettes     Start date: 5     Quit date: 9/12/2007     Years since quitting: 15.5    Smokeless tobacco: Never   Vaping Use    Vaping Use: Never used   Substance and Sexual Activity    Alcohol use: Yes    Drug use: No    Sexual activity: Not Currently   Other Topics Concern    Not on file   Social History Narrative    Not on file     Social Determinants of Health     Financial Resource Strain: Low Risk     Difficulty of Paying Living Expenses: Not hard at all   Food Insecurity: No Food Insecurity    Worried About [address] in the Last Year: Never true    [address]  [employer] in the Last Year: Never true   Transportation Needs: Unknown    Lack of Transportation (Medical): Not on file    Lack of Transportation (Non-Medical):  No   Physical Activity: Sufficiently Active    Days of Exercise per Week: 7 days    Minutes of Exercise per Session: 60 min   Stress: Not on file   Social Connections: Not on file   Intimate Partner Violence: Not on file   Housing Stability: Unknown    Unable to Pay for Housing in the Last Year: Not on file    Number of Places Lived in the Last Year: Not on file    Unstable Housing in the Last Year: No     Family History   Problem Relation Age of Onset    Heart Disease Mother     Kidney Disease Father      No Known Allergies  Current Outpatient Medications   Medication Sig Dispense Refill    predniSONE (DELTASONE) 20 MG tablet Take 1 tablet by mouth daily for 5 days 5 tablet 0    promethazine-dextromethorphan (PROMETHAZINE-DM) 6.25-15 MG/5ML syrup Take 5 mLs by mouth 4 times daily as needed for Cough 150 mL 0 molnupiravir 200 MG capsule Take 4 capsules by mouth in the morning and 4 capsules in the evening. Do all this for 5 days. 40 capsule 0    pravastatin (PRAVACHOL) 20 MG tablet TAKE 1 TABLET DAILY 90 tablet 3    metoprolol succinate (TOPROL XL) 50 MG extended release tablet TAKE 1 TABLET DAILY 90 tablet 3    allopurinol (ZYLOPRIM) 300 MG tablet TAKE 1 TABLET DAILY 90 tablet 1    omeprazole (PRILOSEC) 40 MG delayed release capsule Take 1 capsule by mouth daily 90 capsule 3    levETIRAcetam (KEPPRA) 250 MG tablet TAKE ONE TABLET BY MOUTH TWO TIMES A  tablet 0    XARELTO 20 MG TABS tablet TAKE ONE TABLET BY MOUTH EVERY DAY  3     No current facility-administered medications for this visit. Objective:   /82   Pulse 92   Temp (!) 100.5 °F (38.1 °C) (Temporal)   Ht 6' 1\" (1.854 m)   Wt 227 lb (103 kg)   SpO2 96%   BMI 29.95 kg/m²         Assessment:       Diagnosis Orders   1. COVID  POCT Influenza A/B    POCT COVID-19, Antigen        Results for POC orders placed in visit on 03/15/23   POCT Influenza A/B   Result Value Ref Range    Influenza A Ab Negative     Influenza B Ab Negative    POCT rapid strep A   Result Value Ref Range    Strep A Ag None Detected None Detected      Plan:     Assessment & Plan   Akanksha Andres was seen today for cough. Diagnoses and all orders for this visit:    COVID  -     POCT Influenza A/B  -     POCT COVID-19, Antigen    Other orders  -     POCT rapid strep A  -     predniSONE (DELTASONE) 20 MG tablet; Take 1 tablet by mouth daily for 5 days  -     promethazine-dextromethorphan (PROMETHAZINE-DM) 6.25-15 MG/5ML syrup; Take 5 mLs by mouth 4 times daily as needed for Cough  -     molnupiravir 200 MG capsule; Take 4 capsules by mouth in the morning and 4 capsules in the evening. Do all this for 5 days. Orders Placed This Encounter   Procedures    POCT Influenza A/B    POCT COVID-19, Antigen     Order Specific Question:   Previously tested for COVID-19?      Answer: Yes     Order Specific Question:   Pregnant: Answer:   No    POCT rapid strep A     Orders Placed This Encounter   Medications    predniSONE (DELTASONE) 20 MG tablet     Sig: Take 1 tablet by mouth daily for 5 days     Dispense:  5 tablet     Refill:  0    promethazine-dextromethorphan (PROMETHAZINE-DM) 6.25-15 MG/5ML syrup     Sig: Take 5 mLs by mouth 4 times daily as needed for Cough     Dispense:  150 mL     Refill:  0    molnupiravir 200 MG capsule     Sig: Take 4 capsules by mouth in the morning and 4 capsules in the evening. Do all this for 5 days. Dispense:  40 capsule     Refill:  0     Order Specific Question:   Does this patient qualify for COVID-19 antIviral therapy based on criteria for treatment? Answer:   Yes     There are no discontinued medications. No follow-ups on file. Reviewed with the patient/family: current clinical status & medications. Side effects of the medication prescribed today, as well as treatment plan/rationale and result expectations have been discussed with the patient/family who expresses understanding. Patient will be discharged home in stable condition. Follow up with PCP to evaluate treatment results or return if symptoms worsen or fail to improve. Discussed signs and symptoms which require immediate follow-up in ED/call to 911. Understanding verbalized. I have reviewed the patient's medical history in detail and updated the computerized patient record.     KRISTEN Smith - CNP

## 2023-03-20 RX ORDER — OMEPRAZOLE 40 MG/1
CAPSULE, DELAYED RELEASE ORAL
Qty: 90 CAPSULE | Refills: 3 | Status: SHIPPED | OUTPATIENT
Start: 2023-03-20

## 2023-04-21 ENCOUNTER — OFFICE VISIT (OUTPATIENT)
Dept: CARDIOLOGY CLINIC | Age: 73
End: 2023-04-21

## 2023-04-21 VITALS
HEART RATE: 80 BPM | WEIGHT: 222.6 LBS | DIASTOLIC BLOOD PRESSURE: 80 MMHG | SYSTOLIC BLOOD PRESSURE: 130 MMHG | BODY MASS INDEX: 29.37 KG/M2

## 2023-04-21 DIAGNOSIS — I25.10 CORONARY ARTERY DISEASE INVOLVING NATIVE CORONARY ARTERY OF NATIVE HEART WITHOUT ANGINA PECTORIS: ICD-10-CM

## 2023-04-21 DIAGNOSIS — Z95.0 PACEMAKER: ICD-10-CM

## 2023-04-21 DIAGNOSIS — I45.4 BBB (BUNDLE BRANCH BLOCK): ICD-10-CM

## 2023-04-21 DIAGNOSIS — I10 PRIMARY HYPERTENSION: ICD-10-CM

## 2023-04-21 DIAGNOSIS — N18.31 STAGE 3A CHRONIC KIDNEY DISEASE (HCC): ICD-10-CM

## 2023-04-21 DIAGNOSIS — E78.2 MIXED HYPERLIPIDEMIA: ICD-10-CM

## 2023-04-21 DIAGNOSIS — I48.0 PAROXYSMAL ATRIAL FIBRILLATION (HCC): Primary | ICD-10-CM

## 2023-04-21 PROBLEM — I42.9 CARDIOMYOPATHY (HCC): Status: RESOLVED | Noted: 2018-08-23 | Resolved: 2023-04-21

## 2023-04-21 PROBLEM — R09.89 CHEST CONGESTION: Status: RESOLVED | Noted: 2022-01-17 | Resolved: 2023-04-21

## 2023-04-21 NOTE — PROGRESS NOTES
Chief Complaint   Patient presents with    Atrial Fibrillation    Coronary Artery Disease    Cardiomyopathy    Hypertension    Hyperlipidemia       1-16-15: Patient presents for initial medical evaluation. Patient is followed on a regular basis by Dr. Rahul Artis MD. S/p abnormal nuclear stress test with normal LVF, s/p LHC with 40-50% mid LAD stenosis with ? Myocardial bridgigng, normal LVF. Doing well overall. Pt denies chest pain, dyspnea, dyspnea on exertion, change in exercise capacity, fatigue,  nausea, vomiting, diarrhea, constipation, motor weakness, insomnia, weight loss, syncope, dizziness, lightheadedness, palpitations, PND, orthopnea, or claudication. Did have some eye issue that started all of the cardiac workup. Following with opthamologist. S/p carotid US with 1-15% stenosis b/l.     1-30-15: as above, s/p Echo with normal LVF, EF of 60%, mild MR, stage I DD. Left eye is feeling better. Pt denies chest pain, dyspnea, dyspnea on exertion, change in exercise capacity, fatigue,  nausea, vomiting, diarrhea, constipation, motor weakness, insomnia, weight loss, syncope, dizziness, lightheadedness, palpitations, PND, orthopnea, or claudication. 7-3-15: as above, Pt denies chest pain, dyspnea, dyspnea on exertion, change in exercise capacity, fatigue,  nausea, vomiting, diarrhea, constipation, motor weakness, insomnia, weight loss, syncope, dizziness, lightheadedness, palpitations, PND, orthopnea, or claudication. Compliant with meds. No hospitalizations. No LE edema. BP is under control. No muscle or body aches. Lipid profile in Jan/2015 was normal.     1-29-16: as above, apparently developed a syncopal episode a month ago, was hospitalized at [facility] with bradycardia, s/p PPM. Was also noted to have seizures on further testing.  Pt denies chest pain, dyspnea, dyspnea on exertion, change in exercise capacity, fatigue,  nausea, vomiting, diarrhea, constipation, motor weakness, insomnia, weight loss,

## 2023-05-26 DIAGNOSIS — M10.072 ACUTE IDIOPATHIC GOUT OF LEFT FOOT: ICD-10-CM

## 2023-05-26 RX ORDER — ALLOPURINOL 300 MG/1
TABLET ORAL
Qty: 90 TABLET | Refills: 1 | Status: SHIPPED | OUTPATIENT
Start: 2023-05-26

## 2023-10-23 ENCOUNTER — TELEPHONE (OUTPATIENT)
Dept: NEUROLOGY | Facility: CLINIC | Age: 73
End: 2023-10-23
Payer: MEDICARE

## 2023-10-23 DIAGNOSIS — G40.909 SEIZURE DISORDER (MULTI): Primary | ICD-10-CM

## 2023-10-23 PROBLEM — H57.12 OCULAR PAIN, LEFT EYE: Status: ACTIVE | Noted: 2023-10-23

## 2023-10-23 PROBLEM — I45.4 BUNDLE BRANCH BLOCK: Status: ACTIVE | Noted: 2023-10-23

## 2023-10-23 PROBLEM — S06.9XAA TRAUMATIC BRAIN INJURY (MULTI): Status: ACTIVE | Noted: 2023-10-23

## 2023-10-23 PROBLEM — G93.40 ENCEPHALOPATHY: Status: ACTIVE | Noted: 2023-10-23

## 2023-10-23 PROBLEM — M25.519 SHOULDER PAIN: Status: ACTIVE | Noted: 2023-10-23

## 2023-10-23 RX ORDER — ALLOPURINOL 300 MG/1
1 TABLET ORAL DAILY
COMMUNITY

## 2023-10-23 RX ORDER — PREDNISONE 20 MG/1
TABLET ORAL
COMMUNITY
Start: 2023-03-15

## 2023-10-23 RX ORDER — RIVAROXABAN 20 MG/1
TABLET, FILM COATED ORAL
COMMUNITY

## 2023-10-23 RX ORDER — MOLNUPIRAVIR 200 MG/1
CAPSULE ORAL
COMMUNITY
Start: 2023-03-15

## 2023-10-23 RX ORDER — ONDANSETRON 4 MG/1
TABLET, ORALLY DISINTEGRATING ORAL
COMMUNITY
Start: 2023-01-14

## 2023-10-23 RX ORDER — OXYCODONE HYDROCHLORIDE 5 MG/1
TABLET ORAL
COMMUNITY
Start: 2023-01-14

## 2023-10-23 RX ORDER — OMEPRAZOLE 40 MG/1
CAPSULE, DELAYED RELEASE ORAL
COMMUNITY

## 2023-10-23 RX ORDER — CEPHALEXIN 500 MG/1
500 CAPSULE ORAL 3 TIMES DAILY
COMMUNITY
Start: 2023-01-09

## 2023-10-23 RX ORDER — LEVETIRACETAM 250 MG/1
TABLET ORAL 2 TIMES DAILY
COMMUNITY
Start: 2021-03-12 | End: 2023-10-23 | Stop reason: SDUPTHER

## 2023-10-23 RX ORDER — METOPROLOL SUCCINATE 50 MG/1
TABLET, EXTENDED RELEASE ORAL
COMMUNITY

## 2023-10-23 RX ORDER — LEVETIRACETAM 250 MG/1
250 TABLET ORAL 2 TIMES DAILY
Qty: 60 TABLET | Refills: 3 | Status: SHIPPED | OUTPATIENT
Start: 2023-10-23 | End: 2023-10-24 | Stop reason: SDUPTHER

## 2023-10-23 RX ORDER — ACETAMINOPHEN 325 MG/1
TABLET ORAL EVERY 6 HOURS PRN
COMMUNITY
Start: 2023-01-14

## 2023-10-23 RX ORDER — METOPROLOL SUCCINATE 25 MG/1
TABLET, EXTENDED RELEASE ORAL
COMMUNITY
Start: 2016-03-10

## 2023-10-23 RX ORDER — LISINOPRIL 5 MG/1
TABLET ORAL
COMMUNITY

## 2023-10-23 RX ORDER — PRAVASTATIN SODIUM 20 MG/1
1 TABLET ORAL DAILY
COMMUNITY

## 2023-10-23 RX ORDER — SULFAMETHOXAZOLE AND TRIMETHOPRIM 800; 160 MG/1; MG/1
1 TABLET ORAL 2 TIMES DAILY
COMMUNITY
Start: 2023-01-09

## 2023-10-23 RX ORDER — PROMETHAZINE HYDROCHLORIDE AND DEXTROMETHORPHAN HYDROBROMIDE 6.25; 15 MG/5ML; MG/5ML
SYRUP ORAL
COMMUNITY
Start: 2023-03-15

## 2023-10-23 NOTE — TELEPHONE ENCOUNTER
Pt came in sep for an apt with you but didn't get the keppra sent to [facility] sent in.

## 2023-10-24 RX ORDER — LEVETIRACETAM 250 MG/1
250 TABLET ORAL 2 TIMES DAILY
Qty: 90 TABLET | Refills: 3 | Status: SHIPPED | OUTPATIENT
Start: 2023-10-24

## 2023-10-24 NOTE — TELEPHONE ENCOUNTER
For the [facility] the mail order only want 90 day supply so the keppra needs to be sent as 90 day supply 180 capsules sent, for mail orders its cheaper for them to send in 90 days supply, can you resend this?

## 2023-12-14 ENCOUNTER — OFFICE VISIT (OUTPATIENT)
Dept: CARDIOLOGY CLINIC | Age: 73
End: 2023-12-14

## 2023-12-14 ENCOUNTER — HOSPITAL ENCOUNTER (OUTPATIENT)
Dept: CARDIOLOGY | Age: 73
Discharge: HOME | End: 2023-12-14

## 2023-12-14 VITALS
HEART RATE: 64 BPM | WEIGHT: 224.8 LBS | BODY MASS INDEX: 29.66 KG/M2 | DIASTOLIC BLOOD PRESSURE: 70 MMHG | SYSTOLIC BLOOD PRESSURE: 110 MMHG

## 2023-12-14 DIAGNOSIS — Q61.3 POLYCYSTIC KIDNEY DISEASE: ICD-10-CM

## 2023-12-14 DIAGNOSIS — I45.4 BBB (BUNDLE BRANCH BLOCK): ICD-10-CM

## 2023-12-14 DIAGNOSIS — I10 PRIMARY HYPERTENSION: ICD-10-CM

## 2023-12-14 DIAGNOSIS — E78.2 MIXED HYPERLIPIDEMIA: ICD-10-CM

## 2023-12-14 DIAGNOSIS — N18.31 STAGE 3A CHRONIC KIDNEY DISEASE (HCC): ICD-10-CM

## 2023-12-14 DIAGNOSIS — I48.0 PAROXYSMAL ATRIAL FIBRILLATION (HCC): Primary | ICD-10-CM

## 2023-12-14 NOTE — PROGRESS NOTES
Chief Complaint   Patient presents with    Atrial Fibrillation    Coronary Artery Disease    Hypertension       1-16-15: Patient presents for initial medical evaluation. Patient is followed on a regular basis by Dr. Ephraim Kuhn MD. S/p abnormal nuclear stress test with normal LVF, s/p LHC with 40-50% mid LAD stenosis with ? Myocardial bridgigng, normal LVF. Doing well overall. Pt denies chest pain, dyspnea, dyspnea on exertion, change in exercise capacity, fatigue,  nausea, vomiting, diarrhea, constipation, motor weakness, insomnia, weight loss, syncope, dizziness, lightheadedness, palpitations, PND, orthopnea, or claudication. Did have some eye issue that started all of the cardiac workup. Following with opthamologist. S/p carotid US with 1-15% stenosis b/l.     1-30-15: as above, s/p Echo with normal LVF, EF of 60%, mild MR, stage I DD. Left eye is feeling better. Pt denies chest pain, dyspnea, dyspnea on exertion, change in exercise capacity, fatigue,  nausea, vomiting, diarrhea, constipation, motor weakness, insomnia, weight loss, syncope, dizziness, lightheadedness, palpitations, PND, orthopnea, or claudication. 7-3-15: as above, Pt denies chest pain, dyspnea, dyspnea on exertion, change in exercise capacity, fatigue,  nausea, vomiting, diarrhea, constipation, motor weakness, insomnia, weight loss, syncope, dizziness, lightheadedness, palpitations, PND, orthopnea, or claudication. Compliant with meds. No hospitalizations. No LE edema. BP is under control. No muscle or body aches. Lipid profile in Jan/2015 was normal.     1-29-16: as above, apparently developed a syncopal episode a month ago, was hospitalized at [facility] with bradycardia, s/p PPM. Was also noted to have seizures on further testing.  Pt denies chest pain, dyspnea, dyspnea on exertion, change in exercise capacity, fatigue,  nausea, vomiting, diarrhea, constipation, motor weakness, insomnia, weight loss, syncope, dizziness, lightheadedness,

## 2023-12-26 DIAGNOSIS — M10.072 ACUTE IDIOPATHIC GOUT OF LEFT FOOT: ICD-10-CM

## 2023-12-27 RX ORDER — ALLOPURINOL 300 MG/1
TABLET ORAL
Qty: 90 TABLET | Refills: 0 | Status: SHIPPED | OUTPATIENT
Start: 2023-12-27

## 2023-12-27 NOTE — TELEPHONE ENCOUNTER
Future Appointments    Encounter Information   Provider Department Appt Notes   12/12/2024 Carolyn Liao DO [facility] Cardiology yearly     Past Visits    Date Provider Specialty Visit Type Primary Dx   12/14/2023 Carolyn Liao DO Cardiology Office Visit Paroxysmal atrial fibrillation [facility])   04/21/2023 Carolyn Liao DO Cardiology Office Visit Paroxysmal atrial fibrillation [facility])   03/15/2023 KRISTEN Sauceda CNP Family Medicine Office Visit COVID   02/14/2023 Misa Gamboa DO Infectious Diseases Office Visit Foreign body of left shoulder with infection   01/06/2023 KRISTEN Lindsay CNP Family Medicine Telemedicine Medicare annual wellness visit, subsequent

## 2024-01-02 ENCOUNTER — OFFICE VISIT (OUTPATIENT)
Dept: FAMILY MEDICINE CLINIC | Age: 74
End: 2024-01-02
Payer: MEDICARE

## 2024-01-02 VITALS
OXYGEN SATURATION: 97 % | BODY MASS INDEX: 29.8 KG/M2 | HEIGHT: 73 IN | SYSTOLIC BLOOD PRESSURE: 128 MMHG | HEART RATE: 76 BPM | RESPIRATION RATE: 12 BRPM | DIASTOLIC BLOOD PRESSURE: 68 MMHG | WEIGHT: 224.87 LBS | TEMPERATURE: 97.5 F

## 2024-01-02 DIAGNOSIS — R68.83 CHILLS: ICD-10-CM

## 2024-01-02 DIAGNOSIS — R50.9 FEVER, UNSPECIFIED: ICD-10-CM

## 2024-01-02 DIAGNOSIS — R05.8 PRODUCTIVE COUGH: Primary | ICD-10-CM

## 2024-01-02 LAB
INFLUENZA A ANTIBODY: NEGATIVE
INFLUENZA B ANTIBODY: NEGATIVE
Lab: NORMAL
PERFORMING INSTRUMENT: NORMAL
QC PASS/FAIL: NORMAL
SARS-COV-2, POC: NORMAL

## 2024-01-02 PROCEDURE — 1123F ACP DISCUSS/DSCN MKR DOCD: CPT

## 2024-01-02 PROCEDURE — G8417 CALC BMI ABV UP PARAM F/U: HCPCS

## 2024-01-02 PROCEDURE — 1036F TOBACCO NON-USER: CPT

## 2024-01-02 PROCEDURE — 99214 OFFICE O/P EST MOD 30 MIN: CPT

## 2024-01-02 PROCEDURE — G8484 FLU IMMUNIZE NO ADMIN: HCPCS

## 2024-01-02 PROCEDURE — 3074F SYST BP LT 130 MM HG: CPT

## 2024-01-02 PROCEDURE — 87804 INFLUENZA ASSAY W/OPTIC: CPT

## 2024-01-02 PROCEDURE — 3017F COLORECTAL CA SCREEN DOC REV: CPT

## 2024-01-02 PROCEDURE — 87426 SARSCOV CORONAVIRUS AG IA: CPT

## 2024-01-02 PROCEDURE — 3078F DIAST BP <80 MM HG: CPT

## 2024-01-02 PROCEDURE — G8427 DOCREV CUR MEDS BY ELIG CLIN: HCPCS

## 2024-01-02 RX ORDER — BENZONATATE 100 MG/1
100 CAPSULE ORAL 2 TIMES DAILY PRN
Qty: 20 CAPSULE | Refills: 0 | Status: SHIPPED | OUTPATIENT
Start: 2024-01-02 | End: 2024-01-09

## 2024-01-02 ASSESSMENT — PATIENT HEALTH QUESTIONNAIRE - PHQ9
2. FEELING DOWN, DEPRESSED OR HOPELESS: 0
SUM OF ALL RESPONSES TO PHQ QUESTIONS 1-9: 0
SUM OF ALL RESPONSES TO PHQ QUESTIONS 1-9: 0
SUM OF ALL RESPONSES TO PHQ9 QUESTIONS 1 & 2: 0
SUM OF ALL RESPONSES TO PHQ QUESTIONS 1-9: 0
1. LITTLE INTEREST OR PLEASURE IN DOING THINGS: 0
SUM OF ALL RESPONSES TO PHQ QUESTIONS 1-9: 0

## 2024-01-02 ASSESSMENT — ENCOUNTER SYMPTOMS
WHEEZING: 0
SWOLLEN GLANDS: 0
SORE THROAT: 0
SHORTNESS OF BREATH: 0
RHINORRHEA: 1
SINUS PAIN: 0
COLOR CHANGE: 0
COUGH: 1

## 2024-01-02 NOTE — PROGRESS NOTES
250 MG tablet TAKE ONE TABLET BY MOUTH TWO TIMES A  tablet 0    XARELTO 20 MG TABS tablet TAKE ONE TABLET BY MOUTH EVERY DAY  3     No current facility-administered medications on file prior to visit.       No Known Allergies    Review of Systems   Constitutional:  Positive for chills, fatigue and fever.   HENT:  Positive for rhinorrhea (mild). Negative for congestion, ear pain, sinus pain, sneezing and sore throat.    Respiratory:  Positive for cough (productive of phlegm). Negative for shortness of breath and wheezing.    Cardiovascular:  Negative for chest pain and palpitations.   Musculoskeletal:  Positive for myalgias. Negative for arthralgias.   Skin:  Negative for color change, pallor and rash.   Neurological:  Negative for headaches.       OBJECTIVE:  VITALS:  /68 (Site: Left Upper Arm, Position: Sitting, Cuff Size: Medium Adult)   Pulse 76   Temp 97.5 °F (36.4 °C) (Temporal)   Resp 12   Ht 1.854 m (6' 0.99\")   Wt 102 kg (224 lb 13.9 oz)   SpO2 97%   BMI 29.67 kg/m²     Physical Exam  Vitals reviewed.   Constitutional:       General: He is not in acute distress.     Appearance: Normal appearance. He is not ill-appearing.   HENT:      Head: Normocephalic.      Right Ear: Tympanic membrane, ear canal and external ear normal. There is no impacted cerumen.      Left Ear: Tympanic membrane, ear canal and external ear normal. There is no impacted cerumen.      Nose: Nose normal. No congestion or rhinorrhea.      Mouth/Throat:      Mouth: Mucous membranes are moist.      Pharynx: Oropharynx is clear. No oropharyngeal exudate or posterior oropharyngeal erythema.   Cardiovascular:      Rate and Rhythm: Normal rate and regular rhythm.      Pulses: Normal pulses.      Heart sounds: Normal heart sounds. No murmur heard.     No friction rub. No gallop.   Pulmonary:      Effort: Pulmonary effort is normal.      Breath sounds: Normal breath sounds. No wheezing, rhonchi or rales.   Musculoskeletal:

## 2024-01-02 NOTE — PATIENT INSTRUCTIONS
- Will begin antibiotics if XR is positive for pneumonia.      Viral Respiratory Infection: Care Instructions  Overview     A viral respiratory infection is an infection of the nose, sinuses, or throat caused by a virus. Colds and the flu are common types of viral respiratory infections.  The symptoms of a viral respiratory infection often start quickly. They include a fever, sore throat, and runny nose. You may also just not feel well. Or youmay not want to eat much.  Most viral infections can be treated with home care. This may include drinking lots of fluids and taking over-the-counter pain medicine. You will probablyfeel better in 4 to 10 days.  Antibiotics are not used to treat a viral infection. Antibiotics don't killviruses, so they won't help cure a viral illness.  In some cases, a doctor may prescribe antiviral medicine to help your bodyfight a serious viral infection.  Follow-up care is a key part of your treatment and safety. Be sure to make and go to all appointments, and call your doctor if you are having problems. It's also a good idea to know your test results and keep alist of the medicines you take.  How can you care for yourself at home?  To prevent dehydration, drink plenty of fluids. Choose water and other clear liquids until you feel better. If you have kidney, heart, or liver disease and have to limit fluids, talk with your doctor before you increase the amount of fluids you drink.  Ask your doctor if you can take an over-the-counter pain medicine, such as acetaminophen (Tylenol), ibuprofen (Advil, Motrin), or naproxen (Aleve). Be safe with medicines. Read and follow all instructions on the label. No one younger than 20 should take aspirin. It has been linked to Reye syndrome, a serious illness.  Be careful when taking over-the-counter cold or flu medicines and Tylenol at the same time. Many of these medicines have acetaminophen, which is Tylenol. Read the labels to make sure that you are not

## 2024-01-07 PROCEDURE — 93294 REM INTERROG EVL PM/LDLS PM: CPT | Performed by: INTERNAL MEDICINE

## 2024-01-08 SDOH — HEALTH STABILITY: PHYSICAL HEALTH: ON AVERAGE, HOW MANY DAYS PER WEEK DO YOU ENGAGE IN MODERATE TO STRENUOUS EXERCISE (LIKE A BRISK WALK)?: 6 DAYS

## 2024-01-08 SDOH — HEALTH STABILITY: PHYSICAL HEALTH: ON AVERAGE, HOW MANY MINUTES DO YOU ENGAGE IN EXERCISE AT THIS LEVEL?: 30 MIN

## 2024-01-08 ASSESSMENT — PATIENT HEALTH QUESTIONNAIRE - PHQ9
1. LITTLE INTEREST OR PLEASURE IN DOING THINGS: 0
SUM OF ALL RESPONSES TO PHQ QUESTIONS 1-9: 0
2. FEELING DOWN, DEPRESSED OR HOPELESS: 0
SUM OF ALL RESPONSES TO PHQ9 QUESTIONS 1 & 2: 0

## 2024-01-08 ASSESSMENT — LIFESTYLE VARIABLES
HOW OFTEN DO YOU HAVE SIX OR MORE DRINKS ON ONE OCCASION: 1
HOW MANY STANDARD DRINKS CONTAINING ALCOHOL DO YOU HAVE ON A TYPICAL DAY: 1 OR 2
HOW OFTEN DO YOU HAVE A DRINK CONTAINING ALCOHOL: 2-4 TIMES A MONTH
HOW OFTEN DO YOU HAVE A DRINK CONTAINING ALCOHOL: 3
HOW MANY STANDARD DRINKS CONTAINING ALCOHOL DO YOU HAVE ON A TYPICAL DAY: 1

## 2024-01-11 ENCOUNTER — OFFICE VISIT (OUTPATIENT)
Dept: FAMILY MEDICINE CLINIC | Age: 74
End: 2024-01-11

## 2024-01-11 VITALS
HEART RATE: 74 BPM | DIASTOLIC BLOOD PRESSURE: 78 MMHG | OXYGEN SATURATION: 96 % | HEIGHT: 72 IN | SYSTOLIC BLOOD PRESSURE: 130 MMHG | BODY MASS INDEX: 30.07 KG/M2 | WEIGHT: 222 LBS | RESPIRATION RATE: 17 BRPM

## 2024-01-11 DIAGNOSIS — I48.0 PAROXYSMAL ATRIAL FIBRILLATION (HCC): ICD-10-CM

## 2024-01-11 DIAGNOSIS — Z00.00 MEDICARE ANNUAL WELLNESS VISIT, SUBSEQUENT: Primary | ICD-10-CM

## 2024-01-11 DIAGNOSIS — N18.31 STAGE 3A CHRONIC KIDNEY DISEASE (HCC): ICD-10-CM

## 2024-01-11 NOTE — PROGRESS NOTES
Medicare Annual Wellness Visit    Denzel Bateman is here for Medicare AWV    Assessment & Plan   Stage 3a chronic kidney disease (HCC)  Paroxysmal atrial fibrillation (HCC)    Recommendations for Preventive Services Due: see orders and patient instructions/AVS.  Recommended screening schedule for the next 5-10 years is provided to the patient in written form: see Patient Instructions/AVS.     No follow-ups on file.     Subjective       Patient's complete Health Risk Assessment and screening values have been reviewed and are found in Flowsheets. The following problems were reviewed today and where indicated follow up appointments were made and/or referrals ordered.    Positive Risk Factor Screenings with Interventions:                Activity, Diet, and Weight:  On average, how many days per week do you engage in moderate to strenuous exercise (like a brisk walk)?: 6 days  On average, how many minutes do you engage in exercise at this level?: 30 min    Do you eat balanced/healthy meals regularly?: Yes    Body mass index is 30.11 kg/m². (!) Abnormal    Obesity Interventions:  Patient declines any further evaluation or treatment                               Objective   Vitals:    01/11/24 1020   BP: 130/78   Pulse: 74   Resp: 17   SpO2: 96%   Weight: 100.7 kg (222 lb)   Height: 1.829 m (6')      Body mass index is 30.11 kg/m².      General Appearance: alert and oriented to person, place and time, well developed and well- nourished, in no acute distress  Skin: warm and dry, no rash or erythema  Head: normocephalic and atraumatic  Eyes: pupils equal, round, and reactive to light, extraocular eye movements intact, conjunctivae normal  ENT: tympanic membrane, external ear and ear canal normal bilaterally, nose without deformity, nasal mucosa and turbinates normal without polyps  Neck: supple and non-tender without mass, no thyromegaly or thyroid nodules, no cervical lymphadenopathy  Pulmonary/Chest: clear to

## 2024-01-16 ENCOUNTER — TELEPHONE (OUTPATIENT)
Dept: CARDIOLOGY CLINIC | Age: 74
End: 2024-01-16

## 2024-01-16 NOTE — TELEPHONE ENCOUNTER
Patient wrote [employer] message to update his pharmacy to [facility] and update his email address. New pharmacy and email address verified in chart.

## 2024-01-18 ENCOUNTER — OFFICE VISIT (OUTPATIENT)
Dept: FAMILY MEDICINE CLINIC | Age: 74
End: 2024-01-18

## 2024-01-18 VITALS
WEIGHT: 222 LBS | SYSTOLIC BLOOD PRESSURE: 126 MMHG | DIASTOLIC BLOOD PRESSURE: 80 MMHG | HEART RATE: 81 BPM | TEMPERATURE: 97.7 F | HEIGHT: 72 IN | RESPIRATION RATE: 13 BRPM | BODY MASS INDEX: 30.07 KG/M2 | OXYGEN SATURATION: 94 %

## 2024-01-18 DIAGNOSIS — J30.9 ALLERGIC RHINITIS, UNSPECIFIED SEASONALITY, UNSPECIFIED TRIGGER: Primary | ICD-10-CM

## 2024-01-18 RX ORDER — LORATADINE 10 MG/1
10 TABLET ORAL DAILY
Qty: 14 TABLET | Refills: 0 | Status: SHIPPED | OUTPATIENT
Start: 2024-01-18 | End: 2024-02-01

## 2024-01-18 ASSESSMENT — ENCOUNTER SYMPTOMS
NAUSEA: 0
RHINORRHEA: 1
TROUBLE SWALLOWING: 0
DIARRHEA: 0
SINUS PAIN: 1
VOMITING: 0
CHEST TIGHTNESS: 0
PHOTOPHOBIA: 0
EYE DISCHARGE: 1
EYE PAIN: 0
WHEEZING: 0
COUGH: 1
SORE THROAT: 0
EYE ITCHING: 0
SINUS PRESSURE: 1
STRIDOR: 0
ABDOMINAL PAIN: 0
VOICE CHANGE: 0
SHORTNESS OF BREATH: 0
EYE REDNESS: 0

## 2024-01-18 NOTE — PROGRESS NOTES
Subjective:      Patient ID: Denzel Bateman is a 73 y.o. male who presents today for:  Chief Complaint   Patient presents with    Pharyngitis     Patient present today with runny nose, coughing, sore throat and watery eyes since Saturday. He was seen 10 days at the walk in and was feeling better but no he is having these sx. He tried ibuprofen, tessalon tablet and OTC with little relief. Patient declines strep swab.       HPI  Patient is here with c/o   Patient reports watery eyes, sore throat, cough, and nasal drainage.   Says he has no SOB and denies any chest congested.   Says he has never had any issues with allergies in the past.   Reports sx for 6 days.   He was here with similar sx on 1/2/24 and was treated for viral URI.   CXR was neg.  He has been taking OTC NSAID and Tesslon.  Past Medical History:   Diagnosis Date    BBB (bundle branch block)     RIGHT    CAD (coronary artery disease) 01/16/2015    Cardiomyopathy, nonischemic (HCC) 08/23/2018    Chronic kidney disease     GERD (gastroesophageal reflux disease)     Hyperlipidemia     Hypertension     Paroxysmal atrial fibrillation (HCC) 05/18/2018    Pelvic fracture (HCC)     REMOTE    Polycystic kidney disease 12/14/2023    Retinal artery occlusion 01/16/2015    Seizures ([facility])     History of years ago     Past Surgical History:   Procedure Laterality Date    ANGIOPLASTY      ANKLE FRACTURE SURGERY Right     CATARACT REMOVAL Left 07/18/2016    [facility]    CATARACT REMOVAL Right 08/12/2016    [facility]    COLONOSCOPY N/A 10/5/2020    COLONOSCOPY WITH POLYPECTOMY performed by Carlos Neumann MD at [facility]    FRACTURE SURGERY      PELVIC FRACTURE SURGERY      ROTATOR CUFF REPAIR       Social History     Socioeconomic History    Marital status:      Spouse name: Not on file    Number of children: Not on file    Years of education: Not on file    Highest education level: Not on file   Occupational History    Not on file   Tobacco Use    Smoking

## 2024-02-13 RX ORDER — PRAVASTATIN SODIUM 20 MG
20 TABLET ORAL DAILY
Qty: 90 TABLET | Refills: 3 | Status: SHIPPED | OUTPATIENT
Start: 2024-02-13

## 2024-02-13 NOTE — TELEPHONE ENCOUNTER
Requesting medication refill. Please approve or deny this request.    Rx requested:  Requested Prescriptions     Pending Prescriptions Disp Refills    pravastatin (PRAVACHOL) 20 MG tablet 90 tablet 3     Sig: Take 1 tablet by mouth daily         Last Office Visit:   12/14/2023      Next Visit Date:  Future Appointments   Date Time Provider Department Center   12/12/2024  2:15 PM Holiday, Polo STERLING DO [facility]   1/13/2025 10:00 AM Marcell Zazueta MD [facility]               Last refill 03/02/2023. Please approve or deny.

## 2024-04-25 NOTE — TELEPHONE ENCOUNTER
Requesting medication refill. Please approve or deny this request.    Rx requested:  Requested Prescriptions     Pending Prescriptions Disp Refills    rivaroxaban (XARELTO) 20 MG TABS tablet [Pharmacy Med Name: XARELTO TABS 20MG]  0         Last Office Visit:   12/14/2023      Next Visit Date:  Future Appointments   Date Time Provider Department Center   12/12/2024  2:15 PM Holiday, DO [facility]   1/13/2025 10:00 AM Marcell Zazueta MD [facility]               Last refill 12/22/2017. Please approve or deny.

## 2024-04-25 NOTE — TELEPHONE ENCOUNTER
Future Appointments    Encounter Information   Provider Department Appt Notes   12/12/2024 Polo Ruiz DO [facility] Cardiology yearly   1/13/2025 Marcell Zazueta MD [facility] Primary and Specialty Care Annual f/u     Past Visits    Date Provider Specialty Visit Type Primary Dx   01/18/2024 Liyah Levin APRN - CNP Family Medicine Office Visit Allergic rhinitis, unspecified seasonality, unspecified trigger   01/11/2024 Marcell Zazueta MD Family Medicine Office Visit Medicare annual wellness visit, subsequent   01/02/2024 Judy Hodges APRN - CNP Family Medicine Office Visit Productive cough   12/14/2023 Polo Ruiz DO Cardiology Office Visit Paroxysmal atrial fibrillation (HCC)   04/21/2023 Polo Ruiz DO Cardiology Office Visit Paroxysmal atrial fibrillation (HCC)

## 2024-04-26 RX ORDER — OMEPRAZOLE 40 MG/1
40 CAPSULE, DELAYED RELEASE ORAL DAILY
Qty: 90 CAPSULE | Refills: 3 | Status: SHIPPED | OUTPATIENT
Start: 2024-04-26

## 2024-04-29 ENCOUNTER — CLINICAL SUPPORT (OUTPATIENT)
Dept: AUDIOLOGY | Facility: CLINIC | Age: 74
End: 2024-04-29
Payer: MEDICARE

## 2024-04-29 ENCOUNTER — OFFICE VISIT (OUTPATIENT)
Dept: OTOLARYNGOLOGY | Facility: CLINIC | Age: 74
End: 2024-04-29
Payer: MEDICARE

## 2024-04-29 VITALS — BODY MASS INDEX: 30.07 KG/M2 | WEIGHT: 222 LBS | HEIGHT: 72 IN

## 2024-04-29 DIAGNOSIS — H91.22 SUDDEN HEARING LOSS, LEFT: Primary | ICD-10-CM

## 2024-04-29 DIAGNOSIS — H90.3 BILATERAL SENSORINEURAL HEARING LOSS: Primary | ICD-10-CM

## 2024-04-29 DIAGNOSIS — H90.3 ASYMMETRICAL SENSORINEURAL HEARING LOSS: ICD-10-CM

## 2024-04-29 PROCEDURE — 1036F TOBACCO NON-USER: CPT | Performed by: OTOLARYNGOLOGY

## 2024-04-29 PROCEDURE — 99203 OFFICE O/P NEW LOW 30 MIN: CPT | Performed by: OTOLARYNGOLOGY

## 2024-04-29 PROCEDURE — 92557 COMPREHENSIVE HEARING TEST: CPT | Performed by: AUDIOLOGIST

## 2024-04-29 PROCEDURE — 92567 TYMPANOMETRY: CPT | Performed by: AUDIOLOGIST

## 2024-04-29 RX ORDER — PREDNISONE 20 MG/1
TABLET ORAL
Qty: 9 TABLET | Refills: 0 | Status: SHIPPED | OUTPATIENT
Start: 2024-04-29
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
## 2024-05-05 PROCEDURE — 93296 REM INTERROG EVL PM/IDS: CPT | Performed by: INTERNAL MEDICINE

## 2024-05-05 PROCEDURE — 93294 REM INTERROG EVL PM/LDLS PM: CPT | Performed by: INTERNAL MEDICINE

## 2024-05-13 ENCOUNTER — OFFICE VISIT (OUTPATIENT)
Dept: OTOLARYNGOLOGY | Facility: CLINIC | Age: 74
End: 2024-05-13
Payer: MEDICARE

## 2024-05-13 ENCOUNTER — CLINICAL SUPPORT (OUTPATIENT)
Dept: AUDIOLOGY | Facility: CLINIC | Age: 74
End: 2024-05-13
Payer: MEDICARE

## 2024-05-13 DIAGNOSIS — H90.3 BILATERAL SENSORINEURAL HEARING LOSS: Primary | ICD-10-CM

## 2024-05-13 DIAGNOSIS — H91.22 SUDDEN HEARING LOSS, LEFT: Primary | ICD-10-CM

## 2024-05-13 PROCEDURE — 1159F MED LIST DOCD IN RCRD: CPT | Performed by: OTOLARYNGOLOGY

## 2024-05-13 PROCEDURE — 1160F RVW MEDS BY RX/DR IN RCRD: CPT | Performed by: AUDIOLOGIST

## 2024-05-13 PROCEDURE — 1160F RVW MEDS BY RX/DR IN RCRD: CPT | Performed by: OTOLARYNGOLOGY

## 2024-05-13 PROCEDURE — 92557 COMPREHENSIVE HEARING TEST: CPT | Performed by: AUDIOLOGIST

## 2024-05-13 PROCEDURE — 99213 OFFICE O/P EST LOW 20 MIN: CPT | Performed by: OTOLARYNGOLOGY

## 2024-05-13 PROCEDURE — 1159F MED LIST DOCD IN RCRD: CPT | Performed by: AUDIOLOGIST

## 2024-05-13 NOTE — PROGRESS NOTES
The patient returns.  We are seeing him back today surveillance recheck history left suspected sudden hearing loss component.  He has a history of bilateral loss previous but had noted a decline in that left ear and was seen by our dedicated physicians assistant and treated with a course of prednisone.  He has not noted any dramatic improvement or changes.  He denies any worsening.  All remaining ENT inquiry is clear.  No other change in past medical surgical history.      Physical exam:  No acute distress.  The external ear structures appear normal. The ear canals patent and the tympanic membranes are intact without evidence of air-fluid levels, retraction, or congenital defects.  Anterior rhinoscopy notes essentially a midline nasal septum. Examination is noted for normal healthy mucosal membranes without any evidence of lesions, polyps, or exudate. The tongue is normally mobile. There are no lesions on the gingiva, buccal, or oral mucosa. There are no oral cavity masses.  The neck is negative for mass lymphadenopathy. The trachea and parotid are clear. The thyroid bed is grossly unremarkable. The salivary gland structures are grossly unremarkable.    Comparative audiogram shows stable appearance to previous.  Symmetry is noted for the vast majority of this.  Only asymmetry in the lowest frequencies.      Assessment and plan:  History of possible sudden hearing loss left ear compared to baseline.  Baseline itself is not nearly as good as we would like to see but this is anticipated in a patient with 74 years of age.  In light of the above we do recommend optimization of hearing and amplification strategies and follow back up with me with any major changes or issues.  Detailed discussion today with the patient and his wife in this regard with all questions answered.

## 2024-05-13 NOTE — PROGRESS NOTES
Mr. Orourke, age 74, was seen today for a repeat hearing evaluation during his ENT follow up with Dr. Maoy to reassess sudden hearing loss in his left ear.  Unfortunately, he denied any change or improvement in his hearing levels since his last visit.  He does however have an upcoming appointment scheduled to obtain new hearing aids but wanted to follow up here first to ensure he was cleared to do so.    Results:  Otoscopy revealed clear ear canals and tympanic membranes were visualized bilaterally.  Tympanometry could not be tested due to inability to maintain a probe tip seal bilaterally.  Audiometric thresholds revealed a mild sloping to profound sensorineural hearing loss in his right ear and a moderate sloping to profound sensorineural hearing loss in his left ear.  Word recognition scores were fair bilaterally.  Hearing levels have remained stable/unchanged as compared to his last evaluation 4/29/2024.    Recommendations:  Follow-up with PCP, Dr. Rose, as medically directed.  Follow-up with ENT, Dr. Mayo, as medically directed.  Recommend binaural amplification use and optimization.  The patient may pursue new technology at his convenience.  He was provided a copy of today's hearing evaluation to bring to his upcoming appointment to order new hearing aids.  Retest hearing as medically directed or annually to monitor.

## 2024-05-16 ENCOUNTER — HOSPITAL ENCOUNTER (OUTPATIENT)
Dept: CARDIOLOGY | Age: 74
Discharge: HOME | End: 2024-05-16
Payer: MEDICARE

## 2024-05-16 PROCEDURE — 93296 REM INTERROG EVL PM/IDS: CPT

## 2024-05-17 DIAGNOSIS — Z95.0 PACEMAKER: Primary | ICD-10-CM

## 2024-06-12 DIAGNOSIS — G40.909 SEIZURE DISORDER (MULTI): ICD-10-CM

## 2024-06-12 RX ORDER — LEVETIRACETAM 250 MG/1
250 TABLET ORAL 2 TIMES DAILY
Qty: 90 TABLET | Refills: 7 | Status: SHIPPED | OUTPATIENT
Start: 2024-06-12

## 2024-08-22 ENCOUNTER — HOSPITAL ENCOUNTER (OUTPATIENT)
Dept: CARDIOLOGY | Age: 74
Discharge: HOME | End: 2024-08-22
Payer: MEDICARE

## 2024-08-22 PROCEDURE — 93296 REM INTERROG EVL PM/IDS: CPT

## 2024-08-29 PROCEDURE — 93294 REM INTERROG EVL PM/LDLS PM: CPT | Performed by: INTERNAL MEDICINE

## 2024-08-29 PROCEDURE — 93296 REM INTERROG EVL PM/IDS: CPT | Performed by: INTERNAL MEDICINE

## 2024-09-02 DIAGNOSIS — M10.072 ACUTE IDIOPATHIC GOUT OF LEFT FOOT: ICD-10-CM

## 2024-09-02 DIAGNOSIS — I10 ESSENTIAL HYPERTENSION: ICD-10-CM

## 2024-09-03 RX ORDER — METOPROLOL SUCCINATE 50 MG/1
TABLET, EXTENDED RELEASE ORAL
Qty: 90 TABLET | Refills: 0 | Status: SHIPPED | OUTPATIENT
Start: 2024-09-03

## 2024-09-03 RX ORDER — ALLOPURINOL 300 MG/1
TABLET ORAL
Qty: 90 TABLET | Refills: 0 | Status: SHIPPED | OUTPATIENT
Start: 2024-09-03

## 2024-09-10 ENCOUNTER — APPOINTMENT (OUTPATIENT)
Dept: NEUROLOGY | Facility: CLINIC | Age: 74
End: 2024-09-10
Payer: MEDICARE

## 2024-09-19 ENCOUNTER — APPOINTMENT (OUTPATIENT)
Dept: NEUROLOGY | Facility: CLINIC | Age: 74
End: 2024-09-19
Payer: MEDICARE
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
## 2024-09-24 ENCOUNTER — APPOINTMENT (OUTPATIENT)
Dept: NEUROLOGY | Facility: CLINIC | Age: 74
End: 2024-09-24
Payer: MEDICARE

## 2024-09-24 VITALS
HEART RATE: 60 BPM | SYSTOLIC BLOOD PRESSURE: 122 MMHG | DIASTOLIC BLOOD PRESSURE: 78 MMHG | BODY MASS INDEX: 30.23 KG/M2 | WEIGHT: 223.2 LBS | HEIGHT: 72 IN

## 2024-09-24 DIAGNOSIS — G40.909 SEIZURE DISORDER (MULTI): Primary | ICD-10-CM

## 2024-09-24 DIAGNOSIS — G93.40 ENCEPHALOPATHY: ICD-10-CM

## 2024-09-24 PROCEDURE — 1160F RVW MEDS BY RX/DR IN RCRD: CPT

## 2024-09-24 PROCEDURE — 99214 OFFICE O/P EST MOD 30 MIN: CPT

## 2024-09-24 PROCEDURE — 1036F TOBACCO NON-USER: CPT

## 2024-09-24 PROCEDURE — 1159F MED LIST DOCD IN RCRD: CPT

## 2024-09-24 ASSESSMENT — PATIENT HEALTH QUESTIONNAIRE - PHQ9
SUM OF ALL RESPONSES TO PHQ9 QUESTIONS 1 & 2: 0
1. LITTLE INTEREST OR PLEASURE IN DOING THINGS: NOT AT ALL
2. FEELING DOWN, DEPRESSED OR HOPELESS: NOT AT ALL

## 2024-10-23 ENCOUNTER — HOSPITAL ENCOUNTER (OUTPATIENT)
Dept: RADIOLOGY | Facility: HOSPITAL | Age: 74
Discharge: HOME | End: 2024-10-23
Payer: MEDICARE

## 2024-10-23 ENCOUNTER — OFFICE VISIT (OUTPATIENT)
Dept: ORTHOPEDIC SURGERY | Facility: CLINIC | Age: 74
End: 2024-10-23
Payer: MEDICARE

## 2024-10-23 DIAGNOSIS — M25.561 RIGHT KNEE PAIN, UNSPECIFIED CHRONICITY: ICD-10-CM

## 2024-10-23 DIAGNOSIS — M17.11 PRIMARY OSTEOARTHRITIS OF RIGHT KNEE: Primary | ICD-10-CM

## 2024-10-23 PROCEDURE — 1159F MED LIST DOCD IN RCRD: CPT | Performed by: ORTHOPAEDIC SURGERY

## 2024-10-23 PROCEDURE — 1036F TOBACCO NON-USER: CPT | Performed by: ORTHOPAEDIC SURGERY

## 2024-10-23 PROCEDURE — 99214 OFFICE O/P EST MOD 30 MIN: CPT | Performed by: ORTHOPAEDIC SURGERY

## 2024-10-23 PROCEDURE — 20610 DRAIN/INJ JOINT/BURSA W/O US: CPT | Performed by: ORTHOPAEDIC SURGERY

## 2024-10-23 PROCEDURE — 73564 X-RAY EXAM KNEE 4 OR MORE: CPT | Mod: RT

## 2024-10-23 RX ORDER — LIDOCAINE HYDROCHLORIDE 10 MG/ML
5 INJECTION, SOLUTION INFILTRATION; PERINEURAL
Status: COMPLETED | OUTPATIENT
Start: 2024-10-23 | End: 2024-10-23

## 2024-10-23 RX ORDER — BETAMETHASONE SODIUM PHOSPHATE AND BETAMETHASONE ACETATE 3; 3 MG/ML; MG/ML
12 INJECTION, SUSPENSION INTRA-ARTICULAR; INTRALESIONAL; INTRAMUSCULAR; SOFT TISSUE
Status: COMPLETED | OUTPATIENT
Start: 2024-10-23 | End: 2024-10-23

## 2024-10-23 NOTE — PROGRESS NOTES
History: Tru is here for his right knee.  He has had some increased pain over the last few months.  He denies any specific trauma.  He has done well with his reverse shoulder replacement.    Past medical history: Multiple  Medications: Multiple  Allergies: No known drug allergies    Please refer to the intake H&P regarding the patient's review of systems, family history and social history as was done today    HEENT: Normal  Lungs: Clear to auscultation  Heart: RRR  Abdomen: Soft, nontender  Skin: clear  Extremity: He has tenderness more laterally in the right knee.  Slight valgus alignment.  1+ crepitus with range of motion.  Negative Lachman and posterior drawer.  Mild laxity with valgus stress.  No numbness today.  Contralateral exam is normal for strength, motion, stability and neurovascular assessment.    Radiographs: X-rays show near bone-on-bone articulation laterally with slight valgus alignment.    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  We discussed several options and he would like to try cortisone injection today.  He can use over-the-counter medicine and ice as needed.  He understands a potential need for arthroplasty if not improving.  We could also consider viscosupplementation.  He will follow-up in 6 weeks for recheck.    L Inj/Asp: R knee on 10/23/2024 3:08 PM  Indications: pain  Details: 22 G needle, lateral approach  Medications: 5 mL lidocaine 10 mg/mL (1 %); 12 mg betamethasone acet,sod phos 6 mg/mL  Outcome: tolerated well, no immediate complications  Procedure, treatment alternatives, risks and benefits explained, specific risks discussed. Consent was given by the patient. Immediately prior to procedure a time out was called to verify the correct patient, procedure, equipment, support staff and site/side marked as required. Patient was prepped and draped in the usual sterile fashion.           All questions were answered today with the patient.    This note was generated with voice recognition software and may contain grammatical errors.

## 2024-11-19 DIAGNOSIS — I10 ESSENTIAL HYPERTENSION: ICD-10-CM

## 2024-11-19 DIAGNOSIS — M10.072 ACUTE IDIOPATHIC GOUT OF LEFT FOOT: ICD-10-CM

## 2024-11-20 RX ORDER — ALLOPURINOL 300 MG/1
TABLET ORAL
Qty: 90 TABLET | Refills: 3 | Status: SHIPPED | OUTPATIENT
Start: 2024-11-20

## 2024-11-20 RX ORDER — METOPROLOL SUCCINATE 50 MG/1
TABLET, EXTENDED RELEASE ORAL
Qty: 90 TABLET | Refills: 3 | Status: SHIPPED | OUTPATIENT
Start: 2024-11-20

## 2024-11-20 NOTE — TELEPHONE ENCOUNTER
Future Appointments    Encounter Information   Provider Department Appt Notes   12/2/2024 [facility]   12/12/2024 Polo Ruiz,  [facility] Cardiology yearly   1/13/2025 Marcell Zazueta MD [facility] Primary and Specialty Care Annual f/u   3/10/2025 [facility]    6/16/2025 [facility]      Past Visits    Date Provider Specialty Visit Type Primary Dx   01/18/2024 Liyah Levin, APRN - CNP Family Medicine Office Visit Allergic rhinitis, unspecified seasonality, unspecified trigger   01/11/2024 Marcell Zazueta MD Family Medicine Office Visit Medicare annual wellness visit, subsequent

## 2024-11-25 NOTE — PROGRESS NOTES
History: Tru is here for his right knee. We gave him a cortisone injection into the right knee on 10/23/24. He is unsure if the cortisone injection gave him any relief. He is still able to work out and swim.  He has known DJD in the right knee.    Past medical history: Multiple  Medications: Multiple  Allergies: No known drug allergies    Please refer to the intake H&P regarding the patient's review of systems, family history and social history as was done today    HEENT: Normal  Lungs: Clear to auscultation  Heart: RRR  Abdomen: Soft, nontender  Skin: clear  Extremity: He has tenderness more medially.  1+ crepitus with range of motion.  Negative Lachman and posterior drawer.  Mild laxity varus stress.  No numbness or tingling.  Contralateral exam is normal for strength, motion, stability and neurovascular assessment.    Radiographs: Previous x-rays show near bone-on-bone articulation medially with varus alignment.    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  At this point he would rather proceed with knee arthroplasty.  We discussed the 4-month healing process with knee replacement as well as extensive rehab needed.  Again he feels he is ready to proceed.  He would require medical and cardiac clearance as well as to be off his Xarelto for several days prior to surgery.  We can proceed at his discretion and I would see him back preoperatively.  All questions were answered today with the patient.    Scribe Attestation  By signing my name below, Aretha LANTIGUA Scribe   attest that this documentation has been prepared under the direction and in the presence of Cristóbal Nevarez MD.

## 2024-11-27 ENCOUNTER — APPOINTMENT (OUTPATIENT)
Dept: ORTHOPEDIC SURGERY | Facility: CLINIC | Age: 74
End: 2024-11-27
Payer: MEDICARE

## 2024-11-27 DIAGNOSIS — M17.11 PRIMARY OSTEOARTHRITIS OF RIGHT KNEE: Primary | ICD-10-CM

## 2024-11-27 PROCEDURE — 99214 OFFICE O/P EST MOD 30 MIN: CPT | Performed by: ORTHOPAEDIC SURGERY

## 2024-11-27 PROCEDURE — 1159F MED LIST DOCD IN RCRD: CPT | Performed by: ORTHOPAEDIC SURGERY

## 2024-11-27 PROCEDURE — 1036F TOBACCO NON-USER: CPT | Performed by: ORTHOPAEDIC SURGERY

## 2024-12-02 ENCOUNTER — HOSPITAL ENCOUNTER (OUTPATIENT)
Dept: CARDIOLOGY | Age: 74
Discharge: HOME | End: 2024-12-02
Payer: MEDICARE

## 2024-12-02 PROCEDURE — 93280 PM DEVICE PROGR EVAL DUAL: CPT

## 2024-12-06 ENCOUNTER — TELEPHONE (OUTPATIENT)
Dept: ORTHOPEDIC SURGERY | Facility: CLINIC | Age: 74
End: 2024-12-06
Payer: MEDICARE

## 2024-12-06 DIAGNOSIS — M17.11 PRIMARY OSTEOARTHRITIS OF RIGHT KNEE: ICD-10-CM

## 2024-12-06 NOTE — TELEPHONE ENCOUNTER
Patient's wife called stating they spoke with the pacemaker clinic about getting an MRI and they said that he could get one, the radiologist would send a clearance form to them for the MRI was completed.  She wanted to let Dr. Nevarez know this if he wanted to do the MRI instead of the CT.

## 2024-12-12 ENCOUNTER — OFFICE VISIT (OUTPATIENT)
Dept: CARDIOLOGY CLINIC | Age: 74
End: 2024-12-12
Payer: MEDICARE

## 2024-12-12 VITALS
BODY MASS INDEX: 30.37 KG/M2 | WEIGHT: 224 LBS | DIASTOLIC BLOOD PRESSURE: 80 MMHG | HEART RATE: 71 BPM | OXYGEN SATURATION: 96 % | SYSTOLIC BLOOD PRESSURE: 120 MMHG

## 2024-12-12 DIAGNOSIS — I25.10 CORONARY ARTERY DISEASE INVOLVING NATIVE CORONARY ARTERY OF NATIVE HEART WITHOUT ANGINA PECTORIS: ICD-10-CM

## 2024-12-12 DIAGNOSIS — N18.31 STAGE 3A CHRONIC KIDNEY DISEASE (HCC): ICD-10-CM

## 2024-12-12 DIAGNOSIS — E78.2 MIXED HYPERLIPIDEMIA: ICD-10-CM

## 2024-12-12 DIAGNOSIS — I48.0 PAROXYSMAL ATRIAL FIBRILLATION (HCC): Primary | ICD-10-CM

## 2024-12-12 DIAGNOSIS — I10 PRIMARY HYPERTENSION: ICD-10-CM

## 2024-12-12 DIAGNOSIS — I45.4 BBB (BUNDLE BRANCH BLOCK): ICD-10-CM

## 2024-12-12 DIAGNOSIS — Z95.0 PACEMAKER: ICD-10-CM

## 2024-12-12 DIAGNOSIS — I10 ESSENTIAL HYPERTENSION: ICD-10-CM

## 2024-12-12 PROCEDURE — 3017F COLORECTAL CA SCREEN DOC REV: CPT | Performed by: INTERNAL MEDICINE

## 2024-12-12 PROCEDURE — 3079F DIAST BP 80-89 MM HG: CPT | Performed by: INTERNAL MEDICINE

## 2024-12-12 PROCEDURE — 1036F TOBACCO NON-USER: CPT | Performed by: INTERNAL MEDICINE

## 2024-12-12 PROCEDURE — G8484 FLU IMMUNIZE NO ADMIN: HCPCS | Performed by: INTERNAL MEDICINE

## 2024-12-12 PROCEDURE — 1159F MED LIST DOCD IN RCRD: CPT | Performed by: INTERNAL MEDICINE

## 2024-12-12 PROCEDURE — G8427 DOCREV CUR MEDS BY ELIG CLIN: HCPCS | Performed by: INTERNAL MEDICINE

## 2024-12-12 PROCEDURE — 1123F ACP DISCUSS/DSCN MKR DOCD: CPT | Performed by: INTERNAL MEDICINE

## 2024-12-12 PROCEDURE — 3074F SYST BP LT 130 MM HG: CPT | Performed by: INTERNAL MEDICINE

## 2024-12-12 PROCEDURE — G8417 CALC BMI ABV UP PARAM F/U: HCPCS | Performed by: INTERNAL MEDICINE

## 2024-12-12 PROCEDURE — 99214 OFFICE O/P EST MOD 30 MIN: CPT | Performed by: INTERNAL MEDICINE

## 2024-12-12 NOTE — PROGRESS NOTES
echo with ejection fraction of 50% in 2020.  Patient also with history of stage III chronic kidney disease.          Patient Active Problem List   Diagnosis    BBB (bundle branch block)    Hypertension    Chronic kidney disease    Hyperlipidemia    GERD (gastroesophageal reflux disease)    Adjustment disorder with mixed anxiety and depressed mood    Chronic low back pain    CAD (coronary artery disease)    Retinal artery occlusion    Pacemaker    Paroxysmal atrial fibrillation (HCC)    Adenomatous polyp of ascending colon    Bronchitis    Transient ischemic attack    Polycystic kidney disease    Stage 3a chronic kidney disease (HCC)       Past Surgical History:   Procedure Laterality Date    ANGIOPLASTY      ANKLE FRACTURE SURGERY Right     [facility] Left [facility]    [facility] Right 2016    [facility]    COLONOSCOPY N/A 10/5/2020    COLONOSCOPY WITH POLYPECTOMY performed [facility]    FRACTURE SURGERY      PELVIC FRACTURE SURGERY      ROTATOR CUFF REPAIR         Social History     Socioeconomic History    Marital status:    Tobacco Use    Smoking status: Former     Current packs/day: 0.00     Average packs/day: 1 pack/day for 40.7 years (40.7 ttl pk-yrs)     Types: Cigarettes     Start date:      Quit date: 2007     Years since quittin.2    Smokeless tobacco: Never   Vaping Use    Vaping status: Never Used   Substance and Sexual Activity    Alcohol use: Yes    Drug use: No    Sexual activity: Not Currently     Social Determinants of Health     Financial Resource Strain: Low Risk  (3/15/2023)    Overall Financial Resource Strain (CARDIA)     Difficulty of Paying Living Expenses: Not hard at all   Transportation Needs: Unknown (3/15/2023)    PRAPARE - Transportation     Lack of Transportation (Non-Medical): No   Physical Activity: Sufficiently Active (2024)    Exercise Vital Sign     Days of Exercise per Week: 6 days     Minutes of

## 2024-12-16 PROBLEM — M17.11 UNILATERAL PRIMARY OSTEOARTHRITIS, RIGHT KNEE: Status: ACTIVE | Noted: 2024-12-16

## 2024-12-18 ENCOUNTER — TELEPHONE (OUTPATIENT)
Age: 74
End: 2024-12-18

## 2024-12-18 NOTE — TELEPHONE ENCOUNTER
----- Message from Anyi AGUILAR sent at 12/18/2024  3:17 PM EST -----  Regarding: [facility] Appointment Request  [facility] Appointment Request    Patient needs appointment for [facility] Appointment Type: Pre-Op Visit.    Patient Requested Dates(s): Any day but Wednesday and Friday before the surgery date 2/11/2025   Patient Requested Time: Anytime  Provider Name: Marcell Zazueta MD    Reason for Appointment Request: Established Patient - Available appointments did not meet patient need  --------------------------------------------------------------------------------------------------------------------------    Relationship to Patient: Covered Entity (Surgery Scheduler - Mini)     Call Back Information: OK to leave message on voicemail  Preferred Call Back Number: Phone [phone]       Note: Patient will be having a Right Total Knee surgery on February 11, 2025.

## 2024-12-23 ENCOUNTER — HOSPITAL ENCOUNTER (OUTPATIENT)
Dept: RADIOLOGY | Facility: HOSPITAL | Age: 74
Discharge: HOME | End: 2024-12-23
Payer: MEDICARE

## 2024-12-23 DIAGNOSIS — M17.11 PRIMARY OSTEOARTHRITIS OF RIGHT KNEE: ICD-10-CM

## 2024-12-23 PROCEDURE — 73700 CT LOWER EXTREMITY W/O DYE: CPT | Mod: RT

## 2025-01-20 ENCOUNTER — OFFICE VISIT (OUTPATIENT)
Dept: URGENT CARE | Age: 75
End: 2025-01-20
Payer: MEDICARE

## 2025-01-20 VITALS
BODY MASS INDEX: 29.39 KG/M2 | SYSTOLIC BLOOD PRESSURE: 150 MMHG | RESPIRATION RATE: 20 BRPM | WEIGHT: 217 LBS | OXYGEN SATURATION: 95 % | DIASTOLIC BLOOD PRESSURE: 90 MMHG | HEIGHT: 72 IN | TEMPERATURE: 97.3 F | HEART RATE: 62 BPM

## 2025-01-20 DIAGNOSIS — K08.89 ODONTALGIA: Primary | ICD-10-CM

## 2025-01-20 PROCEDURE — 1125F AMNT PAIN NOTED PAIN PRSNT: CPT | Performed by: PHYSICIAN ASSISTANT

## 2025-01-20 PROCEDURE — 1036F TOBACCO NON-USER: CPT | Performed by: PHYSICIAN ASSISTANT

## 2025-01-20 PROCEDURE — 99203 OFFICE O/P NEW LOW 30 MIN: CPT | Performed by: PHYSICIAN ASSISTANT

## 2025-01-20 PROCEDURE — 1159F MED LIST DOCD IN RCRD: CPT | Performed by: PHYSICIAN ASSISTANT

## 2025-01-20 RX ORDER — AMOXICILLIN AND CLAVULANATE POTASSIUM 875; 125 MG/1; MG/1
1 TABLET, FILM COATED ORAL 2 TIMES DAILY
Qty: 14 TABLET | Refills: 0 | Status: SHIPPED | OUTPATIENT
Start: 2025-01-20 | End: 2025-01-27

## 2025-01-20 RX ORDER — LIDOCAINE HYDROCHLORIDE 20 MG/ML
SOLUTION OROPHARYNGEAL
Qty: 100 ML | Refills: 0 | Status: SHIPPED | OUTPATIENT
Start: 2025-01-20

## 2025-01-20 ASSESSMENT — PAIN SCALES - GENERAL: PAINLEVEL_OUTOF10: 7

## 2025-01-20 NOTE — PROGRESS NOTES
Subjective   Patient ID: Tru Manzo is a 74 y.o. male who presents for Jaw Pain (Left side jaw pain affecting left ear per pt ).  HPI  Patient presents for left-sided jaw pain.  Patient reports several weeks of intermittent pain deep to the left mandibular molars.  The patient did see a dentist and the dentist stated there is nothing wrong with the teeth.  X-rays were done at that visit.  No fever or chills.  Ibuprofen has helped the pain.  Sometimes the pain radiates towards the ear.  No trauma, fall, or direct blow.  No other complaints.    Review of Systems    Constitutional: See HPI  ENT: See HPI    Neurologic:  Alert and oriented X4, No numbness, No tingling.    All other systems are negative     Objective     /90 (BP Location: Right arm, Patient Position: Sitting, BP Cuff Size: Adult)   Pulse 62   Temp 36.3 °C (97.3 °F) (Temporal)   Resp 20   Ht 1.829 m (6')   Wt 98.4 kg (217 lb)   SpO2 95%   BMI 29.43 kg/m²     Physical Exam    General:  Alert and oriented, No acute distress.    Eye:  Pupils are equal, round and reactive to light, Normal conjunctiva.    HENT:  Normocephalic, no tenderness along the mandible or mandibular angle; no TMJ tenderness bilaterally; no parotid tenderness or swelling  Neck:  Supple    Respiratory: Respirations are non-labored   Musculoskeletal: Normal ROM and strength  Integumentary:  Warm, Dry, Intact, No pallor, No rash.    Neurologic:  Alert, Oriented, Normal sensory, Cranial Nerves II-XII are grossly intact  Psychiatric:  Cooperative, Appropriate mood & affect.    Assessment/Plan   Exam is unremarkable at this time.  Etiology is unclear.  Will treat with Augmentin and lidocaine for possible periapical abscess/infection.  No x-ray today as there is no trauma.  Patient's clinical presentation is otherwise unremarkable at this time. Patient is discharged with instructions to follow-up with primary care or seek emergency medical attention for worsening symptoms or  any new concerns.  Problem List Items Addressed This Visit    None  Visit Diagnoses       Odontalgia    -  Primary    Relevant Medications    amoxicillin-pot clavulanate (Augmentin) 875-125 mg tablet    lidocaine (Lidocaine Viscous) 2 % solution            Final diagnoses:   [K08.89] Odontalgia

## 2025-01-21 ENCOUNTER — PATIENT MESSAGE (OUTPATIENT)
Age: 75
End: 2025-01-21

## 2025-01-27 RX ORDER — PRAVASTATIN SODIUM 20 MG
20 TABLET ORAL DAILY
Qty: 90 TABLET | Refills: 3 | Status: SHIPPED | OUTPATIENT
Start: 2025-01-27

## 2025-01-27 NOTE — TELEPHONE ENCOUNTER
Requesting medication refill. Please approve or deny this request.    Rx requested:  Requested Prescriptions     Pending Prescriptions Disp Refills    pravastatin (PRAVACHOL) 20 MG tablet [Pharmacy Med Name: PRAVASTATIN TABS 20MG] 90 tablet 3     Sig: TAKE 1 TABLET DAILY         Last Office Visit:   12/12/2024      Next Visit Date:  Future Appointments   Date Time Provider Department Center   1/28/2025 11:00 AM David Betts DO [facility] DEP   3/10/2025  2:30 PM Marcell Zazueta MD [facility] DEP   6/16/2025 10:00 AM [facility]   12/11/2025 12:15 PM HolidayPolo DO [facility]

## 2025-01-28 ENCOUNTER — OFFICE VISIT (OUTPATIENT)
Age: 75
End: 2025-01-28
Payer: MEDICARE

## 2025-01-28 ENCOUNTER — HOSPITAL ENCOUNTER (OUTPATIENT)
Dept: CARDIOLOGY | Facility: HOSPITAL | Age: 75
Discharge: HOME | End: 2025-01-28
Payer: MEDICARE

## 2025-01-28 ENCOUNTER — APPOINTMENT (OUTPATIENT)
Dept: PREADMISSION TESTING | Facility: HOSPITAL | Age: 75
End: 2025-01-28
Payer: MEDICARE

## 2025-01-28 ENCOUNTER — PRE-ADMISSION TESTING (OUTPATIENT)
Dept: PREADMISSION TESTING | Facility: HOSPITAL | Age: 75
End: 2025-01-28
Payer: MEDICARE

## 2025-01-28 VITALS
SYSTOLIC BLOOD PRESSURE: 131 MMHG | BODY MASS INDEX: 30.25 KG/M2 | RESPIRATION RATE: 16 BRPM | WEIGHT: 223.33 LBS | DIASTOLIC BLOOD PRESSURE: 85 MMHG | HEIGHT: 72 IN | HEART RATE: 70 BPM | OXYGEN SATURATION: 99 %

## 2025-01-28 VITALS
HEIGHT: 72 IN | HEART RATE: 71 BPM | DIASTOLIC BLOOD PRESSURE: 78 MMHG | TEMPERATURE: 97.6 F | RESPIRATION RATE: 18 BRPM | OXYGEN SATURATION: 96 % | WEIGHT: 217 LBS | BODY MASS INDEX: 29.39 KG/M2 | SYSTOLIC BLOOD PRESSURE: 132 MMHG

## 2025-01-28 DIAGNOSIS — M17.11 UNILATERAL PRIMARY OSTEOARTHRITIS, RIGHT KNEE: ICD-10-CM

## 2025-01-28 DIAGNOSIS — Z01.818 ENCOUNTER FOR PRE-OPERATIVE EXAMINATION: Primary | ICD-10-CM

## 2025-01-28 DIAGNOSIS — N18.31 STAGE 3A CHRONIC KIDNEY DISEASE (HCC): ICD-10-CM

## 2025-01-28 DIAGNOSIS — M17.11 PRIMARY OSTEOARTHRITIS OF RIGHT KNEE: ICD-10-CM

## 2025-01-28 DIAGNOSIS — I48.0 PAROXYSMAL ATRIAL FIBRILLATION (HCC): ICD-10-CM

## 2025-01-28 LAB
ALBUMIN SERPL BCP-MCNC: 4.6 G/DL (ref 3.4–5)
ALP SERPL-CCNC: 41 U/L (ref 33–136)
ALT SERPL W P-5'-P-CCNC: 24 U/L (ref 10–52)
ANION GAP SERPL CALC-SCNC: 12 MMOL/L (ref 10–20)
APTT PPP: 43 SECONDS (ref 27–38)
AST SERPL W P-5'-P-CCNC: 21 U/L (ref 9–39)
BASOPHILS # BLD AUTO: 0.04 X10*3/UL (ref 0–0.1)
BASOPHILS NFR BLD AUTO: 0.8 %
BILIRUB SERPL-MCNC: 0.5 MG/DL (ref 0–1.2)
BUN SERPL-MCNC: 24 MG/DL (ref 6–23)
CALCIUM SERPL-MCNC: 9.7 MG/DL (ref 8.6–10.3)
CHLORIDE SERPL-SCNC: 105 MMOL/L (ref 98–107)
CO2 SERPL-SCNC: 27 MMOL/L (ref 21–32)
CREAT SERPL-MCNC: 1.41 MG/DL (ref 0.5–1.3)
EGFRCR SERPLBLD CKD-EPI 2021: 52 ML/MIN/1.73M*2
EOSINOPHIL # BLD AUTO: 0.15 X10*3/UL (ref 0–0.4)
EOSINOPHIL NFR BLD AUTO: 3 %
ERYTHROCYTE [DISTWIDTH] IN BLOOD BY AUTOMATED COUNT: 13.2 % (ref 11.5–14.5)
EST. AVERAGE GLUCOSE BLD GHB EST-MCNC: 85 MG/DL
GLUCOSE SERPL-MCNC: 102 MG/DL (ref 74–99)
HBA1C MFR BLD: 4.6 %
HCT VFR BLD AUTO: 38.2 % (ref 41–52)
HGB BLD-MCNC: 13.1 G/DL (ref 13.5–17.5)
IMM GRANULOCYTES # BLD AUTO: 0.01 X10*3/UL (ref 0–0.5)
IMM GRANULOCYTES NFR BLD AUTO: 0.2 % (ref 0–0.9)
INR PPP: 1.6 (ref 0.9–1.1)
LYMPHOCYTES # BLD AUTO: 1.56 X10*3/UL (ref 0.8–3)
LYMPHOCYTES NFR BLD AUTO: 30.8 %
MCH RBC QN AUTO: 32.6 PG (ref 26–34)
MCHC RBC AUTO-ENTMCNC: 34.3 G/DL (ref 32–36)
MCV RBC AUTO: 95 FL (ref 80–100)
MONOCYTES # BLD AUTO: 0.55 X10*3/UL (ref 0.05–0.8)
MONOCYTES NFR BLD AUTO: 10.9 %
NEUTROPHILS # BLD AUTO: 2.75 X10*3/UL (ref 1.6–5.5)
NEUTROPHILS NFR BLD AUTO: 54.3 %
NRBC BLD-RTO: 0 /100 WBCS (ref 0–0)
PLATELET # BLD AUTO: 186 X10*3/UL (ref 150–450)
POTASSIUM SERPL-SCNC: 4.6 MMOL/L (ref 3.5–5.3)
PROT SERPL-MCNC: 6.8 G/DL (ref 6.4–8.2)
PROTHROMBIN TIME: 17.7 SECONDS (ref 9.8–12.8)
RBC # BLD AUTO: 4.02 X10*6/UL (ref 4.5–5.9)
SODIUM SERPL-SCNC: 139 MMOL/L (ref 136–145)
WBC # BLD AUTO: 5.1 X10*3/UL (ref 4.4–11.3)

## 2025-01-28 PROCEDURE — 85610 PROTHROMBIN TIME: CPT

## 2025-01-28 PROCEDURE — 85025 COMPLETE CBC W/AUTO DIFF WBC: CPT

## 2025-01-28 PROCEDURE — G8417 CALC BMI ABV UP PARAM F/U: HCPCS | Performed by: FAMILY MEDICINE

## 2025-01-28 PROCEDURE — G8427 DOCREV CUR MEDS BY ELIG CLIN: HCPCS | Performed by: FAMILY MEDICINE

## 2025-01-28 PROCEDURE — 36415 COLL VENOUS BLD VENIPUNCTURE: CPT

## 2025-01-28 PROCEDURE — 93010 ELECTROCARDIOGRAM REPORT: CPT | Performed by: INTERNAL MEDICINE

## 2025-01-28 PROCEDURE — 1123F ACP DISCUSS/DSCN MKR DOCD: CPT | Performed by: FAMILY MEDICINE

## 2025-01-28 PROCEDURE — 1036F TOBACCO NON-USER: CPT | Performed by: FAMILY MEDICINE

## 2025-01-28 PROCEDURE — 3075F SYST BP GE 130 - 139MM HG: CPT | Performed by: FAMILY MEDICINE

## 2025-01-28 PROCEDURE — 3078F DIAST BP <80 MM HG: CPT | Performed by: FAMILY MEDICINE

## 2025-01-28 PROCEDURE — 87081 CULTURE SCREEN ONLY: CPT | Mod: ELYLAB

## 2025-01-28 PROCEDURE — 1159F MED LIST DOCD IN RCRD: CPT | Performed by: FAMILY MEDICINE

## 2025-01-28 PROCEDURE — 99214 OFFICE O/P EST MOD 30 MIN: CPT | Performed by: FAMILY MEDICINE

## 2025-01-28 PROCEDURE — 99213 OFFICE O/P EST LOW 20 MIN: CPT | Performed by: FAMILY MEDICINE

## 2025-01-28 PROCEDURE — 1160F RVW MEDS BY RX/DR IN RCRD: CPT | Performed by: FAMILY MEDICINE

## 2025-01-28 PROCEDURE — 83036 HEMOGLOBIN GLYCOSYLATED A1C: CPT | Mod: ELYLAB

## 2025-01-28 PROCEDURE — 80053 COMPREHEN METABOLIC PANEL: CPT

## 2025-01-28 PROCEDURE — 93005 ELECTROCARDIOGRAM TRACING: CPT

## 2025-01-28 PROCEDURE — 3017F COLORECTAL CA SCREEN DOC REV: CPT | Performed by: FAMILY MEDICINE

## 2025-01-28 RX ORDER — ACETAMINOPHEN 325 MG/1
325 TABLET ORAL EVERY 6 HOURS PRN
COMMUNITY

## 2025-01-28 SDOH — ECONOMIC STABILITY: FOOD INSECURITY: WITHIN THE PAST 12 MONTHS, THE FOOD YOU BOUGHT JUST DIDN'T LAST AND YOU DIDN'T HAVE MONEY TO GET MORE.: NEVER TRUE

## 2025-01-28 SDOH — ECONOMIC STABILITY: FOOD INSECURITY: WITHIN THE PAST 12 MONTHS, YOU WORRIED THAT YOUR FOOD WOULD RUN OUT BEFORE YOU GOT MONEY TO BUY MORE.: NEVER TRUE

## 2025-01-28 ASSESSMENT — PATIENT HEALTH QUESTIONNAIRE - PHQ9
2. FEELING DOWN, DEPRESSED OR HOPELESS: NOT AT ALL
SUM OF ALL RESPONSES TO PHQ QUESTIONS 1-9: 0
SUM OF ALL RESPONSES TO PHQ9 QUESTIONS 1 & 2: 0
SUM OF ALL RESPONSES TO PHQ QUESTIONS 1-9: 0
1. LITTLE INTEREST OR PLEASURE IN DOING THINGS: NOT AT ALL

## 2025-01-28 NOTE — H&P (VIEW-ONLY)
Subjective  Tru Manzo 1950 is a 74 y.o. male who presents today with:  Chief Complaint   Patient presents with   • Pre-op Exam     Patient is here for medical clearance for a total right knee replacement on 02/11/2025 with Dr. Cristóbal Nevarez. He states he had his pre admission testing this morning.        HPI  I have reviewed HPI and agree with above.  Tru is a 74-year-old male with a history of mixed hyperlipidemia, paroxysmal atrial fibrillation, seizure episode x 1.  He also has a history of chronic gout as well as chronic kidney disease stage IIIa.  He is scheduled for a right total knee February 11.  He has already seen his cardiologist for cardiac clearance and this has been granted.  The plan for his anticoagulation is to stop 2 days prior to surgery.  He did have his presurgical testing done this morning.  His labs were reviewed and reveal no changes in any of his blood work.  He has a chronic anemia that has not changed.  His kidney function is stable.  Clotting factors are as you would expect being on Xarelto.    Review of Systems   All other systems reviewed and are negative.      Past Medical History:   Diagnosis Date   • BBB (bundle branch block)     RIGHT   • CAD (coronary artery disease) 01/16/2015   • Cardiomyopathy, nonischemic (HCC) 08/23/2018   • Chronic kidney disease    • GERD (gastroesophageal reflux disease)    • Hyperlipidemia    • Hypertension    • Paroxysmal atrial fibrillation (HCC) 05/18/2018   • Pelvic fracture (HCC)     REMOTE   • Polycystic kidney disease 12/14/2023   • Retinal artery occlusion 01/16/2015   • Seizures (HCC)     History of years ago     Past Surgical History:   Procedure Laterality Date   • ANGIOPLASTY     • ANKLE FRACTURE SURGERY Right    • [facility]ACT [facility] Left 07[facility]18/2016    [facility]   • CATARACT REMOVAL Right 08/12/2016    [facility]   • COLONOSCOPY N/A 10/5/2020    COLONOSCOPY WITH POLYPECTOMY performed by Scot Moody MD at [facility]   •  FRACTURE SURGERY     • PELVIC FRACTURE SURGERY     • ROTATOR CUFF REPAIR       Social History     Socioeconomic History   • Marital status:      Spouse name: Not on file   • Number of children: Not on file   • Years of education: Not on file   • Highest education level: Not on file   Occupational History   • Not on file   Tobacco Use   • Smoking status: Former     Current packs/day: 0.00     Average packs/day: 1 pack/day for 40.7 years (40.7 ttl pk-yrs)     Types: Cigarettes     Start date:      Quit date: 2007     Years since quittin.3   • Smokeless tobacco: Never   Vaping Use   • Vaping status: Never Used   Substance and Sexual Activity   • Alcohol use: Yes   • Drug use: No   • Sexual activity: Not Currently   Other Topics Concern   • Not on file   Social History Narrative   • Not on file     Social Determinants of Health     Financial Resource Strain: Low Risk  (3/15/2023)    Overall Financial Resource Strain (CARDIA)    • Difficulty of Paying Living Expenses: Not hard at all   Food Insecurity: No Food Insecurity (2025)    Hunger Vital Sign    • Worried About Running Out of Food in the Last Year: Never true    • Ran Out of Food in the Last Year: Never true   Transportation Needs: No Transportation Needs (2025)    PRAPARE - Transportation    • Lack of Transportation (Medical): No    • Lack of Transportation (Non-Medical): No   Physical Activity: Sufficiently Active (2024)    Exercise Vital Sign    • Days of Exercise per Week: 6 days    • Minutes of Exercise per Session: 30 min   Stress: Not on file   Social Connections: Not on file   Intimate Partner Violence: Not on file   Housing Stability: Low Risk  (2025)    Housing Stability Vital Sign    • Unable to Pay for Housing in the Last Year: No    • Number of Times Moved in the Last Year: 0    • Homeless in the Last Year: No     Family History   Problem Relation Age of Onset   • Heart Disease Mother    • Kidney Disease Father   "    No Known Allergies  Current Outpatient Medications   Medication Sig Dispense Refill   • pravastatin (PRAVACHOL) 20 MG tablet TAKE 1 TABLET DAILY 90 tablet 3   • allopurinol (ZYLOPRIM) 300 MG tablet TAKE 1 TABLET DAILY 90 tablet 3   • metoprolol succinate (TOPROL XL) 50 MG extended release tablet TAKE 1 TABLET DAILY 90 tablet 3   • omeprazole (PRILOSEC) 40 MG delayed release capsule Take 1 capsule by mouth daily 90 capsule 3   • rivaroxaban (XARELTO) 20 MG TABS tablet Take 1 tablet by mouth daily (with breakfast) 90 tablet 3   • levETIRAcetam (KEPPRA) 250 MG tablet TAKE ONE TABLET BY MOUTH TWO TIMES A  tablet 0     No current facility-administered medications for this visit.       PMH, Surgical Hx, Family Hx, and Social Hx reviewed and updated.  Health Maintenance reviewed.    Objective  Vitals:    01/28/25 1050   BP: 132/78   Site: Left Upper Arm   Position: Sitting   Cuff Size: Medium Adult   Pulse: 71   Resp: 18   Temp: 97.6 °F (36.4 °C)   TempSrc: Infrared   SpO2: 96%   Weight: 98.4 kg (217 lb)   Height: 1.829 m (6')     BP Readings from Last 3 Encounters:   01/28/25 132/78   12/12/24 120/80   01/18/24 126/80     Wt Readings from Last 3 Encounters:   01/28/25 98.4 kg (217 lb)   12/12/24 101.6 kg (224 lb)   01/18/24 100.7 kg (222 lb 0.1 oz)       No results found for: \"LABA1C\"  Lab Results   Component Value Date    CREATININE 1.34 (H) 02/20/2023     Lab Results   Component Value Date    ALT 14 01/12/2023    AST 14 01/12/2023     Lab Results   Component Value Date    CHOL 120 01/19/2023    TRIG 75 01/19/2023    HDL 40 01/19/2023          Physical Exam  Vitals reviewed.   Constitutional:       Appearance: Normal appearance. He is normal weight.   HENT:      Head: Normocephalic and atraumatic.      Right Ear: Tympanic membrane normal.      Left Ear: Tympanic membrane normal.      Nose: Nose normal.      Mouth/Throat:      Mouth: Mucous membranes are dry.   Eyes:      Extraocular Movements: Extraocular " movements intact.      Conjunctiva/sclera: Conjunctivae normal.      Pupils: Pupils are equal, round, and reactive to light.   Cardiovascular:      Rate and Rhythm: Normal rate and regular rhythm.      Pulses: Normal pulses.      Heart sounds: Normal heart sounds.   Pulmonary:      Effort: Pulmonary effort is normal.      Breath sounds: Normal breath sounds.   Abdominal:      General: Abdomen is flat. Bowel sounds are normal.      Palpations: Abdomen is soft.   Musculoskeletal:         General: Normal range of motion.      Cervical back: Normal range of motion and neck supple.   Skin:     General: Skin is warm and dry.   Neurological:      General: No focal deficit present.      Mental Status: He is alert.   Psychiatric:         Mood and Affect: Mood normal.         Behavior: Behavior normal.         Thought Content: Thought content normal.         Judgment: Judgment normal.       Assessment & Plan   1. Encounter for pre-operative examination -his lab work is unchanged.  All of his chronic medical conditions are completely stable.  He has been cardiac cleared by his cardiologist.  I believe he is low risk for this procedure.  He already has instructions on how to discontinue his Xarelto.  A letter will be sent to his surgeon.  2. Primary osteoarthritis of right knee -surgery is scheduled for February 11  3. Paroxysmal atrial fibrillation (HCC)-he is currently in sinus rhythm.  He is anticoagulated and rate controlled.  4. Stage 3a chronic kidney disease (HCC)-his BUN and creatinine are stable.     No orders of the defined types were placed in this encounter.    No orders of the defined types were placed in this encounter.    There are no discontinued medications.  No follow-ups on file.        Reviewed with the patient: current clinical status, medications, activities and diet.     Side effects, adverse effects of the medication prescribed today, as well as treatment plan/ rationale and result expectations have been  discussed with the patient who expresses understanding and desires to proceed.    Close follow up to evaluate treatment results and for coordination of care.  I have reviewed the patient's medical history in detail and updated the computerized patient record.    SOL SPANGLER, DO

## 2025-01-28 NOTE — PREPROCEDURE INSTRUCTIONS
KNEE & HIP JOINT REPLACEMENT PRE-OPERATIVE INSTRUCTIONS     You will receive notification one business day prior to your surgery to confirm your arrival time and any additional information between 2 P.M. - 5 P.M. It is important that you answer your phone and/or check your messages during this time.     You may see in [employer] your surgery start time change several times even up to the day before your procedure. Please disregard those times and only follow the time given by the  who will be notifying you via phone and not a text.     Please arrive at your scheduled time to avoid delay or cancelled surgery.     Please enter the building through either the Main Entrance in front of the hospital or from the Parking Garage Walk Way Bridge. From the parking garage, which is free, take the 2nd Floor Walkway Bridge into the hospital and check in at the [facility] Outpatient Desk as you enter the hospital directly in front of you. If you enter through the Main Entrance take the elevator off the lobby on the right labeled “A” to the 2nd floor and check in at the [facility] Outpatient Surgery desk as you exit the elevator. Wheelchairs are available for use if using the Main Entrance.     Handicap parking in the land lot, in front of hospital by main entrance, wheelchairs are available at this entrance     ?   INSTRUCTIONS:   Please contact your doctor’s office, who is doing procedure, about any changes in your health, bad cold, fever, sore throat, or COVID within last 4 weeks     Talk to your surgeon for instructions if you should stop your aspirin, blood thinner, diabetes medicines, weight loss medications, multivitamins or over the counter supplements since many surgeons have you adjust or stop these medications prior to procedure. The doctor’s office may have you contact the prescribing doctor for medication adjustments for your surgery.     If you take certain medications like Beta Blocker or Anti-Seizure  medication, you may have to take them the morning of procedure with a sip of water. If this is the case your surgeons office should let you know, and the PAT nurses will follow up when they speak to you to verify you are aware.     If not staying overnight after your surgery, and you are receiving any type of anesthesia with your surgery, you must have an adult (age 18 or older) immediately available to drive you home after surgery or your procedure will be cancelled. You may be discharged home after surgery per an Uber, Lyft, Taxi or any other transportation service as long as the responsible adult (18 or older) is in the vehicle with you at time of discharge. The  of these transportation services is not responsible for your care and cannot be consider a responsible adult. We also highly recommend you have someone stay with you for the entire day and night of your surgery.     All jewelry and piercings must be removed. If you are unable to remove an item or have a dermal piercing, please be sure to tell the nurse when you arrive for surgery.     Nail polish must be removed off one finger of each hand     Make-up or other beauty products (lotion, deodorant, hairspray, perfume, etc.) must be removed or not used for day of surgery.     Avoid smoking, consuming alcohol, or any medical or recreational drug use for 24 hours before surgery.     Do not wear contacts to hospital, bring your glasses and a case     Leave valuables at home except photo ID, insurance card and any co-payment that has been requested by hospital.     For adults who are unable to consent or make medical decisions for themselves, a legal guardian or Power of  must accompany them to the surgery center. If this is not possible, please call [phone] to make additional arrangements.  If there is any guardianship or legal Power of  paperwork, please bring them the day of surgery.     Wear comfortable, loose fitting clothing into  the procedure.  While your admitted you are asked to bring short sleeve shirts, shorts (loose like pajamas, sweat shorts), and tennis shoes/sneakers.  No slip on shoes.     Please bring your 2-Wheeled Walker with you the day of surgery and the [facility] Booklet that was given to you during your PAT appointment.     The nurse practitioners, , , physical therapist, and occupational therapist will all discuss and work with you during your stay to help with discharge, physical therapy and any other needs. They also may discuss a program called Meds to Beds, where postoperative medications prescribed to you after surgery will be filled and delivered to your beside before you leave, so that you do not need to stop at the pharmacy on the way home    If you use a CPAP or BIPAP, please make sure the PAT nurse was able to get the settings from you, if not please inform the nurses the day of surgery of your settings you use at home.     Additional instructions about eating and drinking before surgery:     Do not eat any solid foods?or drink anything after midnight the night prior to your procedure. Milk, nutritional drinks/supplements, and infant formula are considered solid foods.  This also includes no gum, candy, lozenges, ice, or any other oral consumption.     CHG SOAP & ORAL RINSE   In regards to bathing, please follow the instructions explained to you at the PAT appointment about taking a showering with the CHG soap the 5 nights prior to your surgery.       During your PAT Appointment you were given [employer] CHG Wash Soap (bottles of bubble gum pink soap) to use before procedure.  Begin using the CHG soap 5 days before your scheduled surgery.  Be sure to sleep on clean sheets - change your sheets the first night you do this cleansing process (you don't not need to change them every night).     CHG SOAP INSTRUCTIONS:  Begin using the CHG soap 5 days prior to your scheduled  surgery.  You will wash and rinse your face and genitals with normal soap.  The night before surgery is the ONLY TIME you use the CHG SOAP for your hair, and do NOT use conditioner after washing with the CHG Soap.  For the rest of the showers the 5 days prior to surgery you will use normal shampoo.  Hair extensions should be removed.  Then apply CHG soap solution to a clean wet washcloth.     Turn the water off or move away from the water spray to avoid premature rinsing of the CHG soap as you apply it.   Avoid getting the CHG soap in your ears, eyes, and mouth.  Apply the CHG soap to entire body from the neck down EXCEPT your face and genitals.  Allow the CHG soap to sit for 3 minutes on your skin.  Then turn on water and rinse the CHG solution off your body completely.    DO NOT wash with regular soap after you have used the CHG soap   Pat yourself dry with a clean, fresh-laundered towel when you get out of the shower and clean Pj's  DO NOT apply any powders, deodorants, or lotions after shower   Be aware the CHG soap will stain fabrics if you wash them with bleach after use.   Make sure to pay special attention to cleaning area(s) where your incision(s) will be located. Avoid scrubbing your skin to hard.  You will repeat this same process steps 1-12 for each shower you take prior to surgery.  The morning of  the surgery is the fifth day.      ORAL RINSE   You will receive a call or notification from your pharmacy to  a prescription prior to procedure.  Be sure to  the prescription oral rinse from your local pharmacy.   You will be using the oral rinse the night before and the morning of surgery.    Take a cap full of the solution, 15mL   Swish (gargle if you can) the mouthwash in your mouth for at least 30 seconds, (DO NOT SWALLOW), and spit out   After the oral CHG rinse, do not rinse your mouth with water, drink, or eat.      If you have to take a medication the morning of surgery as instructed by  your surgeon- please take that medication with a sip of water prior to doing the oral rinse the day of surgery.       ?If you have any questions or concerns, please call Pre-Admission Testing at [phone] or your Physician’s office   Dr. Cristóbal Nevarez  [phone]  [facility] General Line: [phone]

## 2025-01-28 NOTE — PROGRESS NOTES
Conjunctivae normal.      Pupils: Pupils are equal, round, and reactive to light.   Cardiovascular:      Rate and Rhythm: Normal rate and regular rhythm.      Pulses: Normal pulses.      Heart sounds: Normal heart sounds.   Pulmonary:      Effort: Pulmonary effort is normal.      Breath sounds: Normal breath sounds.   Abdominal:      General: Abdomen is flat. Bowel sounds are normal.      Palpations: Abdomen is soft.   Musculoskeletal:         General: Normal range of motion.      Cervical back: Normal range of motion and neck supple.   Skin:     General: Skin is warm and dry.   Neurological:      General: No focal deficit present.      Mental Status: He is alert.   Psychiatric:         Mood and Affect: Mood normal.         Behavior: Behavior normal.         Thought Content: Thought content normal.         Judgment: Judgment normal.       Assessment & Plan   1. Encounter for pre-operative examination -his lab work is unchanged.  All of his chronic medical conditions are completely stable.  He has been cardiac cleared by his cardiologist.  I believe he is low risk for this procedure.  He already has instructions on how to discontinue his Xarelto.  A letter will be sent to his surgeon.  2. Primary osteoarthritis of right knee -surgery is scheduled for February 11  3. Paroxysmal atrial fibrillation (HCC)-he is currently in sinus rhythm.  He is anticoagulated and rate controlled.  4. Stage 3a chronic kidney disease (HCC)-his BUN and creatinine are stable.     No orders of the defined types were placed in this encounter.    No orders of the defined types were placed in this encounter.    There are no discontinued medications.  No follow-ups on file.        Reviewed with the patient: current clinical status, medications, activities and diet.     Side effects, adverse effects of the medication prescribed today, as well as treatment plan/ rationale and result expectations have been discussed with the patient who expresses

## 2025-01-29 LAB — STAPHYLOCOCCUS SPEC CULT: NORMAL

## 2025-01-31 DIAGNOSIS — Z01.818 PRE-OPERATIVE CLEARANCE: Primary | ICD-10-CM

## 2025-01-31 LAB
ATRIAL RATE: 61 BPM
P AXIS: 46 DEGREES
PR INTERVAL: 260 MS
Q ONSET: 190 MS
QRS COUNT: 11 BEATS
QRS DURATION: 148 MS
QT INTERVAL: 442 MS
QTC CALCULATION(BAZETT): 444 MS
QTC FREDERICIA: 444 MS
R AXIS: -73 DEGREES
T AXIS: -18 DEGREES
T OFFSET: 411 MS
VENTRICULAR RATE: 61 BPM

## 2025-01-31 RX ORDER — CHLORHEXIDINE GLUCONATE ORAL RINSE 1.2 MG/ML
15 SOLUTION DENTAL DAILY
Qty: 30 ML | Refills: 0 | Status: SHIPPED | OUTPATIENT
Start: 2025-01-31 | End: 2025-02-02

## 2025-02-02 NOTE — PROGRESS NOTES
Tru Manzo is here today for a pre-op visit. He is scheduled for right total knee arthroplasty on 2/11/25.     This is a pre-op visit to discuss the risks and benefits of surgery. The risks and benefits were fully explained to the patient. The patient wishes to proceed.     With any surgery, infection is a risk; usually 1-2%. It can become higher for individuals with diabetes, rheumatoid arthritis, previous surgery, individuals on oral steroids or immunosuppressants, or obese individuals. All of these issues were properly addressed and considered. Reassured all sterile techniques would be followed.  Severe infections may require removal of any prosthesis (if applicable). Cadaver/allograft tissues may be used for certain surgeries.  Although extremely rare, there is a less than 0.5% risk of disease transmission.     The patient will be given preop antibiotics. It was also explained to the patient that there will be some blood loss during the procedure, but that blood transfusion is usually not necessary.  If applicable, it is also noted that a tourniquet may be applied to lower the amount of blood loss during the case.    Pulmonary embolism and blood clots were also discussed with the patient. These can have fatal complications. It is explained to the patient that for certain surgeries the use of NAYELI hose, foot pumps, incentive spirometers, quick mobility and the use of blood thinners/Aspirin is the standard preventative care.     It was explained that surgery is often used to decrease pain, provide stability, and improve strength but individuals will have varying results postoperatively. There can be variation in motion and a risk of stiffness. It is also stated that occasionally we will have to manipulate an extremity if pain and stiffness persist. We also discussed there are limitations with some motions after certain surgeries.     Fracture, though rare, may also occur intraoperatively. There is also  the possibility of nerve or vascular injuries as well. There is potential for continued numbness or tingling. The benefits of anesthesia were explained to the patient.     All of the patient's questions were answered.     Results/Imaging: Previous x-rays show near bone-on-bone articulation medially with varus alignment.     Assessment: Moderate to severe right knee DJD with valgus alignment     Plan: I have personally reviewed the OARRS report for this patient. This report is scanned into the electronic medical record. I have considered the risks of abuse, dependence, addiction, and diversion. The patient's current pain level is 5.  Post operative pain medication given in the hospital.  The patient will not begin taking this until after surgery.     They will follow up 2 weeks post op.    Scribe Attestation  By signing my name below, IAretha Scribe   attest that this documentation has been prepared under the direction and in the presence of Cristóbal Nevarez MD.

## 2025-02-03 ENCOUNTER — OFFICE VISIT (OUTPATIENT)
Dept: ORTHOPEDIC SURGERY | Facility: CLINIC | Age: 75
End: 2025-02-03
Payer: MEDICARE

## 2025-02-03 ENCOUNTER — PREP FOR PROCEDURE (OUTPATIENT)
Dept: ORTHOPEDIC SURGERY | Facility: CLINIC | Age: 75
End: 2025-02-03

## 2025-02-03 ENCOUNTER — EVALUATION (OUTPATIENT)
Dept: PHYSICAL THERAPY | Facility: CLINIC | Age: 75
End: 2025-02-03
Payer: MEDICARE

## 2025-02-03 DIAGNOSIS — M17.11 UNILATERAL PRIMARY OSTEOARTHRITIS, RIGHT KNEE: Primary | ICD-10-CM

## 2025-02-03 DIAGNOSIS — M17.11 PRIMARY OSTEOARTHRITIS OF RIGHT KNEE: Primary | ICD-10-CM

## 2025-02-03 PROCEDURE — 97161 PT EVAL LOW COMPLEX 20 MIN: CPT | Mod: GP

## 2025-02-03 PROCEDURE — 1036F TOBACCO NON-USER: CPT | Performed by: ORTHOPAEDIC SURGERY

## 2025-02-03 PROCEDURE — 1159F MED LIST DOCD IN RCRD: CPT | Performed by: ORTHOPAEDIC SURGERY

## 2025-02-03 PROCEDURE — 99211 OFF/OP EST MAY X REQ PHY/QHP: CPT | Performed by: ORTHOPAEDIC SURGERY

## 2025-02-03 RX ORDER — ACETAMINOPHEN 325 MG/1
650 TABLET ORAL ONCE
Status: CANCELLED | OUTPATIENT
Start: 2025-02-03 | End: 2025-02-03

## 2025-02-03 RX ORDER — SODIUM CHLORIDE, SODIUM LACTATE, POTASSIUM CHLORIDE, CALCIUM CHLORIDE 600; 310; 30; 20 MG/100ML; MG/100ML; MG/100ML; MG/100ML
20 INJECTION, SOLUTION INTRAVENOUS CONTINUOUS
Status: CANCELLED | OUTPATIENT
Start: 2025-02-11 | End: 2025-02-12

## 2025-02-03 RX ORDER — CELECOXIB 50 MG/1
200 CAPSULE ORAL ONCE
Status: CANCELLED | OUTPATIENT
Start: 2025-02-03 | End: 2025-02-03

## 2025-02-03 RX ORDER — OXYCODONE HYDROCHLORIDE 5 MG/1
10 TABLET ORAL ONCE
Status: CANCELLED | OUTPATIENT
Start: 2025-02-03 | End: 2025-02-03

## 2025-02-03 RX ORDER — CEFAZOLIN SODIUM 2 G/100ML
2 INJECTION, SOLUTION INTRAVENOUS ONCE
Status: CANCELLED | OUTPATIENT
Start: 2025-02-11 | End: 2025-02-03

## 2025-02-03 RX ORDER — TRANEXAMIC ACID 650 MG/1
1950 TABLET ORAL
Status: CANCELLED | OUTPATIENT
Start: 2025-02-03

## 2025-02-03 ASSESSMENT — ENCOUNTER SYMPTOMS
DEPRESSION: 0
OCCASIONAL FEELINGS OF UNSTEADINESS: 0
LOSS OF SENSATION IN FEET: 0

## 2025-02-03 ASSESSMENT — PATIENT HEALTH QUESTIONNAIRE - PHQ9
2. FEELING DOWN, DEPRESSED OR HOPELESS: NOT AT ALL
SUM OF ALL RESPONSES TO PHQ9 QUESTIONS 1 AND 2: 0
1. LITTLE INTEREST OR PLEASURE IN DOING THINGS: NOT AT ALL

## 2025-02-03 NOTE — PROGRESS NOTES
"Patient Name: Tru Manzo  MRN: [id number]  Time Calculation  Start Time: 0920  Stop Time: 0955  Time Calculation (min): 35 min  PT Evaluation Time Entry  PT Evaluation (Low) Time Entry: 25                      Current Problem  1. Unilateral primary osteoarthritis, right knee          Insurance    MEDICARE/ AARP 2410($0 USED) COPAY 0 (0),COVERAGE 80/100 OOP 0 AARP TO FOLLOW MEDICARE,NO AUTH REQ       Subjective   General: Pt is a 74 y.o. male presenting to clinic for pre-operative evaluation. Pt is scheduled for R TKA on 2/11 with Dr. Grady Nevarez. Pain began about a year ago with insidious onset. Pt has overall not had much in the way of pain, but does have restricted ROM and has consistent issues with grinding/mechanical symptoms. Also notes he has developed \"knock knees\" over the last few years. He is generally very active and likes to walk, stay busy around the home and go swimming at the gym. Main goal after surgery is to reduce dysfunction in the knee.        home setup      Ranch style home, 1 story no basement. 3STE with RHR           Pertinent medical hx:  Pelvic fracture (2008), R NUVIA, Nephritis,m Polycystic kidney disease,  pacemaker placement, seizure disorder    Precautions:    Pain:    Current: 0/10      Reviewed medical screening form with pt and medical screening assessed    Imaging:   R knee XR 10/23/24:     \"FINDINGS:  Advanced osteoarthritis right knee worst laterally. No fracture or  lesion.\"  Objective   Knee Musculoskeletal Exam    Range of Motion    Right      Active extension: -10      Active flexion: 115    Left      Active extension: 0      Active flexion: 120    Strength    Right      Extension: 5/5.       Flexion: 5/5.     Left      Extension: 5/5.       Flexion: 5/5.           Treatment: See HEP below    EDUCATION/HEP:    Initial evaluation completed. Discussed objective findings and goals of skilled care. Provided information regarding upcoming surgical intervention including " handouts outlining procedure, appropriate post-operative exercises, signs and symptoms of infection, and post-operative management of pain. Instructed patient in proper gait mechanics on level surface and stairs using FWW, WBAT. Answered all questions for patient.      Assessment:    Pt is a 74 y.o. male presenting to clinic for pre-operative evaluation. Pt is scheduled for R TKA on 2/11 with Dr. Grady Nevarez. On exam pt is demonstrating good strength of the knee bilaterally. Noted Rom restriction of the R knee into both flexion and extension. No significant tenderness, pt denies any significant functional restrictions currently. Spent today's visit reviewing information of the procedure, post-operative expectations for recovery and rehabilitation. Discussed risks and benefits from a rehab perspective. Answered any and all patient questions. Once cleared by the physician recommend skilled PT after the procedure to maximize strength and ROM in order to restore PLOF. Anticipate good prognosis, will re-assess following the procedure.       Clinical Presentation: Stable    Level of Complexity: Low    Goals:  Pt to be IND with HEP  2. Pt to be IND with identifying signs of infection  3. Pt to be IND with proper method of transfers with FWW/SPC   4. Pt to be IND with recalling appropriate methods of post operative pain control     Plan  Pt will be placed on hold for therapy until after completion of surgical procedure. Upon return to therapy per MD discretion, patient's status will be re-evaluated.   Plans to attend this clinic for outpatient PT if so ordered

## 2025-02-10 ENCOUNTER — ANESTHESIA EVENT (OUTPATIENT)
Dept: OPERATING ROOM | Facility: HOSPITAL | Age: 75
End: 2025-02-10
Payer: MEDICARE

## 2025-02-10 ENCOUNTER — TELEPHONE (OUTPATIENT)
Dept: ORTHOPEDIC SURGERY | Facility: CLINIC | Age: 75
End: 2025-02-10
Payer: MEDICARE

## 2025-02-10 PROCEDURE — RXMED WILLOW AMBULATORY MEDICATION CHARGE

## 2025-02-10 NOTE — DISCHARGE INSTRUCTIONS
Total Knee Replacement  Discharge Instructions    To prevent Clot formation, you have been placed on the following medication:  Xarelto 10 mg once daily for the first three postoperative days starting on 2/12/25. On 2/15/25 can resume taking xarelto 20 mg once daily as taking prior to surgery.  Surgical Site Care:  .Change dressing once a day and PRN (as needed). Apply 4 x 4 sponge and light tape. If glue present, leave open to air.  You may leave wound open to air after initial dressing removal, if wound is clean, dry and intact  If Aquacel Ag dressing is present, do not remove dressing for 7 days, unless heavily saturated. If heavily saturated, remove dressing and start using instructions above  Staples will be removed on post-operative day 14 and steri-strips applied  Showering is permitted starting POD1 if waterproof Aquacel dressing is present or when the incision is covered with 4 x 4 and Tegaderm waterproof dressing  Until all areas of incision are healed.    Physical Therapy:  Weight Bearing Status:  WBAT  Precautions, Per Physical Therapy Handout  Pain Medications  You were given  oxycodone  Wean off pain medications as you deem appropriate as long as pain is under control  Cold packs/Ice packs/Machine  May be used 3 times daily for 15-30 minutes as necessary  Be sure to have a barrier (cloth, clothing, towel) between the site and the ice pack to prevent frostbite  Contact [facility] office if  Increased redness, swelling, drainage of any kind, and/or pain to surgery site.  As well as new onset fevers and or chills.  These could signify an infection.  Calf or thigh tenderness to touch as well as increased swelling or redness.  This could signify a clot formation.  Numbness or tingling to an area around the incision site or below the incision site (toes).  Any rash appears, increased  or new onset nausea/vomiting occur.  This may indicate a reaction to a medication.  Phone #  [phone].  Follow up with Surgeon   I acknowledge that I have received nayeli hose and understand the instructions on how and when to wear them (on during the day, off at night)   Discharging RN who has gone over instructions and acknowledges nayeli hose have been received   Ice 5 times a day for 20 minutes each session to operative extremity for two weeks.   NAYELI hose to be worn for three weeks. Can be removed for skin care and hygiene.   Incentive spirometer 10 times every hour while awake for two weeks.

## 2025-02-10 NOTE — TELEPHONE ENCOUNTER
2/10/25 - revieved message on 2/6/25 from MA that patient had questions in regards to cold therapy units.   Call patient on 2/10/25 and explained cost of $160.00.  Explained how the unit works and that it is not mandatory to have and more of a conveneience item so that you are not running back and forth a lot changing out the ice packs.   Patient declined at this time.   Explained that if he or his wife change their minds, they can reach out to us and that we carry them at our [place] office all the time.   All questions answered at this time for DME purposes

## 2025-02-11 ENCOUNTER — APPOINTMENT (OUTPATIENT)
Dept: RADIOLOGY | Facility: HOSPITAL | Age: 75
End: 2025-02-11
Payer: MEDICARE

## 2025-02-11 ENCOUNTER — HOSPITAL ENCOUNTER (OUTPATIENT)
Facility: HOSPITAL | Age: 75
Discharge: HOME | End: 2025-02-12
Attending: ORTHOPAEDIC SURGERY | Admitting: ORTHOPAEDIC SURGERY
Payer: MEDICARE

## 2025-02-11 ENCOUNTER — ANESTHESIA (OUTPATIENT)
Dept: OPERATING ROOM | Facility: HOSPITAL | Age: 75
End: 2025-02-11
Payer: MEDICARE

## 2025-02-11 DIAGNOSIS — M17.11 UNILATERAL PRIMARY OSTEOARTHRITIS, RIGHT KNEE: Primary | ICD-10-CM

## 2025-02-11 PROBLEM — Z96.651 TOTAL KNEE REPLACEMENT STATUS, RIGHT: Status: ACTIVE | Noted: 2025-02-11

## 2025-02-11 PROBLEM — N18.30 CHRONIC RENAL IMPAIRMENT, STAGE 3 (MODERATE) (MULTI): Status: ACTIVE | Noted: 2025-02-11

## 2025-02-11 PROBLEM — Z95.0 PACEMAKER: Status: ACTIVE | Noted: 2025-02-11

## 2025-02-11 LAB
INR PPP: 1.1 (ref 0.9–1.1)
PROTHROMBIN TIME: 12.3 SECONDS (ref 9.8–12.8)

## 2025-02-11 PROCEDURE — 85610 PROTHROMBIN TIME: CPT | Performed by: ORTHOPAEDIC SURGERY

## 2025-02-11 PROCEDURE — 3700000001 HC GENERAL ANESTHESIA TIME - INITIAL BASE CHARGE: Performed by: ORTHOPAEDIC SURGERY

## 2025-02-11 PROCEDURE — 2500000001 HC RX 250 WO HCPCS SELF ADMINISTERED DRUGS (ALT 637 FOR MEDICARE OP): Performed by: REGISTERED NURSE

## 2025-02-11 PROCEDURE — 2500000004 HC RX 250 GENERAL PHARMACY W/ HCPCS (ALT 636 FOR OP/ED): Performed by: ANESTHESIOLOGY

## 2025-02-11 PROCEDURE — 2500000005 HC RX 250 GENERAL PHARMACY W/O HCPCS: Performed by: ORTHOPAEDIC SURGERY

## 2025-02-11 PROCEDURE — C1713 ANCHOR/SCREW BN/BN,TIS/BN: HCPCS | Performed by: ORTHOPAEDIC SURGERY

## 2025-02-11 PROCEDURE — 2500000004 HC RX 250 GENERAL PHARMACY W/ HCPCS (ALT 636 FOR OP/ED): Performed by: NURSE ANESTHETIST, CERTIFIED REGISTERED

## 2025-02-11 PROCEDURE — 2780000003 HC OR 278 NO HCPCS: Performed by: ORTHOPAEDIC SURGERY

## 2025-02-11 PROCEDURE — 7100000011 HC EXTENDED STAY RECOVERY HOURLY - NURSING UNIT

## 2025-02-11 PROCEDURE — 7100000001 HC RECOVERY ROOM TIME - INITIAL BASE CHARGE: Performed by: ORTHOPAEDIC SURGERY

## 2025-02-11 PROCEDURE — 3600000018 HC OR TIME - INITIAL BASE CHARGE - PROCEDURE LEVEL SIX: Performed by: ORTHOPAEDIC SURGERY

## 2025-02-11 PROCEDURE — 3600000017 HC OR TIME - EACH INCREMENTAL 1 MINUTE - PROCEDURE LEVEL SIX: Performed by: ORTHOPAEDIC SURGERY

## 2025-02-11 PROCEDURE — 36415 COLL VENOUS BLD VENIPUNCTURE: CPT | Performed by: ORTHOPAEDIC SURGERY

## 2025-02-11 PROCEDURE — 2500000001 HC RX 250 WO HCPCS SELF ADMINISTERED DRUGS (ALT 637 FOR MEDICARE OP): Performed by: ORTHOPAEDIC SURGERY

## 2025-02-11 PROCEDURE — 2720000007 HC OR 272 NO HCPCS: Performed by: ORTHOPAEDIC SURGERY

## 2025-02-11 PROCEDURE — 2500000004 HC RX 250 GENERAL PHARMACY W/ HCPCS (ALT 636 FOR OP/ED): Performed by: ORTHOPAEDIC SURGERY

## 2025-02-11 PROCEDURE — 73560 X-RAY EXAM OF KNEE 1 OR 2: CPT | Mod: RT

## 2025-02-11 PROCEDURE — 27447 TOTAL KNEE ARTHROPLASTY: CPT | Performed by: ORTHOPAEDIC SURGERY

## 2025-02-11 PROCEDURE — 99221 1ST HOSP IP/OBS SF/LOW 40: CPT | Performed by: REGISTERED NURSE

## 2025-02-11 PROCEDURE — 7100000002 HC RECOVERY ROOM TIME - EACH INCREMENTAL 1 MINUTE: Performed by: ORTHOPAEDIC SURGERY

## 2025-02-11 PROCEDURE — 20985 CPTR-ASST DIR MS PX: CPT | Performed by: ORTHOPAEDIC SURGERY

## 2025-02-11 PROCEDURE — 2500000002 HC RX 250 W HCPCS SELF ADMINISTERED DRUGS (ALT 637 FOR MEDICARE OP, ALT 636 FOR OP/ED): Mod: MUE | Performed by: ORTHOPAEDIC SURGERY

## 2025-02-11 PROCEDURE — 73560 X-RAY EXAM OF KNEE 1 OR 2: CPT | Mod: RIGHT SIDE | Performed by: RADIOLOGY

## 2025-02-11 PROCEDURE — C1776 JOINT DEVICE (IMPLANTABLE): HCPCS | Performed by: ORTHOPAEDIC SURGERY

## 2025-02-11 PROCEDURE — 3700000002 HC GENERAL ANESTHESIA TIME - EACH INCREMENTAL 1 MINUTE: Performed by: ORTHOPAEDIC SURGERY

## 2025-02-11 DEVICE — IMPLANTABLE DEVICE: Type: IMPLANTABLE DEVICE | Site: KNEE | Status: FUNCTIONAL

## 2025-02-11 DEVICE — IMPLANTABLE DEVICE
Type: IMPLANTABLE DEVICE | Site: KNEE | Status: NON-FUNCTIONAL
Brand: NEXGEN®

## 2025-02-11 DEVICE — IMPLANTABLE DEVICE
Type: IMPLANTABLE DEVICE | Site: KNEE | Status: NON-FUNCTIONAL
Brand: PERSONA™

## 2025-02-11 DEVICE — IMPLANTABLE DEVICE
Type: IMPLANTABLE DEVICE | Site: KNEE | Status: FUNCTIONAL
Brand: BIOMET® BONE CEMENT R

## 2025-02-11 DEVICE — IMPLANTABLE DEVICE: Type: IMPLANTABLE DEVICE | Site: KNEE | Status: NON-FUNCTIONAL

## 2025-02-11 DEVICE — IMPLANTABLE DEVICE
Type: IMPLANTABLE DEVICE | Site: KNEE | Status: FUNCTIONAL
Brand: PERSONA®

## 2025-02-11 RX ORDER — OXYCODONE HYDROCHLORIDE 5 MG/1
5 TABLET ORAL EVERY 4 HOURS PRN
Status: DISCONTINUED | OUTPATIENT
Start: 2025-02-11 | End: 2025-02-12 | Stop reason: HOSPADM

## 2025-02-11 RX ORDER — LISINOPRIL 5 MG/1
5 TABLET ORAL DAILY
Status: DISCONTINUED | OUTPATIENT
Start: 2025-02-11 | End: 2025-02-12 | Stop reason: HOSPADM

## 2025-02-11 RX ORDER — MIDAZOLAM HYDROCHLORIDE 5 MG/ML
INJECTION INTRAMUSCULAR; INTRAVENOUS AS NEEDED
Status: DISCONTINUED | OUTPATIENT
Start: 2025-02-11 | End: 2025-02-11

## 2025-02-11 RX ORDER — FENTANYL CITRATE 50 UG/ML
50 INJECTION, SOLUTION INTRAMUSCULAR; INTRAVENOUS EVERY 5 MIN PRN
Status: DISCONTINUED | OUTPATIENT
Start: 2025-02-11 | End: 2025-02-11 | Stop reason: HOSPADM

## 2025-02-11 RX ORDER — TRANEXAMIC ACID 650 MG/1
1950 TABLET ORAL ONCE
Status: COMPLETED | OUTPATIENT
Start: 2025-02-12 | End: 2025-02-12

## 2025-02-11 RX ORDER — ACETAMINOPHEN 325 MG/1
650 TABLET ORAL EVERY 6 HOURS SCHEDULED
Status: DISCONTINUED | OUTPATIENT
Start: 2025-02-11 | End: 2025-02-12 | Stop reason: HOSPADM

## 2025-02-11 RX ORDER — NALOXONE HYDROCHLORIDE 1 MG/ML
0.2 INJECTION INTRAMUSCULAR; INTRAVENOUS; SUBCUTANEOUS EVERY 5 MIN PRN
Status: DISCONTINUED | OUTPATIENT
Start: 2025-02-11 | End: 2025-02-12 | Stop reason: HOSPADM

## 2025-02-11 RX ORDER — OXYCODONE HYDROCHLORIDE 5 MG/1
10 TABLET ORAL EVERY 4 HOURS PRN
Status: DISCONTINUED | OUTPATIENT
Start: 2025-02-11 | End: 2025-02-12 | Stop reason: HOSPADM

## 2025-02-11 RX ORDER — POLYETHYLENE GLYCOL 3350 17 G/17G
17 POWDER, FOR SOLUTION ORAL DAILY
Status: DISCONTINUED | OUTPATIENT
Start: 2025-02-11 | End: 2025-02-12 | Stop reason: HOSPADM

## 2025-02-11 RX ORDER — MORPHINE SULFATE 2 MG/ML
2 INJECTION, SOLUTION INTRAMUSCULAR; INTRAVENOUS EVERY 2 HOUR PRN
Status: DISCONTINUED | OUTPATIENT
Start: 2025-02-11 | End: 2025-02-12 | Stop reason: HOSPADM

## 2025-02-11 RX ORDER — ONDANSETRON HYDROCHLORIDE 2 MG/ML
4 INJECTION, SOLUTION INTRAVENOUS ONCE AS NEEDED
Status: DISCONTINUED | OUTPATIENT
Start: 2025-02-11 | End: 2025-02-11 | Stop reason: HOSPADM

## 2025-02-11 RX ORDER — BISACODYL 5 MG
10 TABLET, DELAYED RELEASE (ENTERIC COATED) ORAL DAILY PRN
Status: DISCONTINUED | OUTPATIENT
Start: 2025-02-11 | End: 2025-02-12 | Stop reason: HOSPADM

## 2025-02-11 RX ORDER — CEFAZOLIN SODIUM 2 G/100ML
2 INJECTION, SOLUTION INTRAVENOUS ONCE
Status: COMPLETED | OUTPATIENT
Start: 2025-02-11 | End: 2025-02-11

## 2025-02-11 RX ORDER — OXYCODONE HYDROCHLORIDE 5 MG/1
10 TABLET ORAL EVERY 6 HOURS PRN
Status: DISCONTINUED | OUTPATIENT
Start: 2025-02-11 | End: 2025-02-11

## 2025-02-11 RX ORDER — PROCHLORPERAZINE 25 MG/1
25 SUPPOSITORY RECTAL EVERY 12 HOURS PRN
Status: DISCONTINUED | OUTPATIENT
Start: 2025-02-11 | End: 2025-02-12 | Stop reason: HOSPADM

## 2025-02-11 RX ORDER — PHENYLEPHRINE HCL IN 0.9% NACL 1 MG/10 ML
SYRINGE (ML) INTRAVENOUS AS NEEDED
Status: DISCONTINUED | OUTPATIENT
Start: 2025-02-11 | End: 2025-02-11

## 2025-02-11 RX ORDER — ROPIVACAINE/EPI/CLONIDINE/KET 2.46-0.005
SYRINGE (ML) INJECTION AS NEEDED
Status: DISCONTINUED | OUTPATIENT
Start: 2025-02-11 | End: 2025-02-11 | Stop reason: HOSPADM

## 2025-02-11 RX ORDER — ASPIRIN 81 MG/1
81 TABLET ORAL 2 TIMES DAILY
Status: DISCONTINUED | OUTPATIENT
Start: 2025-02-11 | End: 2025-02-11

## 2025-02-11 RX ORDER — ALLOPURINOL 300 MG/1
300 TABLET ORAL DAILY
Status: DISCONTINUED | OUTPATIENT
Start: 2025-02-11 | End: 2025-02-12 | Stop reason: HOSPADM

## 2025-02-11 RX ORDER — SODIUM CHLORIDE, SODIUM LACTATE, POTASSIUM CHLORIDE, CALCIUM CHLORIDE 600; 310; 30; 20 MG/100ML; MG/100ML; MG/100ML; MG/100ML
20 INJECTION, SOLUTION INTRAVENOUS CONTINUOUS
Status: ACTIVE | OUTPATIENT
Start: 2025-02-11 | End: 2025-02-12

## 2025-02-11 RX ORDER — KETOROLAC TROMETHAMINE 30 MG/ML
15 INJECTION, SOLUTION INTRAMUSCULAR; INTRAVENOUS EVERY 6 HOURS
Status: DISCONTINUED | OUTPATIENT
Start: 2025-02-11 | End: 2025-02-12 | Stop reason: HOSPADM

## 2025-02-11 RX ORDER — ONDANSETRON HYDROCHLORIDE 2 MG/ML
4 INJECTION, SOLUTION INTRAVENOUS EVERY 8 HOURS PRN
Status: DISCONTINUED | OUTPATIENT
Start: 2025-02-11 | End: 2025-02-12 | Stop reason: HOSPADM

## 2025-02-11 RX ORDER — TRANEXAMIC ACID 650 MG/1
1950 TABLET ORAL ONCE
Status: COMPLETED | OUTPATIENT
Start: 2025-02-11 | End: 2025-02-11

## 2025-02-11 RX ORDER — OXYCODONE HYDROCHLORIDE 5 MG/1
10 TABLET ORAL ONCE
Status: COMPLETED | OUTPATIENT
Start: 2025-02-11 | End: 2025-02-11

## 2025-02-11 RX ORDER — FENTANYL CITRATE 50 UG/ML
INJECTION, SOLUTION INTRAMUSCULAR; INTRAVENOUS AS NEEDED
Status: DISCONTINUED | OUTPATIENT
Start: 2025-02-11 | End: 2025-02-11

## 2025-02-11 RX ORDER — SODIUM CHLORIDE, SODIUM LACTATE, POTASSIUM CHLORIDE, CALCIUM CHLORIDE 600; 310; 30; 20 MG/100ML; MG/100ML; MG/100ML; MG/100ML
100 INJECTION, SOLUTION INTRAVENOUS CONTINUOUS
Status: DISCONTINUED | OUTPATIENT
Start: 2025-02-11 | End: 2025-02-11 | Stop reason: HOSPADM

## 2025-02-11 RX ORDER — ONDANSETRON HYDROCHLORIDE 2 MG/ML
INJECTION, SOLUTION INTRAVENOUS AS NEEDED
Status: DISCONTINUED | OUTPATIENT
Start: 2025-02-11 | End: 2025-02-11

## 2025-02-11 RX ORDER — PROCHLORPERAZINE EDISYLATE 5 MG/ML
10 INJECTION INTRAMUSCULAR; INTRAVENOUS EVERY 6 HOURS PRN
Status: DISCONTINUED | OUTPATIENT
Start: 2025-02-11 | End: 2025-02-12 | Stop reason: HOSPADM

## 2025-02-11 RX ORDER — ROCURONIUM BROMIDE 10 MG/ML
INJECTION, SOLUTION INTRAVENOUS AS NEEDED
Status: DISCONTINUED | OUTPATIENT
Start: 2025-02-11 | End: 2025-02-11

## 2025-02-11 RX ORDER — METOPROLOL SUCCINATE 50 MG/1
50 TABLET, EXTENDED RELEASE ORAL DAILY
Status: DISCONTINUED | OUTPATIENT
Start: 2025-02-12 | End: 2025-02-12 | Stop reason: HOSPADM

## 2025-02-11 RX ORDER — CEFAZOLIN SODIUM 2 G/100ML
2 INJECTION, SOLUTION INTRAVENOUS EVERY 8 HOURS
Status: COMPLETED | OUTPATIENT
Start: 2025-02-11 | End: 2025-02-12

## 2025-02-11 RX ORDER — TRANEXAMIC ACID 650 MG/1
1950 TABLET ORAL
Status: COMPLETED | OUTPATIENT
Start: 2025-02-11 | End: 2025-02-11

## 2025-02-11 RX ORDER — SODIUM CHLORIDE, SODIUM LACTATE, POTASSIUM CHLORIDE, CALCIUM CHLORIDE 600; 310; 30; 20 MG/100ML; MG/100ML; MG/100ML; MG/100ML
50 INJECTION, SOLUTION INTRAVENOUS CONTINUOUS
Status: DISCONTINUED | OUTPATIENT
Start: 2025-02-11 | End: 2025-02-12 | Stop reason: HOSPADM

## 2025-02-11 RX ORDER — PROCHLORPERAZINE MALEATE 5 MG
10 TABLET ORAL EVERY 6 HOURS PRN
Status: DISCONTINUED | OUTPATIENT
Start: 2025-02-11 | End: 2025-02-12 | Stop reason: HOSPADM

## 2025-02-11 RX ORDER — MEPERIDINE HYDROCHLORIDE 25 MG/ML
12.5 INJECTION INTRAMUSCULAR; INTRAVENOUS; SUBCUTANEOUS EVERY 10 MIN PRN
Status: DISCONTINUED | OUTPATIENT
Start: 2025-02-11 | End: 2025-02-11 | Stop reason: HOSPADM

## 2025-02-11 RX ORDER — PRAVASTATIN SODIUM 20 MG/1
20 TABLET ORAL NIGHTLY
Status: DISCONTINUED | OUTPATIENT
Start: 2025-02-11 | End: 2025-02-12 | Stop reason: HOSPADM

## 2025-02-11 RX ORDER — ROPIVACAINE HYDROCHLORIDE 5 MG/ML
20 INJECTION, SOLUTION EPIDURAL; INFILTRATION; PERINEURAL ONCE
Status: COMPLETED | OUTPATIENT
Start: 2025-02-11 | End: 2025-02-11

## 2025-02-11 RX ORDER — CELECOXIB 200 MG/1
200 CAPSULE ORAL ONCE
Status: COMPLETED | OUTPATIENT
Start: 2025-02-11 | End: 2025-02-11

## 2025-02-11 RX ORDER — OXYCODONE HYDROCHLORIDE 5 MG/1
5 TABLET ORAL EVERY 4 HOURS PRN
Status: DISCONTINUED | OUTPATIENT
Start: 2025-02-11 | End: 2025-02-11

## 2025-02-11 RX ORDER — SODIUM CHLORIDE, SODIUM LACTATE, POTASSIUM CHLORIDE, CALCIUM CHLORIDE 600; 310; 30; 20 MG/100ML; MG/100ML; MG/100ML; MG/100ML
50 INJECTION, SOLUTION INTRAVENOUS CONTINUOUS
Status: ACTIVE | OUTPATIENT
Start: 2025-02-11 | End: 2025-02-12

## 2025-02-11 RX ORDER — LEVETIRACETAM 500 MG/1
250 TABLET ORAL 2 TIMES DAILY
Status: DISCONTINUED | OUTPATIENT
Start: 2025-02-11 | End: 2025-02-12 | Stop reason: HOSPADM

## 2025-02-11 RX ORDER — HYDROMORPHONE HYDROCHLORIDE 1 MG/ML
INJECTION, SOLUTION INTRAMUSCULAR; INTRAVENOUS; SUBCUTANEOUS AS NEEDED
Status: DISCONTINUED | OUTPATIENT
Start: 2025-02-11 | End: 2025-02-11

## 2025-02-11 RX ORDER — WATER 1 ML/ML
IRRIGANT IRRIGATION AS NEEDED
Status: DISCONTINUED | OUTPATIENT
Start: 2025-02-11 | End: 2025-02-11 | Stop reason: HOSPADM

## 2025-02-11 RX ORDER — ONDANSETRON 4 MG/1
4 TABLET, ORALLY DISINTEGRATING ORAL EVERY 8 HOURS PRN
Status: DISCONTINUED | OUTPATIENT
Start: 2025-02-11 | End: 2025-02-12 | Stop reason: HOSPADM

## 2025-02-11 RX ORDER — CYCLOBENZAPRINE HCL 10 MG
10 TABLET ORAL 3 TIMES DAILY PRN
Status: DISCONTINUED | OUTPATIENT
Start: 2025-02-11 | End: 2025-02-12 | Stop reason: HOSPADM

## 2025-02-11 RX ORDER — ACETAMINOPHEN 325 MG/1
650 TABLET ORAL ONCE
Status: COMPLETED | OUTPATIENT
Start: 2025-02-11 | End: 2025-02-11

## 2025-02-11 RX ORDER — LIDOCAINE HYDROCHLORIDE 20 MG/ML
INJECTION, SOLUTION INFILTRATION; PERINEURAL AS NEEDED
Status: DISCONTINUED | OUTPATIENT
Start: 2025-02-11 | End: 2025-02-11

## 2025-02-11 RX ORDER — PROPOFOL 10 MG/ML
INJECTION, EMULSION INTRAVENOUS AS NEEDED
Status: DISCONTINUED | OUTPATIENT
Start: 2025-02-11 | End: 2025-02-11

## 2025-02-11 RX ADMIN — Medication 100 MCG: at 12:03

## 2025-02-11 RX ADMIN — PROPOFOL 200 MG: 10 INJECTION, EMULSION INTRAVENOUS at 11:01

## 2025-02-11 RX ADMIN — Medication 100 MCG: at 11:21

## 2025-02-11 RX ADMIN — HYDROMORPHONE HYDROCHLORIDE 0.5 MG: 1 INJECTION, SOLUTION INTRAMUSCULAR; INTRAVENOUS; SUBCUTANEOUS at 13:31

## 2025-02-11 RX ADMIN — ACETAMINOPHEN 650 MG: 325 TABLET ORAL at 18:50

## 2025-02-11 RX ADMIN — LIDOCAINE HYDROCHLORIDE 80 MG: 20 INJECTION, SOLUTION INFILTRATION; PERINEURAL at 11:01

## 2025-02-11 RX ADMIN — CEFAZOLIN SODIUM 2 G: 2 INJECTION, SOLUTION INTRAVENOUS at 18:51

## 2025-02-11 RX ADMIN — TRANEXAMIC ACID 1950 MG: 650 TABLET ORAL at 07:59

## 2025-02-11 RX ADMIN — TRANEXAMIC ACID 1950 MG: 650 TABLET ORAL at 18:51

## 2025-02-11 RX ADMIN — HYDROMORPHONE HYDROCHLORIDE 1 MG: 1 INJECTION, SOLUTION INTRAMUSCULAR; INTRAVENOUS; SUBCUTANEOUS at 11:01

## 2025-02-11 RX ADMIN — POVIDONE-IODINE 1 APPLICATION: 5 SOLUTION TOPICAL at 07:35

## 2025-02-11 RX ADMIN — SUGAMMADEX 200 MG: 100 INJECTION, SOLUTION INTRAVENOUS at 12:40

## 2025-02-11 RX ADMIN — DEXAMETHASONE SODIUM PHOSPHATE: 10 INJECTION INTRAMUSCULAR; INTRAVENOUS at 09:19

## 2025-02-11 RX ADMIN — FENTANYL CITRATE 50 MCG: 50 INJECTION, SOLUTION INTRAMUSCULAR; INTRAVENOUS at 12:43

## 2025-02-11 RX ADMIN — SODIUM CHLORIDE, POTASSIUM CHLORIDE, SODIUM LACTATE AND CALCIUM CHLORIDE 50 ML/HR: 600; 310; 30; 20 INJECTION, SOLUTION INTRAVENOUS at 07:34

## 2025-02-11 RX ADMIN — CELECOXIB 200 MG: 200 CAPSULE ORAL at 07:59

## 2025-02-11 RX ADMIN — SODIUM CHLORIDE, POTASSIUM CHLORIDE, SODIUM LACTATE AND CALCIUM CHLORIDE 20 ML/HR: 600; 310; 30; 20 INJECTION, SOLUTION INTRAVENOUS at 08:00

## 2025-02-11 RX ADMIN — PRAVASTATIN SODIUM 20 MG: 20 TABLET ORAL at 20:42

## 2025-02-11 RX ADMIN — HYDROMORPHONE HYDROCHLORIDE 0.5 MG: 1 INJECTION, SOLUTION INTRAMUSCULAR; INTRAVENOUS; SUBCUTANEOUS at 13:38

## 2025-02-11 RX ADMIN — CEFAZOLIN SODIUM 2 G: 2 INJECTION, SOLUTION INTRAVENOUS at 11:03

## 2025-02-11 RX ADMIN — ONDANSETRON 4 MG: 2 INJECTION, SOLUTION INTRAMUSCULAR; INTRAVENOUS at 12:20

## 2025-02-11 RX ADMIN — DEXAMETHASONE SODIUM PHOSPHATE 8 MG: 4 INJECTION, SOLUTION INTRAMUSCULAR; INTRAVENOUS at 11:08

## 2025-02-11 RX ADMIN — Medication 100 MCG: at 11:17

## 2025-02-11 RX ADMIN — ROPIVACAINE HYDROCHLORIDE 100 MG: 5 INJECTION, SOLUTION EPIDURAL; INFILTRATION; PERINEURAL at 09:19

## 2025-02-11 RX ADMIN — PROCHLORPERAZINE EDISYLATE 10 MG: 5 INJECTION INTRAMUSCULAR; INTRAVENOUS at 16:45

## 2025-02-11 RX ADMIN — OXYCODONE 10 MG: 5 TABLET ORAL at 08:04

## 2025-02-11 RX ADMIN — MIDAZOLAM 5 MG: 5 INJECTION INTRAMUSCULAR; INTRAVENOUS at 09:19

## 2025-02-11 RX ADMIN — KETOROLAC TROMETHAMINE 15 MG: 30 INJECTION, SOLUTION INTRAMUSCULAR at 20:41

## 2025-02-11 RX ADMIN — ACETAMINOPHEN 650 MG: 325 TABLET ORAL at 07:59

## 2025-02-11 RX ADMIN — FENTANYL CITRATE 50 MCG: 50 INJECTION, SOLUTION INTRAMUSCULAR; INTRAVENOUS at 12:47

## 2025-02-11 RX ADMIN — LEVETIRACETAM 250 MG: 500 TABLET, FILM COATED ORAL at 20:42

## 2025-02-11 RX ADMIN — ROCURONIUM BROMIDE 50 MG: 10 SOLUTION INTRAVENOUS at 11:01

## 2025-02-11 RX ADMIN — Medication 100 MCG: at 11:07

## 2025-02-11 RX ADMIN — SODIUM CHLORIDE, POTASSIUM CHLORIDE, SODIUM LACTATE AND CALCIUM CHLORIDE 50 ML/HR: 600; 310; 30; 20 INJECTION, SOLUTION INTRAVENOUS at 07:59

## 2025-02-11 SDOH — HEALTH STABILITY: PHYSICAL HEALTH
HOW OFTEN DO YOU NEED TO HAVE SOMEONE HELP YOU WHEN YOU READ INSTRUCTIONS, PAMPHLETS, OR OTHER WRITTEN MATERIAL FROM YOUR DOCTOR OR PHARMACY?: NEVER

## 2025-02-11 SDOH — ECONOMIC STABILITY: HOUSING INSECURITY: IN THE LAST 12 MONTHS, WAS THERE A TIME WHEN YOU WERE NOT ABLE TO PAY THE MORTGAGE OR RENT ON TIME?: NO

## 2025-02-11 SDOH — SOCIAL STABILITY: SOCIAL INSECURITY: ABUSE: ADULT

## 2025-02-11 SDOH — SOCIAL STABILITY: SOCIAL INSECURITY
WITHIN THE LAST YEAR, HAVE YOU BEEN KICKED, HIT, SLAPPED, OR OTHERWISE PHYSICALLY HURT BY YOUR PARTNER OR EX-PARTNER?: NO

## 2025-02-11 SDOH — ECONOMIC STABILITY: TRANSPORTATION INSECURITY: IN THE PAST 12 MONTHS, HAS LACK OF TRANSPORTATION KEPT YOU FROM MEDICAL APPOINTMENTS OR FROM GETTING MEDICATIONS?: NO

## 2025-02-11 SDOH — SOCIAL STABILITY: SOCIAL INSECURITY: WITHIN THE LAST YEAR, HAVE YOU BEEN AFRAID OF YOUR PARTNER OR EX-PARTNER?: NO

## 2025-02-11 SDOH — ECONOMIC STABILITY: HOUSING INSECURITY: AT ANY TIME IN THE PAST 12 MONTHS, WERE YOU HOMELESS OR LIVING IN A SHELTER (INCLUDING NOW)?: NO

## 2025-02-11 SDOH — SOCIAL STABILITY: SOCIAL INSECURITY: WERE YOU ABLE TO COMPLETE ALL THE BEHAVIORAL HEALTH SCREENINGS?: YES

## 2025-02-11 SDOH — HEALTH STABILITY: MENTAL HEALTH: HOW OFTEN DO YOU HAVE SIX OR MORE DRINKS ON ONE OCCASION?: NEVER

## 2025-02-11 SDOH — ECONOMIC STABILITY: FOOD INSECURITY: WITHIN THE PAST 12 MONTHS, THE FOOD YOU BOUGHT JUST DIDN'T LAST AND YOU DIDN'T HAVE MONEY TO GET MORE.: NEVER TRUE

## 2025-02-11 SDOH — HEALTH STABILITY: PHYSICAL HEALTH: ON AVERAGE, HOW MANY DAYS PER WEEK DO YOU ENGAGE IN MODERATE TO STRENUOUS EXERCISE (LIKE A BRISK WALK)?: 0 DAYS

## 2025-02-11 SDOH — SOCIAL STABILITY: SOCIAL INSECURITY
WITHIN THE LAST YEAR, HAVE YOU BEEN RAPED OR FORCED TO HAVE ANY KIND OF SEXUAL ACTIVITY BY YOUR PARTNER OR EX-PARTNER?: NO

## 2025-02-11 SDOH — SOCIAL STABILITY: SOCIAL INSECURITY: DO YOU FEEL UNSAFE GOING BACK TO THE PLACE WHERE YOU ARE LIVING?: NO

## 2025-02-11 SDOH — ECONOMIC STABILITY: FOOD INSECURITY: HOW HARD IS IT FOR YOU TO PAY FOR THE VERY BASICS LIKE FOOD, HOUSING, MEDICAL CARE, AND HEATING?: NOT HARD AT ALL

## 2025-02-11 SDOH — SOCIAL STABILITY: SOCIAL INSECURITY: DO YOU FEEL ANYONE HAS EXPLOITED OR TAKEN ADVANTAGE OF YOU FINANCIALLY OR OF YOUR PERSONAL PROPERTY?: NO

## 2025-02-11 SDOH — ECONOMIC STABILITY: FOOD INSECURITY: WITHIN THE PAST 12 MONTHS, YOU WORRIED THAT YOUR FOOD WOULD RUN OUT BEFORE YOU GOT THE MONEY TO BUY MORE.: NEVER TRUE

## 2025-02-11 SDOH — ECONOMIC STABILITY: HOUSING INSECURITY: DO YOU FEEL UNSAFE GOING BACK TO THE PLACE WHERE YOU LIVE?: NO

## 2025-02-11 SDOH — SOCIAL STABILITY: SOCIAL INSECURITY: HAVE YOU HAD ANY THOUGHTS OF HARMING ANYONE ELSE?: NO

## 2025-02-11 SDOH — ECONOMIC STABILITY: INCOME INSECURITY: IN THE PAST 12 MONTHS HAS THE ELECTRIC, GAS, OIL, OR WATER COMPANY THREATENED TO SHUT OFF SERVICES IN YOUR HOME?: NO

## 2025-02-11 SDOH — HEALTH STABILITY: PHYSICAL HEALTH: ON AVERAGE, HOW MANY MINUTES DO YOU ENGAGE IN EXERCISE AT THIS LEVEL?: 0 MIN

## 2025-02-11 SDOH — HEALTH STABILITY: MENTAL HEALTH: HOW OFTEN DO YOU HAVE A DRINK CONTAINING ALCOHOL?: MONTHLY OR LESS

## 2025-02-11 SDOH — SOCIAL STABILITY: SOCIAL INSECURITY: HAVE YOU HAD THOUGHTS OF HARMING ANYONE ELSE?: NO

## 2025-02-11 SDOH — SOCIAL STABILITY: SOCIAL NETWORK
DO YOU BELONG TO ANY CLUBS OR ORGANIZATIONS SUCH AS CHURCH GROUPS, UNIONS, FRATERNAL OR ATHLETIC GROUPS, OR SCHOOL GROUPS?: NO

## 2025-02-11 SDOH — SOCIAL STABILITY: SOCIAL INSECURITY: WITHIN THE LAST YEAR, HAVE YOU BEEN HUMILIATED OR EMOTIONALLY ABUSED IN OTHER WAYS BY YOUR PARTNER OR EX-PARTNER?: NO

## 2025-02-11 SDOH — HEALTH STABILITY: MENTAL HEALTH: CURRENT SMOKER: 0

## 2025-02-11 SDOH — SOCIAL STABILITY: SOCIAL INSECURITY: ARE YOU MARRIED, WIDOWED, DIVORCED, SEPARATED, NEVER MARRIED, OR LIVING WITH A PARTNER?: MARRIED

## 2025-02-11 SDOH — SOCIAL STABILITY: SOCIAL NETWORK: IN A TYPICAL WEEK, HOW MANY TIMES DO YOU TALK ON THE PHONE WITH FAMILY, FRIENDS, OR NEIGHBORS?: TWICE A WEEK

## 2025-02-11 SDOH — SOCIAL STABILITY: SOCIAL NETWORK: HOW OFTEN DO YOU GET TOGETHER WITH FRIENDS OR RELATIVES?: ONCE A WEEK

## 2025-02-11 SDOH — HEALTH STABILITY: MENTAL HEALTH
DO YOU FEEL STRESS - TENSE, RESTLESS, NERVOUS, OR ANXIOUS, OR UNABLE TO SLEEP AT NIGHT BECAUSE YOUR MIND IS TROUBLED ALL THE TIME - THESE DAYS?: NOT AT ALL

## 2025-02-11 SDOH — SOCIAL STABILITY: SOCIAL INSECURITY: ARE YOU OR HAVE YOU BEEN THREATENED OR ABUSED PHYSICALLY, EMOTIONALLY, OR SEXUALLY BY ANYONE?: NO

## 2025-02-11 SDOH — HEALTH STABILITY: MENTAL HEALTH: HOW MANY DRINKS CONTAINING ALCOHOL DO YOU HAVE ON A TYPICAL DAY WHEN YOU ARE DRINKING?: 1 OR 2

## 2025-02-11 SDOH — SOCIAL STABILITY: SOCIAL INSECURITY: DOES ANYONE TRY TO KEEP YOU FROM HAVING/CONTACTING OTHER FRIENDS OR DOING THINGS OUTSIDE YOUR HOME?: NO

## 2025-02-11 SDOH — SOCIAL STABILITY: SOCIAL INSECURITY: ARE THERE ANY APPARENT SIGNS OF INJURIES/BEHAVIORS THAT COULD BE RELATED TO ABUSE/NEGLECT?: NO

## 2025-02-11 SDOH — SOCIAL STABILITY: SOCIAL NETWORK: HOW OFTEN DO YOU ATTEND MEETINGS OF THE CLUBS OR ORGANIZATIONS YOU BELONG TO?: NEVER

## 2025-02-11 SDOH — SOCIAL STABILITY: SOCIAL NETWORK: HOW OFTEN DO YOU ATTEND CHURCH OR RELIGIOUS SERVICES?: MORE THAN 4 TIMES PER YEAR

## 2025-02-11 SDOH — ECONOMIC STABILITY: HOUSING INSECURITY: IN THE PAST 12 MONTHS, HOW MANY TIMES HAVE YOU MOVED WHERE YOU WERE LIVING?: 0

## 2025-02-11 SDOH — SOCIAL STABILITY: SOCIAL INSECURITY: HAS ANYONE EVER THREATENED TO HURT YOUR FAMILY OR YOUR PETS?: NO

## 2025-02-11 ASSESSMENT — PAIN SCALES - GENERAL
PAIN_LEVEL: 0
PAINLEVEL_OUTOF10: 3
PAINLEVEL_OUTOF10: 4
PAINLEVEL_OUTOF10: 0 - NO PAIN
PAINLEVEL_OUTOF10: 3
PAINLEVEL_OUTOF10: 0 - NO PAIN
PAINLEVEL_OUTOF10: 7
PAINLEVEL_OUTOF10: 0 - NO PAIN
PAINLEVEL_OUTOF10: 7

## 2025-02-11 ASSESSMENT — PAIN DESCRIPTION - LOCATION
LOCATION: KNEE
LOCATION: KNEE

## 2025-02-11 ASSESSMENT — LIFESTYLE VARIABLES
HOW OFTEN DO YOU HAVE A DRINK CONTAINING ALCOHOL: MONTHLY OR LESS
PRESCIPTION_ABUSE_PAST_12_MONTHS: NO
SUBSTANCE_ABUSE_PAST_12_MONTHS: NO
AUDIT-C TOTAL SCORE: 1
HOW OFTEN DO YOU HAVE 6 OR MORE DRINKS ON ONE OCCASION: NEVER
SKIP TO QUESTIONS 9-10: 1
HOW MANY STANDARD DRINKS CONTAINING ALCOHOL DO YOU HAVE ON A TYPICAL DAY: 1 OR 2
SKIP TO QUESTIONS 9-10: 1

## 2025-02-11 ASSESSMENT — ACTIVITIES OF DAILY LIVING (ADL)
ADEQUATE_TO_COMPLETE_ADL: YES
GROOMING: INDEPENDENT
JUDGMENT_ADEQUATE_SAFELY_COMPLETE_DAILY_ACTIVITIES: YES
WALKS IN HOME: INDEPENDENT
LACK_OF_TRANSPORTATION: NO
HEARING - LEFT EAR: FUNCTIONAL
LACK_OF_TRANSPORTATION: NO
BATHING: INDEPENDENT
HEARING - RIGHT EAR: FUNCTIONAL
FEEDING YOURSELF: INDEPENDENT
PATIENT'S MEMORY ADEQUATE TO SAFELY COMPLETE DAILY ACTIVITIES?: YES
TOILETING: INDEPENDENT
DRESSING YOURSELF: INDEPENDENT

## 2025-02-11 ASSESSMENT — COGNITIVE AND FUNCTIONAL STATUS - GENERAL
STANDING UP FROM CHAIR USING ARMS: A LOT
MOVING TO AND FROM BED TO CHAIR: A LITTLE
MOVING FROM LYING ON BACK TO SITTING ON SIDE OF FLAT BED WITH BEDRAILS: A LITTLE
PATIENT BASELINE BEDBOUND: NO
DRESSING REGULAR LOWER BODY CLOTHING: A LITTLE
PATIENT BASELINE BEDBOUND: NO
MOBILITY SCORE: 15
MOVING FROM LYING ON BACK TO SITTING ON SIDE OF FLAT BED WITH BEDRAILS: A LITTLE
CLIMB 3 TO 5 STEPS WITH RAILING: A LOT
WALKING IN HOSPITAL ROOM: A LOT
TOILETING: A LITTLE
MOBILITY SCORE: 15
TURNING FROM BACK TO SIDE WHILE IN FLAT BAD: A LITTLE
DRESSING REGULAR LOWER BODY CLOTHING: A LITTLE
WALKING IN HOSPITAL ROOM: A LOT
TOILETING: A LITTLE
STANDING UP FROM CHAIR USING ARMS: A LOT
CLIMB 3 TO 5 STEPS WITH RAILING: A LOT
DAILY ACTIVITIY SCORE: 22
TURNING FROM BACK TO SIDE WHILE IN FLAT BAD: A LITTLE
DAILY ACTIVITIY SCORE: 22
MOVING TO AND FROM BED TO CHAIR: A LITTLE

## 2025-02-11 ASSESSMENT — PAIN DESCRIPTION - DESCRIPTORS
DESCRIPTORS: DULL;BURNING
DESCRIPTORS: BURNING

## 2025-02-11 ASSESSMENT — COLUMBIA-SUICIDE SEVERITY RATING SCALE - C-SSRS
2. HAVE YOU ACTUALLY HAD ANY THOUGHTS OF KILLING YOURSELF?: NO
6. HAVE YOU EVER DONE ANYTHING, STARTED TO DO ANYTHING, OR PREPARED TO DO ANYTHING TO END YOUR LIFE?: NO
1. IN THE PAST MONTH, HAVE YOU WISHED YOU WERE DEAD OR WISHED YOU COULD GO TO SLEEP AND NOT WAKE UP?: NO

## 2025-02-11 ASSESSMENT — PAIN - FUNCTIONAL ASSESSMENT
PAIN_FUNCTIONAL_ASSESSMENT: 0-10

## 2025-02-11 ASSESSMENT — PATIENT HEALTH QUESTIONNAIRE - PHQ9
2. FEELING DOWN, DEPRESSED OR HOPELESS: NOT AT ALL
1. LITTLE INTEREST OR PLEASURE IN DOING THINGS: NOT AT ALL
SUM OF ALL RESPONSES TO PHQ9 QUESTIONS 1 & 2: 0

## 2025-02-11 ASSESSMENT — PAIN DESCRIPTION - ORIENTATION
ORIENTATION: RIGHT
ORIENTATION: RIGHT

## 2025-02-11 NOTE — OP NOTE
TOTAL KNEE ARTHROPLASTY (R) Operative Note    Preop diagnosis: Right knee DJD    Postop diagnosis: Same    Procedure:   Right total knee arthroplasty using a Mitchell size 10 cemented persona posterior stabilized femoral component, size H cemented persona tibial tray with 10 mm persona posterior stabilized polyethylene spacer and 32 mm persona all poly patellar inset button  Patient summary PSI computer alignment system    Surgeon: Cristóbal Nevarez MD  Assistant: Bhargav Martines PA-C      Findings: see procedure details    EBL: minimal    Procedure Details:   CLINICAL NOTE:   The patient had progressive right knee pain, which has been refractory to conservative treatment. Patient has decided to undergo elective total knee replacement using Mitchell PSI cutting guides. The risks, benefits, outcomes and postoperative course were fully explained to the patient. We discussed wear, loosening, infection, blood clot, loss of life, loss of limb, nerve damage, numbness, failure of procedure, stiffness, progressive arthritis and the need for potential revision knee replacement. The patient understands the procedure, risks and benefits, and consents to the surgical intervention.   Patient has pain in the knee that is worsening, the pain increases with activity and with weightbearing, and interferes with daily activities. There is pain with range of motion, limited ROM, crepitus and swelling. X-ray demonstrates joint space narrowing, osteophyte formation and subchondral sclerosis. These symptoms have worsened in spite of medical treatment, therapy and external support over the past three (3) months.     OPERATION AND FINDINGS:   Bhargav Martines PA-C was present throughout the entire case. Given the nature of the disease process and the procedure, a skilled surgical first assistant was necessary during the case. The assistant was necessary to hold retractors and to manipulate the extremity during the procedure. A certified scrub  tech was at the back table managing the instruments and supplies for the surgical case.     The patient was brought to the operating room and placed on the surgical table in supine position whereupon adequate anesthesia was then obtained. Surgical time-out was performed. The patient received preoperative antibiotics. The patient was prepped and draped in the usual sterile manner. The right leg was exsanguinated using Esmarch bandage and a tourniquet was applied at 300 mmHg. A linear midline incision made from the superior pole of the patella to the tibial tubercle identifying the entire extensor mechanism. A medial parapatellar arthrotomy was performed and the patella was everted. Fat pad was excised and once this was complete the patient was noted to have significant hypertrophic synovium with degenerative joint disease and large osteophyte formation. Medial and lateral femoral retractors were inserted. The Mitchell PSI cutting guide was applied and pinned into position. Once this was complete the guide was removed and the distal femoral resection was performed. With the distal femoral section complete the pins were inserted over the distal femur and the multipurpose cutting guide was applied and screwed in position. With the guide in position, the anterior, posterior and chamfer cuts were made without difficulty. Once the femoral cuts were complete a posterior retractor was inserted. The medial and lateral tibial retractors were inserted and the meniscal remnants were excised. The Mitchell PSI cutting pin guide for the tibia was pinned in position. The guide was removed and the tibial resection was made after checking the extramedullary alignment with the extramedullary alignment device. Once the tibial resection was complete, the trial components were inserted. Patellar reaming was performed. Peg holes were drilled in the patella and trial patella was applied. The knee was placed through range of motion and once  appropriate stability was obtained, trial components were removed. All bony surfaces were irrigated with a copious amount of saline irrigation. Components were cemented in position as indicated. All marginal cement was removed and deep drain placed in the knee joint. The capsule was closed using interrupted #1 Vicryl and additional #2 Ethibond sutures. Subcutaneous tissues were closed using 2-0 Vicryl and 4-0 Monocryl. Staples were used for the skin. A dry sterile bulky dressing was applied. Patient tolerated the procedure well and was taken to the PACU in good condition without complication.    Complications:  None; patient tolerated the procedure well.    Disposition: PACU - hemodynamically stable.  Condition: stable         Cristóbal Nevarez  Phone Number: [phone]

## 2025-02-11 NOTE — ANESTHESIA PROCEDURE NOTES
Spinal Block    Patient location during procedure: OR  Start time: 2/11/2025 10:32 AM  End time: 2/11/2025 10:58 AM  Reason for block: primary anesthetic and at surgeon's request  Staffing  Performed: CRNA and attending   Authorized by: Miguel Mccabe MD    Performed by: EMILIANO Ho-CRNA    Preanesthetic Checklist  Completed: patient identified, IV checked, site marked, risks and benefits discussed, surgical consent, monitors and equipment checked, pre-op evaluation, timeout performed and sterile techniques followed  Block Timeout  RN/Licensed healthcare professional reads aloud to the Anesthesia provider and entire team: Patient identity, procedure with side and site, patient position, and as applicable the availability of implants/special equipment/special requirements.  Patient on coagulant treatment: no  Timeout performed at: 2/11/2025 10:32 AM  Spinal Block  Patient position: sitting  Prep: Betadine  Sterility prep: cap, drape, gloves, hand hygiene and mask  Sedation level: light sedation  Patient monitoring: blood pressure, continuous pulse oximetry, EKG, ETCO2 and heart rate  Approach: midline  Vertebral space: L4-5  Needle  Needle type: Quincke   Needle gauge: 22 G  Free flowing CSF: no    Assessment  Block outcome: patient comfortable  Events: procedure abandoned  Procedure assessment: patient tolerated procedure well with no immediate complications  Additional Notes  1% lidocaine skin wheal to level, 20 g introducer with pencan unsuccessful.  Attempts with 1% lidocaine localization at L3-4 and L4-5 and quincke 22G both unsuccessful.  Converted to GETA, discussed plan with patient.

## 2025-02-11 NOTE — PROGRESS NOTES
Physical Therapy                 Therapy Communication Note    Patient Name: Tru Manzo  MRN: [id number]  Department: [facility]  Room: [address] [place], [place] [address]  Today's Date: 2/11/2025     Discipline: Physical Therapy    PT Missed Visit: Yes     Missed Visit Reason: Missed Visit Reason: Other (Comment), Patient refused (PT attempt)    Missed Time: Attempt    Comment: PT 2nd attempt . Pt very lethargic and drowsy. Pt with pale/grey complexion states he's very nauseated and has vomited multiple times.  Pt declined wanting to get out of bed at this time. Spoke to RN who reports she's already given pt meds for nausea and request therapy let pt rest for the evening that he's been very sick post sx after having general anesthesia .  Re-attempt on 2/12.

## 2025-02-11 NOTE — PROGRESS NOTES
Physical Therapy                 Therapy Communication Note    Patient Name: Tru Manzo  MRN: [id number]  Department: [facility]  Room: [address] [place], [place] [address]  Today's Date: 2/11/2025     Discipline: Physical Therapy    PT Missed Visit: Yes     Missed Visit Reason: Missed Visit Reason: Other (Comment) (PT attempt pt very lethargic and nauseated . per RN pt at general and not spinal.  PT to re-attempt.)    Missed Time: Attempt    Comment:

## 2025-02-11 NOTE — ANESTHESIA POSTPROCEDURE EVALUATION
Patient: Tru Manzo    Procedure Summary       Date: 02/11/25 Room / Location: [facility] OR 12 / [facility]    Anesthesia Start: 1032 Anesthesia Stop: 1258    Procedure: TOTAL KNEE ARTHROPLASTY (Right: Knee) Diagnosis:       Unilateral primary osteoarthritis, right knee      (Unilateral primary osteoarthritis, right knee [M17.11])    Surgeons: Cristóbal Nevarez MD Responsible Provider: Miguel Mccabe MD    Anesthesia Type: spinal ASA Status: 3            Anesthesia Type: spinal    Vitals Value Taken Time   /68 02/11/25 1257   Temp 36 02/11/25 1258   Pulse 60 02/11/25 1257   Resp 9 02/11/25 1257   SpO2 97 % 02/11/25 1257   Vitals shown include unfiled device data.    Anesthesia Post Evaluation    Patient location during evaluation: bedside  Patient participation: waiting for patient participation  Level of consciousness: sleepy but conscious  Pain score: 0  Pain management: adequate  Airway patency: patent  Cardiovascular status: acceptable and stable  Respiratory status: acceptable, face mask and oral airway  Hydration status: stable  Postoperative Nausea and Vomiting: none        No notable events documented.

## 2025-02-11 NOTE — PROGRESS NOTES
02/11/25 1603   Discharge Planning   Living Arrangements Spouse/significant other   Support Systems Spouse/significant other;Children;Family members   Assistance Needed none, PTA independent ADLS and IADLS no AD, drives, retired, denies falls. Owns FWW, cane, walk-in shower, shower seat with back, skid resistant mat   Type of Residence Private residence  (1 level home)   Number of Stairs to Enter Residence 3  (with grab bars)   Number of Stairs Within Residence 0   Do you have animals or pets at home? No   Home or Post Acute Services In home services   Type of Home Care Services Home PT;Home OT   Expected Discharge Disposition Home H  ([facility])   Does the patient need discharge transport arranged? No   Financial Resource Strain   How hard is it for you to pay for the very basics like food, housing, medical care, and heating? Not hard   Housing Stability   In the last 12 months, was there a time when you were not able to pay the mortgage or rent on time? N   In the past 12 months, how many times have you moved where you were living? 0   At any time in the past 12 months, were you homeless or living in a shelter (including now)? N   Transportation Needs   In the past 12 months, has lack of transportation kept you from medical appointments or from getting medications? no   In the past 12 months, has lack of transportation kept you from meetings, work, or from getting things needed for daily living? No   Patient Choice   Provider Choice list and CMS website (https://medicare.gov/care-compare#search) for post-acute Quality and Resource Measure Data were provided and reviewed with: Patient;Family   Patient / Family choosing to utilize agency / facility established prior to hospitalization No   Stroke Family Assessment   Stroke Family Assessment Needed No   Intensity of Service   Intensity of Service 0-30 min     Pt is s/p Elective Right total knee arthroplasty 2/11/25 with Dr. Cristóbal Nevarez. PT/OT eval [facility] scores are  currently pending. Pt discharge preference is home with [facility] and agency of choice is [facility]. Demographics verified, best phone is wife Ashlee cell # [phone]. Pt wife works but will be home until next Wednesday to assist as needed. CNP aware of pt [facility] AOC. CT team will monitor case for progression and DC planning.

## 2025-02-11 NOTE — ANESTHESIA PROCEDURE NOTES
Peripheral Block    Patient location during procedure: holding area  Start time: 2/11/2025 9:22 AM  End time: 2/11/2025 9:25 AM  Reason for block: at surgeon's request and post-op pain management  Staffing  Performed: attending   Authorized by: Miguel Mccabe MD    Performed by: Miguel Mccabe MD  Preanesthetic Checklist  Completed: patient identified, IV checked, site marked, risks and benefits discussed, surgical consent, monitors and equipment checked, pre-op evaluation and timeout performed   Timeout performed at: 2/11/2025 9:19 AM  Peripheral Block  Patient position: laying flat  Prep: ChloraPrep and site prepped and draped  Patient monitoring: continuous pulse ox, heart rate and cardiac monitor  Block type: adductor canal  Laterality: right  Injection technique: single-shot  Guidance: ultrasound guided  Local infiltration: lidocaine  Infiltration strength: 1 %  Dose: 1 mL  Needle  Needle localization: anatomical landmarks and ultrasound guidance  Catheter type: open end  Assessment  Injection assessment: negative aspiration for heme, local visualized surrounding nerve on ultrasound, no paresthesia on injection, incremental injection and transient paresthesias  Paresthesia pain: none  Heart rate change: no  Slow fractionated injection: yes  Additional Notes  Pt identified, time out performed, extremity marked.  Patient supine.  Chloroprep.  1% lidocaine 1cc subcutaneous.  Subsartorial technique.  21g needle via lateral, IP approach at mid-thigh.  10cc of 0.5% ropivacaine injected in 5cc aliquots after negative aspiration lateral to femoral artery in the adductor canal.  Images taken, no complications noted

## 2025-02-11 NOTE — PROGRESS NOTES
Patient seen and evaluated upon arrival to floor s/p right total knee arthroplasty. Reports some post op n/v was recently medicated iin PACU. Vitals stable. He is resting comfortably in bed at this time with family present at the bedside.     Weak equal dorsi/plantar flexion bilaterally. Reports slight numbness/tingling to operative extremity. Dressing remains clean, dry, and intact.    PT/OT: Weight-bearing as tolerated to operative extremity with use of walker for assistance with ambulating.    Okay to resume Xarelto 10 mg daily starting POD1 for first three post operative days. Then on 2/15/25 okay to resume 20 mg daily as taking prior to surgery.     Patient plans to return home with  home care upon discharge, orders placed. TCC and SW following for discharge planning.

## 2025-02-11 NOTE — ANESTHESIA PREPROCEDURE EVALUATION
Tru Manzo is a 74 y.o. male here for:    UNICOMPARTMENTAL KNEE VERSES TOTAL KNEE ARTHROPLASTY  With Cristóbal Nevarez MD  Pre-Op Diagnosis Codes:      * Unilateral primary osteoarthritis, right knee [M17.11]    Relevant Problems   Cardiac   (+) Bundle branch block   (+) Pacemaker      Neuro   (+) Seizure disorder (Multi)      /Renal   (+) Chronic renal impairment, stage 3 (moderate) (Multi)      Musculoskeletal   (+) Unilateral primary osteoarthritis, right knee      HEENT   (+) Bilateral sensorineural hearing loss   (+) Sudden hearing loss, left       Lab Results   Component Value Date    HGB 13.1 (L) 01/28/2025    HCT 38.2 (L) 01/28/2025    WBC 5.1 01/28/2025     01/28/2025     01/28/2025    K 4.6 01/28/2025     01/28/2025    CREATININE 1.41 (H) 01/28/2025    BUN 24 (H) 01/28/2025       Social History     Tobacco Use   Smoking Status Former    Types: Cigarettes   Smokeless Tobacco Never       No Known Allergies    Current Outpatient Medications   Medication Instructions    acetaminophen (Tylenol) 325 mg tablet oral, Every 6 hours PRN    acetaminophen (TYLENOL) 325 mg, Every 6 hours PRN    allopurinol (Zyloprim) 300 mg tablet 1 tablet, Daily    cephalexin (KEFLEX) 500 mg, oral, 3 times daily    Lagevrio, EUA, 200 mg capsule TAKE 4 CAPSULES BY MOUTH IN THE MORNING AND 4 CAPSULES IN THE EVENING. DO ALL THIS FOR 5 DAYS.    levETIRAcetam (KEPPRA) 250 mg, oral, 2 times daily    lidocaine (Lidocaine Viscous) 2 % solution Soak gauze or cotton in 1.25 mL of the medication then apply to affected area q4-6 prn pain.  Spit medication, do not swallow.    lisinopril 5 mg tablet oral    metoprolol succinate XL (Toprol-XL) 25 mg 24 hr tablet oral    metoprolol succinate XL (Toprol-XL) 50 mg 24 hr tablet Take by mouth.    naloxone (Narcan) 4 mg/0.1 mL nasal spray Instill 1 spray intranasally for opioid overdose; repeat in 5 minutes if no response.    omeprazole (PriLOSEC) 40 mg DR capsule Take by  mouth.    ondansetron ODT (Zofran-ODT) 4 mg disintegrating tablet     oxyCODONE (Roxicodone) 5 mg immediate release tablet     pravastatin (Pravachol) 20 mg tablet 1 tablet, Daily    predniSONE (Deltasone) 20 mg tablet TAKE 1 TABLET BY MOUTH EVERY DAY FOR 5 DAYS    predniSONE (Deltasone) 20 mg tablet 2 tabs daily x3 days then 1 tablet daily for 2 days then 1/2 tablet for 2 days    promethazine-DM (Phenergan-DM) 6.25-15 mg/5 mL syrup TAKE 5 MILLILITERS BY MOUTH 4 TIMES A DAY AS NEEDED FOR COUGH    sulfamethoxazole-trimethoprim (Bactrim DS) 800-160 mg tablet 1 tablet, oral, 2 times daily    Xarelto 20 mg tablet Take by mouth.       Past Surgical History:   Procedure Laterality Date    ANKLE FRACTURE SURGERY      CARDIAC CATHETERIZATION      CARDIAC PACEMAKER PLACEMENT Left 01/27/2016    Pacemaker Placement    CATARACT EXTRACTION      COLONOSCOPY      EYE SURGERY      HIP ARTHROPLASTY Right     HIP SURGERY  01/27/2016    Hip Surgery    INTRAOCULAR LENS INSERTION      LEG SURGERY Right     femur fracture    ORIF PELVIC FRACTURE      hardware now removed    TONSILLECTOMY      TOTAL SHOULDER ARTHROPLASTY Left        No family history on file.    NPO Details:  NPO/Void Status  Carbohydrate Drink Given Prior to Surgery? : N  Date of Last Liquid: 02/11/25  Time of Last Liquid: 0730  Date of Last Solid: 02/10/25  Time of Last Solid: 2000  Last Intake Type: Clear fluids  Time of Last Void: 0730        Physical Exam    Airway  Mallampati: II  TM distance: >3 FB  Neck ROM: full     Cardiovascular    Dental - normal exam     Pulmonary    Abdominal          Anesthesia Plan    History of general anesthesia?: yes  History of complications of general anesthesia?: no    ASA 3     spinal   (IPACK and saphenous nerve blocks)  The patient is not a current smoker.    intravenous induction   Anesthetic plan and risks discussed with patient.    Plan discussed with CRNA.

## 2025-02-11 NOTE — ANESTHESIA PROCEDURE NOTES
Peripheral Block    Patient location during procedure: holding area  Start time: 2/11/2025 9:19 AM  End time: 2/11/2025 9:22 AM  Reason for block: at surgeon's request and post-op pain management  Staffing  Performed: attending   Authorized by: Miguel Mccabe MD    Performed by: Miguel Mccabe MD  Preanesthetic Checklist  Completed: patient identified, IV checked, site marked, risks and benefits discussed, surgical consent, monitors and equipment checked, pre-op evaluation and timeout performed   Timeout performed at: 2/11/2025 9:19 AM  Peripheral Block  Patient position: laying flat  Prep: ChloraPrep and site prepped and draped  Patient monitoring: continuous pulse ox, heart rate and cardiac monitor  Block type: other  Laterality: right  Injection technique: single-shot  Guidance: ultrasound guided  Local infiltration: lidocaine  Infiltration strength: 1 %  Dose: 3 mL  Needle  Needle localization: anatomical landmarks and ultrasound guidance  Catheter type: open end  Assessment  Injection assessment: negative aspiration for heme, local visualized surrounding nerve on ultrasound, no paresthesia on injection, incremental injection and transient paresthesias  Paresthesia pain: none  Heart rate change: no  Slow fractionated injection: yes  Additional Notes  IPACK block performed.  Pt identified, time out performed, extremity marked.  Patient supine with transducer beneath knee.  Chloroprep.  1% lidocaine 3cc subcutaneous.  21g needle via lateral, IP approach.  20cc of 0.5% ropivacaine injected in 5cc aliquots after negative aspiration into space between popliteal artery and capsule of the knee.  Images taken, no complications noted

## 2025-02-11 NOTE — CONSULTS
Consults    Reason For Consult  Atrial Fib, CKD 3     History Of Present Illness  Tru Manzo is a 74 y.o. male presents to the orthopedics team with increased pain and decreased ability to perform ADLS due to right knee OA. After failed conservative treatment, patient underwent surgery with no immediate post op complications, reports minimal pain to site described as dull in nature, mild in severity, responding well to pain meds. No other complaints. Hospitalist services were consulted for management of the patient's medical conditions post/op.  Patient examined and seen, resting comfortably. Denies chest pain, shortness of breath, abdominal pain, dizziness, fever or chills. Currently patient vital signs are stable. Plan of care was discussed with patient, verbalized understanding through teach back method. Hospitalist team will continue to follow until discharge.    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    Surgical History  He has a past surgical history that includes Cardiac pacemaker placement (Left, 01/27/2016); Hip surgery (01/27/2016); Cardiac catheterization; Eye surgery; Cataract extraction; Intraocular lens insertion; Colonoscopy; Tonsillectomy; Ankle fracture surgery; Hip Arthroplasty (Right); Total shoulder arthroplasty (Left); ORIF pelvic fracture; and Leg Surgery (Right).     Social  History  He reports that he has quit smoking. His smoking use included cigarettes. He has never used smokeless tobacco. He reports current alcohol use of about 2.0 standard drinks of alcohol per week. He reports that he does not use drugs.    Family History  No family history on file.     Allergies  Patient has no known allergies.       Physical Exam  Constitutional: Well developed, awake/alert/oriented x3, cooperative, sleepy easily arousable    Eyes: PERRL,  clear sclera  ENMT: mucous membranes moist, no apparent injury, no lesions seen  Head/Neck: Neck supple, no apparent injury,  Respiratory/Thorax: Patent airways,  normal breath sounds with good chest expansion, thorax symmetric  Cardiovascular: Regular, rate and rhythm, no murmurs, 2+ equal pulses of the extremities, normal S 1and S 2  Gastrointestinal: Nondistended, soft, non-tender,   Musculoskeletal: mild decrease range of motion to right knee s/p sx intervention, good capillary refills bilaterally, +2 pulses, good sensation   Extremities: normal extremities,  incision covered with immobilizer   Skin: warm, dry, intact  Neurological: alert/oriented x 3, speech clear  Psychiatric: appropriate mood and behavior           Last Recorded Vitals  /62   Pulse 62   Temp 35.1 °C (95.2 °F)   Resp 15   Wt 99.1 kg (218 lb 7.6 oz)   SpO2 93%     Relevant Results  Scheduled medications  acetaminophen, 650 mg, oral, q6h JODI  allopurinol, 300 mg, oral, Daily  ceFAZolin, 2 g, intravenous, q8h  ketorolac, 15 mg, intravenous, q6h  [Held by provider] lisinopril, 5 mg, oral, Daily  polyethylene glycol, 17 g, oral, Daily  pravastatin, 20 mg, oral, Nightly  [START ON 2/12/2025] rivaroxaban, 10 mg, oral, Daily  tranexamic acid, 1,950 mg, oral, Once  [START ON 2/12/2025] tranexamic acid, 1,950 mg, oral, Once      Continuous medications  lactated Ringer's, 20 mL/hr, Last Rate: 20 mL/hr (02/11/25 1032)  lactated Ringer's, 50 mL/hr, Last Rate: 50 mL/hr (02/11/25  0759)  lactated Ringer's, 50 mL/hr, Last Rate: 50 mL/hr (02/11/25 1421)  oxygen, 2 L/min      PRN medications  PRN medications: benzocaine-menthol, bisacodyl, cyclobenzaprine, HYDROmorphone, morphine, naloxone, ondansetron ODT **OR** ondansetron, oxyCODONE, oxyCODONE, oxyCODONE, prochlorperazine **OR** prochlorperazine **OR** prochlorperazine, promethazine    Results for orders placed or performed during the hospital encounter of 02/11/25 (from the past 24 hours)   Protime-INR   Result Value Ref Range    Protime 12.3 9.8 - 12.8 seconds    INR 1.1 0.9 - 1.1          Assessment/Plan     # Right knee Replacement Arthoplasty   # Right Knee pain  Admit to Orthopedics Team   Hospitalist team Consulted   DVTp and Pain management per Ortho   PT/OT evaluation  and treatment   CBC/BMP as appropriate, review results  Pulmonary Toileting   Follow up as needed outpatient with Orthopedics     # Paroxsymal Atrial Fibrillation on Xeralto  Xeralto- deferred to Primary team   Telemetry x 24 hours  Hemodynamically stable     # HLD   Continue home medications    #Hx of gout  Continue home medications     # CKD stage 3   Baseline 1.28-1.41  Avoid nephrotoxic agents  Trend     # Hx of Seizures  Continue Keppra   Per family only had 1 seizure, not confirmed      Hemodynamically stable  Pending home medication clarifications    Thank you for consult  Medicine to follow peripherally   Call for acute needs    Time spent  56  minutes obtaining labs, imaging, recommendations, interview, assessment, examination, medication review/ordering, and EMR review.    Plan of care was discussed extensively with patient, family, spouse, son.  Patient verbalized understanding through teach back method. All questions and concerns addressed upon examination.     Of note, this documentation is completed using the Dragon Dictation system (voice recognition software). There may be spelling and/or grammatical errors that were not corrected prior to final  submission.    Leeanna Robertson, APRN-CNP

## 2025-02-11 NOTE — ANESTHESIA PROCEDURE NOTES
Airway  Date/Time: 2/11/2025 11:01 AM  Urgency: elective    Airway not difficult    Staffing  Performed: CRNA   Authorized by: Miguel Mccabe MD    Performed by: EMILIANO Ho-MARGI  Patient location during procedure: OR    Indications and Patient Condition  Indications for airway management: anesthesia and airway protection  Spontaneous Ventilation: absent  Sedation level: deep  Preoxygenated: yes  Patient position: sniffing  MILS maintained throughout  Mask difficulty assessment: 1 - vent by mask  Planned trial extubation    Final Airway Details  Final airway type: endotracheal airway      Successful airway: ETT  Cuffed: yes   Successful intubation technique: direct laryngoscopy  Facilitating devices/methods: intubating stylet  Endotracheal tube insertion site: oral  Blade: Luis  Blade size: #4  ETT size (mm): 7.0  Cormack-Lehane Classification: grade I - full view of glottis  Placement verified by: chest auscultation and capnometry   Cuff volume (mL): 7  Measured from: gums  ETT to gums (cm): 22  Number of attempts at approach: 1    Additional Comments  atraumatic

## 2025-02-12 ENCOUNTER — HOME HEALTH ADMISSION (OUTPATIENT)
Dept: HOME HEALTH SERVICES | Facility: HOME HEALTH | Age: 75
End: 2025-02-12
Payer: MEDICARE

## 2025-02-12 ENCOUNTER — DOCUMENTATION (OUTPATIENT)
Dept: HOME HEALTH SERVICES | Facility: HOME HEALTH | Age: 75
End: 2025-02-12
Payer: MEDICARE

## 2025-02-12 ENCOUNTER — PHARMACY VISIT (OUTPATIENT)
Dept: PHARMACY | Facility: CLINIC | Age: 75
End: 2025-02-12
Payer: COMMERCIAL

## 2025-02-12 VITALS
OXYGEN SATURATION: 96 % | DIASTOLIC BLOOD PRESSURE: 81 MMHG | WEIGHT: 218.48 LBS | RESPIRATION RATE: 16 BRPM | SYSTOLIC BLOOD PRESSURE: 124 MMHG | HEART RATE: 80 BPM | HEIGHT: 72 IN | BODY MASS INDEX: 29.59 KG/M2 | TEMPERATURE: 97.3 F

## 2025-02-12 LAB
ANION GAP SERPL CALC-SCNC: 14 MMOL/L (ref 10–20)
BUN SERPL-MCNC: 37 MG/DL (ref 6–23)
CALCIUM SERPL-MCNC: 9.2 MG/DL (ref 8.6–10.3)
CHLORIDE SERPL-SCNC: 105 MMOL/L (ref 98–107)
CO2 SERPL-SCNC: 24 MMOL/L (ref 21–32)
CREAT SERPL-MCNC: 1.58 MG/DL (ref 0.5–1.3)
EGFRCR SERPLBLD CKD-EPI 2021: 45 ML/MIN/1.73M*2
ERYTHROCYTE [DISTWIDTH] IN BLOOD BY AUTOMATED COUNT: 13.5 % (ref 11.5–14.5)
GLUCOSE SERPL-MCNC: 108 MG/DL (ref 74–99)
HCT VFR BLD AUTO: 33.8 % (ref 41–52)
HGB BLD-MCNC: 11.5 G/DL (ref 13.5–17.5)
MCH RBC QN AUTO: 33.1 PG (ref 26–34)
MCHC RBC AUTO-ENTMCNC: 34 G/DL (ref 32–36)
MCV RBC AUTO: 97 FL (ref 80–100)
NRBC BLD-RTO: 0 /100 WBCS (ref 0–0)
PLATELET # BLD AUTO: 235 X10*3/UL (ref 150–450)
POTASSIUM SERPL-SCNC: 4.5 MMOL/L (ref 3.5–5.3)
RBC # BLD AUTO: 3.47 X10*6/UL (ref 4.5–5.9)
SODIUM SERPL-SCNC: 138 MMOL/L (ref 136–145)
WBC # BLD AUTO: 12.2 X10*3/UL (ref 4.4–11.3)

## 2025-02-12 PROCEDURE — 80048 BASIC METABOLIC PNL TOTAL CA: CPT | Performed by: REGISTERED NURSE

## 2025-02-12 PROCEDURE — 2500000001 HC RX 250 WO HCPCS SELF ADMINISTERED DRUGS (ALT 637 FOR MEDICARE OP)

## 2025-02-12 PROCEDURE — 36415 COLL VENOUS BLD VENIPUNCTURE: CPT | Performed by: REGISTERED NURSE

## 2025-02-12 PROCEDURE — 7100000011 HC EXTENDED STAY RECOVERY HOURLY - NURSING UNIT

## 2025-02-12 PROCEDURE — 97116 GAIT TRAINING THERAPY: CPT | Mod: GP,CQ

## 2025-02-12 PROCEDURE — 85027 COMPLETE CBC AUTOMATED: CPT | Performed by: REGISTERED NURSE

## 2025-02-12 PROCEDURE — 2500000002 HC RX 250 W HCPCS SELF ADMINISTERED DRUGS (ALT 637 FOR MEDICARE OP, ALT 636 FOR OP/ED): Mod: MUE

## 2025-02-12 PROCEDURE — 97110 THERAPEUTIC EXERCISES: CPT | Mod: GP,CQ

## 2025-02-12 PROCEDURE — RXMED WILLOW AMBULATORY MEDICATION CHARGE

## 2025-02-12 PROCEDURE — 97530 THERAPEUTIC ACTIVITIES: CPT | Mod: GP,CQ

## 2025-02-12 PROCEDURE — 97161 PT EVAL LOW COMPLEX 20 MIN: CPT | Mod: GP

## 2025-02-12 PROCEDURE — 2500000001 HC RX 250 WO HCPCS SELF ADMINISTERED DRUGS (ALT 637 FOR MEDICARE OP): Performed by: ORTHOPAEDIC SURGERY

## 2025-02-12 PROCEDURE — 2500000004 HC RX 250 GENERAL PHARMACY W/ HCPCS (ALT 636 FOR OP/ED): Performed by: ORTHOPAEDIC SURGERY

## 2025-02-12 PROCEDURE — 2500000002 HC RX 250 W HCPCS SELF ADMINISTERED DRUGS (ALT 637 FOR MEDICARE OP, ALT 636 FOR OP/ED): Mod: MUE | Performed by: ORTHOPAEDIC SURGERY

## 2025-02-12 PROCEDURE — 97165 OT EVAL LOW COMPLEX 30 MIN: CPT | Mod: GO

## 2025-02-12 PROCEDURE — 97535 SELF CARE MNGMENT TRAINING: CPT | Mod: GO

## 2025-02-12 PROCEDURE — 2500000001 HC RX 250 WO HCPCS SELF ADMINISTERED DRUGS (ALT 637 FOR MEDICARE OP): Performed by: REGISTERED NURSE

## 2025-02-12 RX ORDER — CYCLOBENZAPRINE HCL 5 MG
5 TABLET ORAL 3 TIMES DAILY PRN
Qty: 21 TABLET | Refills: 0 | Status: SHIPPED | OUTPATIENT
Start: 2025-02-12 | End: 2025-02-19

## 2025-02-12 RX ORDER — ACETAMINOPHEN 325 MG/1
650 TABLET ORAL EVERY 6 HOURS SCHEDULED
Qty: 56 TABLET | Refills: 0 | Status: SHIPPED | OUTPATIENT
Start: 2025-02-12 | End: 2025-02-19

## 2025-02-12 RX ORDER — OXYCODONE HYDROCHLORIDE 5 MG/1
5 TABLET ORAL EVERY 6 HOURS PRN
Qty: 28 TABLET | Refills: 0 | Status: SHIPPED | OUTPATIENT
Start: 2025-02-12 | End: 2025-02-19

## 2025-02-12 RX ADMIN — KETOROLAC TROMETHAMINE 15 MG: 30 INJECTION, SOLUTION INTRAMUSCULAR at 08:18

## 2025-02-12 RX ADMIN — OXYCODONE 10 MG: 5 TABLET ORAL at 08:18

## 2025-02-12 RX ADMIN — ALLOPURINOL 300 MG: 300 TABLET ORAL at 08:15

## 2025-02-12 RX ADMIN — LEVETIRACETAM 250 MG: 500 TABLET, FILM COATED ORAL at 08:16

## 2025-02-12 RX ADMIN — METOPROLOL SUCCINATE 50 MG: 50 TABLET, EXTENDED RELEASE ORAL at 08:10

## 2025-02-12 RX ADMIN — KETOROLAC TROMETHAMINE 15 MG: 30 INJECTION, SOLUTION INTRAMUSCULAR at 03:29

## 2025-02-12 RX ADMIN — ACETAMINOPHEN 650 MG: 325 TABLET ORAL at 06:52

## 2025-02-12 RX ADMIN — POLYETHYLENE GLYCOL 3350 17 G: 17 POWDER, FOR SOLUTION ORAL at 08:18

## 2025-02-12 RX ADMIN — OXYCODONE 10 MG: 5 TABLET ORAL at 12:01

## 2025-02-12 RX ADMIN — ACETAMINOPHEN 650 MG: 325 TABLET ORAL at 12:01

## 2025-02-12 RX ADMIN — TRANEXAMIC ACID 1950 MG: 650 TABLET ORAL at 06:52

## 2025-02-12 RX ADMIN — CEFAZOLIN SODIUM 2 G: 2 INJECTION, SOLUTION INTRAVENOUS at 03:29

## 2025-02-12 RX ADMIN — RIVAROXABAN 10 MG: 10 TABLET, FILM COATED ORAL at 08:15

## 2025-02-12 ASSESSMENT — COGNITIVE AND FUNCTIONAL STATUS - GENERAL
DAILY ACTIVITIY SCORE: 21
WALKING IN HOSPITAL ROOM: A LITTLE
MOBILITY SCORE: 20
MOVING TO AND FROM BED TO CHAIR: A LITTLE
DRESSING REGULAR LOWER BODY CLOTHING: A LITTLE
CLIMB 3 TO 5 STEPS WITH RAILING: A LITTLE
MOBILITY SCORE: 17
STANDING UP FROM CHAIR USING ARMS: A LITTLE
MOVING TO AND FROM BED TO CHAIR: A LITTLE
WALKING IN HOSPITAL ROOM: A LITTLE
MOBILITY SCORE: 19
MOVING FROM LYING ON BACK TO SITTING ON SIDE OF FLAT BED WITH BEDRAILS: A LITTLE
STANDING UP FROM CHAIR USING ARMS: A LITTLE
DAILY ACTIVITIY SCORE: 22
TOILETING: A LITTLE
CLIMB 3 TO 5 STEPS WITH RAILING: A LOT
CLIMB 3 TO 5 STEPS WITH RAILING: A LOT
HELP NEEDED FOR BATHING: A LITTLE
TURNING FROM BACK TO SIDE WHILE IN FLAT BAD: A LITTLE
DRESSING REGULAR LOWER BODY CLOTHING: A LITTLE
MOVING TO AND FROM BED TO CHAIR: A LITTLE
TOILETING: A LITTLE
STANDING UP FROM CHAIR USING ARMS: A LITTLE
WALKING IN HOSPITAL ROOM: A LITTLE

## 2025-02-12 ASSESSMENT — ACTIVITIES OF DAILY LIVING (ADL)
HOME_MANAGEMENT_TIME_ENTRY: 11
BATHING_ASSISTANCE: STAND BY

## 2025-02-12 ASSESSMENT — PAIN SCALES - GENERAL
PAINLEVEL_OUTOF10: 0 - NO PAIN
PAINLEVEL_OUTOF10: 7
PAINLEVEL_OUTOF10: 0 - NO PAIN
PAINLEVEL_OUTOF10: 0 - NO PAIN
PAINLEVEL_OUTOF10: 4

## 2025-02-12 ASSESSMENT — PAIN - FUNCTIONAL ASSESSMENT
PAIN_FUNCTIONAL_ASSESSMENT: 0-10

## 2025-02-12 ASSESSMENT — PAIN DESCRIPTION - ORIENTATION: ORIENTATION: RIGHT

## 2025-02-12 ASSESSMENT — PAIN DESCRIPTION - LOCATION: LOCATION: KNEE

## 2025-02-12 NOTE — PROGRESS NOTES
02/12/25 1202   Discharge Planning   Home or Post Acute Services In home services   Type of Home Care Services Home OT;Home PT   Expected Discharge Disposition Home H  ([facility])     Pt is s/p POD #1 Elective Right total knee arthroplasty 2/11/25 with Dr. Cristóbal Nevarez. Pt is weight bearing as tolerated to operative extremity with use of walker for assistance with ambulation. Aquacel dressing to be removed pod7 and incision left open to air. Pt will discharge on Xarelto 10 mg for the first three postoperative days starting on 2/12/25 then can resume xarelto 20 mg once daily on 2/15/25 and take as taking prior to surgery for DVT prophylaxis. AMPAC scores are PT (17) OT (21) recommending continued therapy at low level/intensity. Pt discharge preference is home with [facility] and agency of choice is [facility]. Demographics verified, best phone is alise Rosado cell # [phone]. Pt wife works but will be home until next Wednesday to assist as needed.[facility]  notified of [facility] referral/HCO in Epic, confirms referral received and processed for next day SOC 2/13/25.

## 2025-02-12 NOTE — PROGRESS NOTES
Occupational Therapy    Evaluation    Patient Name: Tru Manzo  MRN: [id number]  Today's Date: 2/12/2025  Time Calculation  Start Time: 0915  Stop Time: 0937  Time Calculation (min): 22 min  602/602-A    Assessment  IP OT Assessment  OT Assessment: Pt is SBA <> IND with all ADLs and functional transfers. Does not qualify for acute OT at this time.  Prognosis: Excellent  Barriers to Discharge Home: No anticipated barriers  Evaluation/Treatment Tolerance: Patient tolerated treatment well  Medical Staff Made Aware: Yes  End of Session Communication: Bedside nurse  End of Session Patient Position: Up in chair, Alarm off, caregiver present    Plan:  OT Frequency: OT eval only  OT Discharge Recommendations: No further acute OT  OT - OK to Discharge: Yes (ok to d/c to next level of care once medically cleared)    Subjective     Current Problem:  1. Unilateral primary osteoarthritis, right knee  acetaminophen (Tylenol) 325 mg tablet    cyclobenzaprine (Flexeril) 5 mg tablet    oxyCODONE (Roxicodone) 5 mg immediate release tablet    Referral to Home Health          General:  General  Reason for Referral: s/p elective R TKA  Referred By: PT/OT 2/11 CATRACHITO Nevarez  Past Medical History Relevant to Rehab: HTN, TIA, Afib, GERD, HLD, arrthymia, seizure disorder, polycystic kidney disease, PPM, NUVIA, TSA, ORIF pelvic fracture  Family/Caregiver Present: Yes (spouse present and supportive)  Co-Treatment: PT  Co-Treatment Reason: to maximize pt function and safety  Prior to Session Communication: Bedside nurse  Patient Position Received: Bed, 3 rail up, Alarm off, caregiver present  General Comment: Pt is a 73 y/o M s/p R TKA by Dr. CATRACHITO Nevarez on 2/11.    Precautions:  Hearing/Visual Limitations: [place]  LE Weight Bearing Status: Weight Bearing as Tolerated (RLE)  Precautions Comment: Knee immobilizer until +SLR and/or no signs of buckling. Pt demo +SLR, KI discontinued.    Pain:  Pain Assessment  Pain Assessment: 0-10  0-10 (Numeric)  Pain Score: 0 - No pain    Objective     Cognition:  Overall Cognitive Status: Within Functional Limits  Orientation Level: Oriented X4             Home Living:  Home Living Comments: Pt lives with his spouse in 1-level home with 3 RACIEL with HR. Has a walk in shower with seat and grab bars.     Prior Function:  Prior Function Comments: Pt reports IND with all mobility without AD, IND with all ADLs, spouse completes iADLs. Pt is retired. Denies falls in the past 3 months. Drives.    ADL:  Eating Assistance: Independent  Grooming Assistance: Modified independent (Device)  Bathing Assistance: Stand by  UE Dressing Assistance: Independent (Pt donned shirt IND while seated.)  LE Dressing Assistance: Stand by (Pt threaded underwear and pants while seated and pulled over hips in supported stance at FWW with SBA. Cues for technique.)  Toileting Assistance with Device: Stand by    Activity Tolerance:  Endurance: Endurance does not limit participation in activity    Bed Mobility/Transfers:   Bed Mobility  Bed Mobility: Yes (Transition from supine to sit EOB with HOB elevated with MOD I.)  Transfers  Transfer: Yes (Sit to/from stand using FWW with SBA, cues to extend RLE when descending.)    Ambulation/Gait Training:  Functional Mobility  Functional Mobility Performed: Yes (Pt completed functional mobility throughout room and hallways using FWW with SBA. Cues for technique with good carryover.)    Sitting Balance:  Static Sitting Balance  Static Sitting-Comment/Number of Minutes: good  Dynamic Sitting Balance  Dynamic Sitting-Comments: good    Standing Balance:  Static Standing Balance  Static Standing-Comment/Number of Minutes: good  Dynamic Standing Balance  Dynamic Standing-Comments: fair+    Strength:  Strength Comments: BUE strength grossly WFL    Coordination:  Movements are Fluid and Coordinated: Yes     Hand Function:  Hand Function  Gross Grasp: Functional    Extremities: RUE   RUE : Within Functional Limits (AROM) and  LUE   LUE: Within Functional Limits (AROM)    Outcome Measures: [facility] Daily Activity  Putting on and taking off regular lower body clothing: A little  Bathing (including washing, rinsing, drying): A little  Putting on and taking off regular upper body clothing: None  Toileting, which includes using toilet, bedpan or urinal: A little  Taking care of personal grooming such as brushing teeth: None  Eating Meals: None  Daily Activity - Total Score: 21                       EDUCATION:  Education  Individual(s) Educated: Patient  Education Provided: Fall precautons, Risk and benefits of OT discussed with patient or other, POC discussed and agreed upon  Education Comment: Compensatory techniques during ADL tasks and functional transfers  Education Documentation  Body Mechanics, taught by Sangeetha Fontenot OT at 2/12/2025 10:04 AM.  Learner: Patient  Readiness: Acceptance  Method: Explanation, Demonstration  Response: Verbalizes Understanding, Demonstrated Understanding    ADL Training, taught by Sangeetha Fontenot OT at 2/12/2025 10:04 AM.  Learner: Patient  Readiness: Acceptance  Method: Explanation, Demonstration  Response: Verbalizes Understanding, Demonstrated Understanding

## 2025-02-12 NOTE — NURSING NOTE
Follow up appointment noted.  Instructed incision care and monitoring for signs of infection. Discussed medication administration and possible side effects. Will use IS and wear compression hose as directed.

## 2025-02-12 NOTE — DISCHARGE SUMMARY
Discharge summary  This patient Tru Manzo was admitted to the hospital on 2/11/2025  after undergoing Procedure(s) (LRB):  TOTAL KNEE ARTHROPLASTY (Right) without complications that morning.    During the postoperative period,while in hospital, patient was medically managed by the hospitalist. Please see medial notes and H&P for patients additional diagnoses.  Ortho agrees with all medical diagnoses and treatments while patient in hospital.  No significant or unexpected findings or abnormal ortho imaging were noted during the hospital stay    Hospital course      Patient tolerated surgical procedure well and there was no complications. Patient progressed adequately through their recovery during hospital stay including PT and rehabilitation.    Patient was then D/C on  to home  in stable condition.  Patient was instructed on the use of pain medications, the signs and symptoms of infection, and was given our number to call should they have any questions or concerns following discharge.    Based on my clinical judgment, the patient was provided with a 7-day prescription for opioid medication at 30 MED, indicated for treatment of acute pain in the setting of recent surgery. OARRS report was run and has demonstrated an appropriate time course.  The patient has been provided with counseling pertaining to safe use of opioid medication.    No acute events during hospitalization.     Patient may WBAT to operative extremity with use of walker for assistance with ambulation   Aquacel dressing to be removed pod7 and incision left open to air  Xarelto 10 mg for the first three postoperative days starting on 2/12/25 then can resume xarelto 20 mg once daily on 2/15/25 and take as taking prior to surgery for DVT prophylaxis   Follow up with surgeon in 2 weeks

## 2025-02-12 NOTE — PROGRESS NOTES
Physical Therapy    Physical Therapy Evaluation    Patient Name: Tru Manzo  MRN: [id number]  Today's Date: 2/12/2025   Time Calculation  Start Time: 0916  Stop Time: 0940  Time Calculation (min): 24 min  602/602-A    Assessment/Plan   PT Assessment  PT Assessment Results: Decreased strength, Decreased range of motion, Decreased endurance, Impaired balance, Decreased mobility, Decreased coordination, Pain  Rehab Prognosis: Good  Evaluation/Treatment Tolerance: Patient limited by fatigue, Patient limited by pain  Barriers to Participation: Comorbidities  End of Session Communication: Bedside nurse  Assessment Comment: pt with decreased mobility/gait strength balance endurance . pt to benefit from skilled PT to address deficits and improve functional mobility .  End of Session Patient Position: Up in chair, Alarm off, caregiver present  IP OR SWING BED PT PLAN  Inpatient or Swing Bed: Inpatient  PT Plan  Treatment/Interventions: Bed mobility, Transfer training, Gait training, Stair training, Balance training, Strengthening, Endurance training, Range of motion, Therapeutic exercise, Therapeutic activity, Home exercise program, Positioning  PT Plan: Ongoing PT  PT Frequency: BID  PT Discharge Recommendations: Low intensity level of continued care  PT Recommended Transfer Status: Assist x1, Assistive device  PT - OK to Discharge: Yes (once medically ready for discharge to next level of care.)    Subjective   Current Problem:  1. Unilateral primary osteoarthritis, right knee  acetaminophen (Tylenol) 325 mg tablet    cyclobenzaprine (Flexeril) 5 mg tablet    oxyCODONE (Roxicodone) 5 mg immediate release tablet    Referral to Home Health        General Visit Information:  General  Reason for Referral: s/p elective R TKA  Referred By: PT/OT 2/11 CATRACHITO Nevarez  Past Medical History Relevant to Rehab: HTN, TIA, Afib, GERD, HLD, arrthymia, seizure disorder, polycystic kidney disease, PPM, NUVIA, TSA, ORIF pelvic fracture  PT  Missed Visit: Yes  Missed Visit Reason: Other (Comment), Patient refused (PT attempt)  Family/Caregiver Present: Yes (wife)  Co-Treatment: OT  Co-Treatment Reason: to maximize pt function and safety  Prior to Session Communication: Bedside nurse  Patient Position Received: Bed, 3 rail up, Alarm off, caregiver present  General Comment: Pt is a 75 y/o M s/p R TKA by Dr. CATRACHITO Nevarez on 2/11.  Home Living:  Home Living  Home Living Comments: Pt lives with his spouse in 1-level home with 3 RACIEL with HR. Has a walk in shower with seat and grab bars.  Prior Level of Function:  Prior Function Per Pt/Caregiver Report  Prior Function Comments: Pt reports IND with all mobility without AD, IND with all ADLs, spouse completes iADLs. Pt is retired. Denies falls in the past 3 months. Drives.  Precautions:  Precautions  Hearing/Visual Limitations: [place]  LE Weight Bearing Status: Weight Bearing as Tolerated  Precautions Comment: knee immobilizer removed able to do SLR  Vital Signs:  Objective   Pain:  Pain Assessment  Pain Assessment: 0-10  0-10 (Numeric) Pain Score: 0 - No pain  Cognition:  Cognition  Overall Cognitive Status: Within Functional Limits  Orientation Level: Oriented X4  General Assessments:  Activity Tolerance  Endurance: Endurance does not limit participation in activity  Sensation  Sensation Comment: intact BLEs  Coordination  Movements are Fluid and Coordinated: Yes  Static Sitting Balance  Static Sitting-Comment/Number of Minutes: good  Dynamic Sitting Balance  Dynamic Sitting-Comments: good  Static Standing Balance  Static Standing-Comment/Number of Minutes: fair  Dynamic Standing Balance  Dynamic Standing-Comments: fair  Functional Assessments:  Bed Mobility  Bed Mobility: Yes (mod  I to EOB)  Transfers  Transfer: Yes (sit to stand/ stand to sit with FWW SBA cues to push up  with standing)  Ambulation/Gait Training  Ambulation/Gait Training Performed: Yes (gait with  ft with SBA cues for heel toe gait  pattern)  Extremity/Trunk Assessments:  RUE   RUE : Within Functional Limits  LUE   LUE: Within Functional Limits  RLE   RLE : Exceptions to WFL  AROM RLE (degrees)  R Knee Flexion 0-130: 85  R Knee Extension 0-130: -4  Strength RLE  R Hip Flexion: 3+/5  R Hip Extension: 3+/5  R Hip ABduction: 3+/5  R Hip ADduction: 3+/5  R Knee Flexion: 3+/5  R Knee Extension: 3+/5  R Ankle Dorsiflexion: 4/5  R Ankle Plantar Flexion: 4/5  LLE   LLE : Within Functional Limits (MMT 5/5)  Outcome Measures:  [facility] Basic Mobility  Turning from your back to your side while in a flat bed without using bedrails: A little  Moving from lying on your back to sitting on the side of a flat bed without using bedrails: A little  Moving to and from bed to chair (including a wheelchair): A little  Standing up from a chair using your arms (e.g. wheelchair or bedside chair): A little  To walk in hospital room: A little  Climbing 3-5 steps with railing: A lot  Basic Mobility - Total Score: 17  Goals:  Encounter Problems       Encounter Problems (Active)       PT Problem       Pt will demonstrate mod I  with bed mobility to edge of bed.         Start:  02/12/25    Expected End:  02/19/25            Pt will demonstrate mod I  with sit to stand/chair transfers with FWW.         Start:  02/12/25    Expected End:  02/19/25            Pt will ambulate 150 feet with FWW MOD I .         Start:  02/12/25    Expected End:  02/19/25            Pt to demo 0-100 ROM of R Knee         Start:  02/12/25    Expected End:  02/19/25            Pt to demo 3 steps with  rails with SUP        Start:  02/12/25    Expected End:  02/19/25            Patient will demonstrate independence in home program for support of progression       Start:  02/12/25    Expected End:  02/19/25               Pain - Adult            Education Documentation  Home Exercise Program, taught by Husam Gaviria, PT at 2/12/2025 12:36 PM.  Learner: Patient  Readiness: Acceptance  Method:  Explanation  Response: Verbalizes Understanding    Mobility Training, taught by Husam Gaviria PT at 2/12/2025 12:36 PM.  Learner: Patient  Readiness: Acceptance  Method: Explanation  Response: Verbalizes Understanding    Education Comments  No comments found.

## 2025-02-12 NOTE — CARE PLAN
The patient's goals for the shift include      The clinical goals for the shift include control nausea    Over the shift, the patient did not make progress toward the following goals. Barriers to progression include none. Recommendations to address these barriers include none.

## 2025-02-12 NOTE — PROGRESS NOTES
Mara I am very sorry but the discharge med list that they sent I cannot read as it is cut off so I need another copy of that please.    Did receive her labs from 3/15/2020 CMP showed normal creatinine and BUN 0.75 and 9, sodium minimally low 134, potassium normal 4.6, LFTs not elevated calcium little elevated 10.7 (10.4) with slightly elevated globulin of 3.7 (3.5)   Physical Therapy    Physical Therapy    Physical Therapy Treatment    Patient Name: Tru Manzo  MRN: [id number]  Today's Date: 2/12/2025  Time Calculation  Start Time: 1040  Stop Time: 1120  Time Calculation (min): 40 min       Assessment/Plan   PT Assessment  PT Assessment Results: Decreased strength, Decreased range of motion, Decreased endurance, Impaired balance, Decreased mobility, Decreased coordination, Pain  Rehab Prognosis: Good  Evaluation/Treatment Tolerance: Patient limited by fatigue, Patient limited by pain  Barriers to Participation: Comorbidities  End of Session Communication: Bedside nurse  Assessment Comment: pt with decreased mobility/gait strength balance endurance . pt to benefit from skilled PT to address deficits and improve functional mobility .  End of Session Patient Position: Up in chair, Alarm off, caregiver present     PT Plan  Treatment/Interventions: Bed mobility, Transfer training, Gait training, Stair training, Balance training, Strengthening, Endurance training, Range of motion, Therapeutic exercise, Therapeutic activity, Home exercise program, Positioning  PT Plan: Ongoing PT  PT Frequency: BID  PT Discharge Recommendations: Low intensity level of continued care      Current Problem:  1. Unilateral primary osteoarthritis, right knee  acetaminophen (Tylenol) 325 mg tablet    cyclobenzaprine (Flexeril) 5 mg tablet    oxyCODONE (Roxicodone) 5 mg immediate release tablet    Referral to Home Health          General Visit Information:   PT  Visit  PT Received On: 02/12/25  General  Reason for Referral: R TKR  Family/Caregiver Present: Yes  Prior to Session Communication: Bedside nurse  Patient Position Received: Up in chair, Alarm on  Preferred Learning Style: verbal  General Comment:  (Pt sitting up in recliner- ice to R knee.  Family member present.)  Subjective     Precautions:  Precautions  Hearing/Visual Limitations: Lime  LE Weight Bearing Status: Weight Bearing as  Tolerated  Medical Precautions: Fall precautions  Precautions Comment: + SLR KI removed.       Objective     Pain:  Pain Assessment  Pain Assessment: 0-10  0-10 (Numeric) Pain Score: 4  Pain Type: Surgical pain, Acute pain  Pain Location: Knee  Pain Orientation: Right  Pain Interventions: Cold applied      Extremity/Trunk Assessments:        AROM RLE (degrees)  R Knee Flexion 0-130: 90 (Heel slides on EOC   + SLR without Ki)  R Knee Extension 0-130: 5 (Passive stretching in longsitting position)         Treatments:  Therapeutic Exercise  Therapeutic Exercise Performed: Yes  Therapeutic Exercise Activity 1: Pt performed supine ther ex including ankle pumps, quad sets, glut sets B/L LEs and heel slides and SAQ R   LE 10 reps x1, SLR 5 reps x2. Cues for technique and breathing.  Therapeutic Exercise Activity 2: Pt performed seated ther ex including heel/toe raises and hip ABD, LAQ, heel slides, and marches 10 reps x1. Reviewed home exercise program. Educated on goal for 2-3 sets of 20 reps to tolerance. Pt with good understanding.        Bed Mobility  Bed Mobility: Yes  Bed Mobility 1  Bed Mobility 1: Supine to sitting  Level of Assistance 1: Modified independent  Bed Mobility 2  Bed Mobility  2: Sitting to supine  Level of Assistance 2: Modified independent  Bed Mobility Comments 2:  (Pt performs bed mobility mod independence with hooking L ankle/foot underneath RLE.)  Ambulation/Gait Training  Ambulation/Gait Training Performed: Yes  Ambulation/Gait Training 1  Surface 1: Level tile  Device 1: Rolling walker  Assistance 1: Close supervision  Quality of Gait 1: Inconsistent stride length, Decreased step length  Comments/Distance (ft) 1:  (Pt ambulates with WW  WBAT without Ki with uneven strides with initial step to gait pattern.  V/c to heel-to-toe strike increasing stride length to establish a step through gait pattern.  Distance hermelinda 125 ftx2 with supervision.)  Transfers  Transfer: Yes  Transfer 1  Technique 1: Sit  to stand, Stand to sit  Transfer Device 1: Walker  Transfer Level of Assistance 1: Close supervision  Trials/Comments 1:  (Pt transfers with WW  WBAT without KI with supervision with v/c for proper technique/ hand placement.)  Stairs  Stairs: Yes  Stairs  Rails 1: Right  Device 1: Single point cane  Assistance 1: Close supervision  Comment/Number of Steps 1:  (Pt performs stair climbing with 1 HR and SPC with supervision with v/c for proper technique/ sequence.  4 steps x2.)       Outcome Measures:  [facility] Basic Mobility  Turning from your back to your side while in a flat bed without using bedrails: None  Moving from lying on your back to sitting on the side of a flat bed without using bedrails: None  Moving to and from bed to chair (including a wheelchair): A little  Standing up from a chair using your arms (e.g. wheelchair or bedside chair): A little  To walk in hospital room: A little  Climbing 3-5 steps with railing: A little  Basic Mobility - Total Score: 20  Education Documentation  No documentation found.  Education Comments  No comments found.        EDUCATION:    Individual(s) Educated: Patient  Encounter Problems       Encounter Problems (Active)       PT Problem       Pt will demonstrate mod I  with bed mobility to edge of bed.   (Met)       Start:  02/12/25    Expected End:  02/19/25    Resolved:  02/12/25         Pt will demonstrate mod I  with sit to stand/chair transfers with FWW.   (Progressing)       Start:  02/12/25    Expected End:  02/19/25            Pt will ambulate 150 feet with FWW MOD I .   (Progressing)       Start:  02/12/25    Expected End:  02/19/25            Pt to demo 0-100 ROM of R Knee   (Progressing)       Start:  02/12/25    Expected End:  02/19/25            Pt to demo 3 steps with  rails with SUP  (Progressing)       Start:  02/12/25    Expected End:  02/19/25            Patient will demonstrate independence in home program for support of progression (Progressing)       Start:   02/12/25    Expected End:  02/19/25         Goal Note       Patient will demonstrate independence in home program for support of progression                 Pain - Adult

## 2025-02-12 NOTE — SIGNIFICANT EVENT
Medicine -   Hemodynamically stable at this time  Reviewed labs- as to be expected post operative  Medicine to sign off , please call if acute needs or requested assessment is needed.    Per Ortho patient is scheduled to discharge  Labs consistent with pre-operative labs      02/12/25 at 11:34 AM - EMILIANO Lackey-CNP

## 2025-02-12 NOTE — HH CARE COORDINATION
Home Care received a Referral for Physical Therapy and Occupational Therapy. We have processed the referral for a Start of Care on 2/13/25.     If you have any questions or concerns, please feel free to contact us at [phone]. Follow the prompts, enter your five digit zip code, and you will be directed to your care team on [facility] 1.

## 2025-02-12 NOTE — PROGRESS NOTES
Progress Note   TKA    Subjective:     Post-Operative Day: 1 Status Post right Total Knee Arthroplasty  Systemic or Specific Complaints:No Complaints    Objective:     Visit Vitals  /81   Pulse 80   Temp 36.3 °C (97.3 °F)   Resp 16       General: alert and oriented, in no acute distress, appears stated age, and cooperative   Wound: no erythema, no edema, no drainage, and dressing CDI   Motion: Painful range of Motion   DVT Exam: No evidence of DVT seen on physical exam.  Negative Shefali's sign.  No significant calf/ankle edema.       Knee swollen but thigh soft to palpation. Moving foot and ankle. Good distal pulses.      Data Review  No results found for this or any previous visit (from the past 24 hours).      Assessment:     Status Post right Total Knee Arthroplasty. Doing well postoperatively.     Acute postoperative pain: Reports mild to moderate pain to operative extremity.  Exacerbated by movement, relieved by rest ice and pain medication.  Continue current pain management.    No acute events overnight.     Plan:      1: Discharge today, Return to Clinic: 2 weeks  :  2:  Continue Deep venous thrombosis prophylaxis: Xarelto 10 mg for the first three postoperative days. Can resume xaraelto 20 mg daily starting on postoperative day four.   3:  Continue physical therapy: WBAT to operative extremity with use of walker for assistance with ambulation.   4:  Continue Pain Control  5.   Discharge planning: Plans to return to home with [facility], orders placed. SW and TCC following.     Renzo Olmedo, EMILIANO-CNP

## 2025-02-13 ENCOUNTER — HOME CARE VISIT (OUTPATIENT)
Dept: HOME HEALTH SERVICES | Facility: HOME HEALTH | Age: 75
End: 2025-02-13
Payer: MEDICARE

## 2025-02-13 VITALS — HEART RATE: 77 BPM | OXYGEN SATURATION: 94 %

## 2025-02-13 VITALS — TEMPERATURE: 97.5 F

## 2025-02-13 PROCEDURE — G0152 HHCP-SERV OF OT,EA 15 MIN: HCPCS | Mod: HHH

## 2025-02-13 PROCEDURE — 169592 NO-PAY CLAIM PROCEDURE

## 2025-02-13 PROCEDURE — G0151 HHCP-SERV OF PT,EA 15 MIN: HCPCS | Mod: HHH

## 2025-02-13 SDOH — HEALTH STABILITY: PHYSICAL HEALTH: PHYSICAL EXERCISE: SEATED

## 2025-02-13 SDOH — HEALTH STABILITY: PHYSICAL HEALTH: EXERCISE ACTIVITY: HIP ABD/ADDUCTION

## 2025-02-13 SDOH — HEALTH STABILITY: PHYSICAL HEALTH: EXERCISE ACTIVITY: ANKLE PUMPS

## 2025-02-13 SDOH — HEALTH STABILITY: PHYSICAL HEALTH: EXERCISE ACTIVITY: KNEE EXTENSION

## 2025-02-13 SDOH — HEALTH STABILITY: PHYSICAL HEALTH: EXERCISE TYPE: INSTRUCTED AND DEMONSTRATED SBA SEATED THER EX PROGRAM, USING HOSPITAL HANDOUTS

## 2025-02-13 SDOH — HEALTH STABILITY: PHYSICAL HEALTH: EXERCISE ACTIVITY: KNEE FLEXION

## 2025-02-13 SDOH — HEALTH STABILITY: PHYSICAL HEALTH: EXERCISE ACTIVITY: HIP FLEXION

## 2025-02-13 SDOH — HEALTH STABILITY: PHYSICAL HEALTH: EXERCISE ACTIVITY: SIT TO STAND

## 2025-02-13 ASSESSMENT — ACTIVITIES OF DAILY LIVING (ADL)
DRESSING_UB_CURRENT_FUNCTION: INDEPENDENT
AMBULATION ASSISTANCE: 1
PHYSICAL TRANSFERS ASSESSED: 1
DRESSING_LB_CURRENT_FUNCTION: INDEPENDENT
ENTERING_EXITING_HOME: MINIMUM ASSIST
PHYSICAL_TRANSFERS_DEVICES: WW
AMBULATION ASSISTANCE ON FLAT SURFACES: 1
CURRENT_FUNCTION: INDEPENDENT
OASIS_M1830: 03
TOILETING: 1
TOILETING: INDEPENDENT
AMBULATION ASSISTANCE: INDEPENDENT

## 2025-02-13 ASSESSMENT — ENCOUNTER SYMPTOMS
PAIN LOCATION - PAIN QUALITY: ACHING, SHOOTING
SUBJECTIVE PAIN PROGRESSION: UNCHANGED
PAIN LOCATION - PAIN FREQUENCY: CONSTANT
PAIN LOCATION - PAIN SEVERITY: 10/10
PAIN SEVERITY GOAL: 3/10
PERSON REPORTING PAIN: PATIENT
OCCASIONAL FEELINGS OF UNSTEADINESS: 0
PERSON REPORTING PAIN: PATIENT
PAIN LOCATION - PAIN SEVERITY: 9/10
PAIN: 1
PAIN LOCATION - PAIN FREQUENCY: CONSTANT
HIGHEST PAIN SEVERITY IN PAST 24 HOURS: 9/10
LOSS OF SENSATION IN FEET: 0
PAIN LOCATION: RIGHT KNEE
HIGHEST PAIN SEVERITY IN PAST 24 HOURS: 10/10
HYPERTENSION: 1
PAIN LOCATION - PAIN QUALITY: THROBBING
PAIN LOCATION: LEFT KNEE
PAIN SEVERITY GOAL: 0/10
DEPRESSION: 0
SUBJECTIVE PAIN PROGRESSION: UNCHANGED
OCCASIONAL FEELINGS OF UNSTEADINESS: 1
PAIN: 1
LOWEST PAIN SEVERITY IN PAST 24 HOURS: 7/10
LOWEST PAIN SEVERITY IN PAST 24 HOURS: 6/10

## 2025-02-18 ENCOUNTER — HOME CARE VISIT (OUTPATIENT)
Dept: HOME HEALTH SERVICES | Facility: HOME HEALTH | Age: 75
End: 2025-02-18
Payer: MEDICARE

## 2025-02-18 VITALS — TEMPERATURE: 97.9 F

## 2025-02-18 PROCEDURE — G0151 HHCP-SERV OF PT,EA 15 MIN: HCPCS | Mod: HHH

## 2025-02-18 SDOH — HEALTH STABILITY: PHYSICAL HEALTH: PHYSICAL EXERCISE: SEATED

## 2025-02-18 SDOH — HEALTH STABILITY: PHYSICAL HEALTH: EXERCISE ACTIVITY: MINI SQUATS

## 2025-02-18 SDOH — HEALTH STABILITY: PHYSICAL HEALTH: EXERCISE ACTIVITY: HIP ABD/ADDUCTION

## 2025-02-18 SDOH — HEALTH STABILITY: PHYSICAL HEALTH: EXERCISE ACTIVITY: HIP FLEXION

## 2025-02-18 SDOH — HEALTH STABILITY: PHYSICAL HEALTH: EXERCISE ACTIVITY: KNEE FLEXION

## 2025-02-18 SDOH — HEALTH STABILITY: PHYSICAL HEALTH: EXERCISE ACTIVITY: ANKLE PUMPS

## 2025-02-18 SDOH — HEALTH STABILITY: PHYSICAL HEALTH: EXERCISE TYPE: INSTRUCTED AND DEMONSTRATED SBA SEATED THER EX PROGRAM

## 2025-02-18 SDOH — HEALTH STABILITY: PHYSICAL HEALTH: EXERCISE ACTIVITY: KNEE EXTENSION

## 2025-02-18 ASSESSMENT — ACTIVITIES OF DAILY LIVING (ADL): AMBULATION ASSISTANCE ON FLAT SURFACES: 1

## 2025-02-18 ASSESSMENT — ENCOUNTER SYMPTOMS
PERSON REPORTING PAIN: PATIENT
PAIN LOCATION - PAIN SEVERITY: 2/10
HIGHEST PAIN SEVERITY IN PAST 24 HOURS: 4/10
PAIN LOCATION - PAIN QUALITY: ACHING
PAIN SEVERITY GOAL: 0/10
SUBJECTIVE PAIN PROGRESSION: UNCHANGED
PAIN: 1
PAIN LOCATION - PAIN FREQUENCY: CONSTANT
LOWEST PAIN SEVERITY IN PAST 24 HOURS: 2/10
PAIN LOCATION: RIGHT KNEE

## 2025-02-19 ENCOUNTER — HOME CARE VISIT (OUTPATIENT)
Dept: HOME HEALTH SERVICES | Facility: HOME HEALTH | Age: 75
End: 2025-02-19
Payer: MEDICARE

## 2025-02-19 VITALS — TEMPERATURE: 97.9 F

## 2025-02-19 PROCEDURE — G0151 HHCP-SERV OF PT,EA 15 MIN: HCPCS | Mod: HHH

## 2025-02-19 SDOH — HEALTH STABILITY: PHYSICAL HEALTH: EXERCISE ACTIVITY: KNEE FLEXION

## 2025-02-19 SDOH — HEALTH STABILITY: PHYSICAL HEALTH: EXERCISE TYPE: INSTRUCTED AND DEMONSTRATED SBA SEATED THER EX PROGRAM

## 2025-02-19 SDOH — HEALTH STABILITY: PHYSICAL HEALTH: EXERCISE ACTIVITY: ANKLE PUMPS

## 2025-02-19 SDOH — HEALTH STABILITY: PHYSICAL HEALTH: EXERCISE ACTIVITY: HIP ABD/ADDUCTION

## 2025-02-19 SDOH — HEALTH STABILITY: PHYSICAL HEALTH: EXERCISE ACTIVITY: KNEE EXTENSION

## 2025-02-19 SDOH — HEALTH STABILITY: PHYSICAL HEALTH: PHYSICAL EXERCISE: SEATED

## 2025-02-19 SDOH — HEALTH STABILITY: PHYSICAL HEALTH: EXERCISE ACTIVITY: HIP FLEXION

## 2025-02-19 SDOH — HEALTH STABILITY: PHYSICAL HEALTH: EXERCISE ACTIVITY: SIT TO STAND

## 2025-02-19 ASSESSMENT — ENCOUNTER SYMPTOMS
SUBJECTIVE PAIN PROGRESSION: UNCHANGED
PAIN SEVERITY GOAL: 0/10
LOWEST PAIN SEVERITY IN PAST 24 HOURS: 2/10
PAIN LOCATION - PAIN FREQUENCY: CONSTANT
PAIN: 1
PERSON REPORTING PAIN: PATIENT
HIGHEST PAIN SEVERITY IN PAST 24 HOURS: 5/10
PAIN LOCATION - PAIN QUALITY: ACHING
PAIN LOCATION - PAIN SEVERITY: 4/10
PAIN LOCATION: RIGHT KNEE

## 2025-02-19 ASSESSMENT — ACTIVITIES OF DAILY LIVING (ADL)
AMBULATION_DISTANCE/DURATION_TOLERATED: INSTRUCTED AND DEMONSTRATED SUP INDEP GAIT TRAINING WITH FRONT WHEELED WALKER APPROX 150FT
AMBULATION ASSISTANCE ON FLAT SURFACES: 1

## 2025-02-20 NOTE — PROGRESS NOTES
Physical Therapy  Physical Therapy Evaluation    Patient Name: Tru Manzo  MRN: [id number]  Today's Date: 2/24/2025  Time Calculation  Start Time: 1035  Stop Time: 1108  Time Calculation (min): 33 min  PT Evaluation Time Entry  PT Evaluation (Low) Time Entry: 25  PT Therapeutic Procedures Time Entry  Therapeutic Exercise Time Entry: 8                   Insurance:  COPAY 0 (0)   Visit number: 1 of 9  Authorization info: No Auth Req  Insurance Type: MEDICARE/ AARP 2410($0 USED)     General:  Reason for visit: s/p R TKA 2/11/25  Referred by: CATRACHITO Nevarez MD    Current Problem:  M17.11ICD-10-CMUnilateral primary osteoarthritis, right knee     Precautions: TKA         Medical History Form: Reviewed (scanned into chart)    Subjective:   Tru Manzo is here today for his first post-op visit.  He is two weeks out from a total knee arthroplasty.  He is having normal post-operative pain. He is overall improving. He is using a brace. Wife notes that he managed to go downstairs walking. He is taking Motrin and Advil every 6 hours as needed. -Encounter note 2/24/25  Pt states he is using Fww not brace.    Current Condition:   Better    Pain:  Pain Assessment: 0-10  0-10 (Numeric) Pain Score: 4  Pain Location: Knee  Pain Orientation: Right  Pain Descriptors: Sore  Aggravating Factors:  Bending/Straightening Knee, Walking, and Stairs  Relieving Factors:  Rest    Relevant Information (PMH & Previous Tests/Imaging): XR 2/24/25 Results not posted  Previous Interventions/Treatments: Physical Therapy-HHC    Prior Level of Function (PLOF)  Patient previously independent with all ADLs  Exercise/Physical Activity: NA  Work/School: NA    Patients Living Environment: Reviewed and no concern    Primary Language: English     Patient's Goal(s) for Therapy: NA    Red Flags: Do you have any of the following? No  Fever/chills, unexplained weight changes, dizziness/fainting, unexplained change in bowel or bladder functions,  unexplained malaise or muscle weakness, night pain/sweats, numbness or tingling    Objective:    Knee ROM (degrees) -  R knee Extension: -10 AROM  R knee Flexion: 84 AROM       Knee MMT (/5) -   R knee Extension: NT   R knee Flexion: NT       Integumentary -   Had to visualize knee incision d/t thigh high ally hose, not apparent ss of infection noted.    Palpation -  TTP generalized in R knee  Tightness Noted of R Quad/Ham    Special Tests -NA      Gait -  Pt is ambulating with an assistive device (FWW). Pattern: Noted: mild  antalgic, decreased heel strike, decreased toe push off,slight lurch w/ RLE    Posture -   Lower Extremity Functional Movements  Transfers: Ind  Gait: Ind   Stairs S w/ 2 HR's 2 feet /step    Outcome Measures:  Other Measures  Lower Extremity Funtional Score (LEFS): 21       EDUCATION: Pt educated in HEP, PT POC, anatomy, activity avoidance, proper positioning/posture, use of cold , pt demonstrates understanding of PT info, all questions answered.        Goals: Set and discussed today  Increase ROM to WFL of R knee  Increase Strength where deficits noted by 1/3 to 1 mm grade of R knee  Ind w/ HEP to expedite progress and promote goal achievement of R knee.    Improve Outcome score  for evidence of improved function of R knee.  Return to PLOF of R knee  Decrease pain to 2/10 or less of R knee      Plan of care was developed with input and agreement by the patient.    Treatment Performed:    Therapeutic Exercise:    8 min  SLR w/. Min A 10x  SAQ w/ Min A 10x  Bridges 2x10  HS w/ QS 10x      Assessment: 75 y/o M presents with c/o r KNEE Pain. Upon examination patient demonstrates moderate pain limiting overall functional mobility including R knee function. Activity limitations and participations restrictions include house chores. Pt to benefit from outpatient PT to address deficits, maximize functional mobility and improve QOL.  The clinical presentation of this patient is stable and their history  and examination findings are consistent with a low complexity evaluation with good rehab potential.            Plan:     Planned Interventions include: therapeutic exercise, self-care home management, manual therapy, therapeutic activities, gait training, neuromuscular coordination, vasopneumatic, dry needling, aquatic therapy  Frequency: 2x/wk  Duration: 4weeks      Vu Hunt PT

## 2025-02-22 ENCOUNTER — HOME CARE VISIT (OUTPATIENT)
Dept: HOME HEALTH SERVICES | Facility: HOME HEALTH | Age: 75
End: 2025-02-22
Payer: MEDICARE

## 2025-02-22 PROCEDURE — G0151 HHCP-SERV OF PT,EA 15 MIN: HCPCS | Mod: HHH

## 2025-02-22 SDOH — HEALTH STABILITY: PHYSICAL HEALTH: PHYSICAL EXERCISE: STANDING

## 2025-02-22 SDOH — HEALTH STABILITY: PHYSICAL HEALTH: EXERCISE ACTIVITY: HEEL RAISES

## 2025-02-22 SDOH — HEALTH STABILITY: PHYSICAL HEALTH: EXERCISE ACTIVITY: MINI SQUATS

## 2025-02-22 SDOH — HEALTH STABILITY: PHYSICAL HEALTH: EXERCISE ACTIVITY: HIP FLEXION

## 2025-02-22 SDOH — HEALTH STABILITY: PHYSICAL HEALTH: EXERCISE ACTIVITY: HIP ABDUCTION

## 2025-02-22 SDOH — HEALTH STABILITY: PHYSICAL HEALTH: EXERCISE TYPE: INSTRUCTED AND DEMONSTRATED SBA STANDING THER EX PROGRAM, NEW HANDOUT PROVIDED

## 2025-02-22 SDOH — HEALTH STABILITY: PHYSICAL HEALTH: EXERCISE ACTIVITY: KNEE FLEXION

## 2025-02-22 SDOH — HEALTH STABILITY: PHYSICAL HEALTH: EXERCISE ACTIVITY: HIP EXTENSION

## 2025-02-22 ASSESSMENT — ACTIVITIES OF DAILY LIVING (ADL)
AMBULATION ASSISTANCE ON FLAT SURFACES: 1
HOME_HEALTH_OASIS: 00
OASIS_M1830: 01

## 2025-02-22 ASSESSMENT — ENCOUNTER SYMPTOMS
PAIN LOCATION - PAIN FREQUENCY: FREQUENT
SUBJECTIVE PAIN PROGRESSION: UNCHANGED
PERSON REPORTING PAIN: PATIENT
PAIN SEVERITY GOAL: 0/10
PAIN LOCATION: RIGHT KNEE
PAIN LOCATION - PAIN SEVERITY: 2/10
OCCASIONAL FEELINGS OF UNSTEADINESS: 0
LOWEST PAIN SEVERITY IN PAST 24 HOURS: 0/10
HIGHEST PAIN SEVERITY IN PAST 24 HOURS: 2/10
PAIN: 1
PAIN LOCATION - PAIN QUALITY: ACHING

## 2025-02-23 NOTE — PROGRESS NOTES
History: Tru Manzo is here today for his first post-op visit.  He is two weeks out from a total knee arthroplasty.  He is having normal post-operative pain. He is overall improving. He is using a brace. Wife notes that he managed to go downstairs walking. He is taking Motrin and Advil every 6 hours as needed.      Physical Exam: The wound is healing nicely. There is no redness, irritation, or drainage.  The patient is able to straight leg raise. Range of motion is from 8 to 90 degrees. Swelling and ecchymosis are normal for this stage of healing.  He is neurovascularly intact throughout the extremity.     Radiographs: AP and lateral views of the right knee show excellent alignment of the total knee arthroplasty with no acute abnormality.     Assessment: Stable right total knee arthroplasty, 2 weeks out.     Plan: The patient is overall making progress. He continues to have post-operative pain and aches in the knee. He may use compression stocking to manage the swelling. He should continue using brace until his stability improves. He may continue to take acetaminophen for relief as he does not want further pain medicine at this time. Given that he is on Xarelto, we advised him not to take Advil.  He is going to continue with physical therapy.  His order was updated today. The goal for next visit is range of motion between 110-120 degrees.  He will continue to ice several times throughout the day.  He will continue to wean down off narcotics.   He will follow up in 4 weeks for a follow up with 2 view knee x-rays.   All questions were answered with the patient.     Scribe Attestation  By signing my name below, Aretha LANTIGUA Scribe   attest that this documentation has been prepared under the direction and in the presence of Cristóbal Nevarez MD.     Scribe Attestation  By signing my name below, ITyar Scribe   attest that this documentation has been prepared under the direction and in the  presence of Cristóbal Nevarez MD.

## 2025-02-24 ENCOUNTER — HOSPITAL ENCOUNTER (OUTPATIENT)
Dept: RADIOLOGY | Facility: CLINIC | Age: 75
Discharge: HOME | End: 2025-02-24
Payer: MEDICARE

## 2025-02-24 ENCOUNTER — OFFICE VISIT (OUTPATIENT)
Dept: ORTHOPEDIC SURGERY | Facility: CLINIC | Age: 75
End: 2025-02-24
Payer: MEDICARE

## 2025-02-24 ENCOUNTER — EVALUATION (OUTPATIENT)
Dept: PHYSICAL THERAPY | Facility: CLINIC | Age: 75
End: 2025-02-24
Payer: MEDICARE

## 2025-02-24 DIAGNOSIS — M17.11 PRIMARY OSTEOARTHRITIS OF RIGHT KNEE: ICD-10-CM

## 2025-02-24 DIAGNOSIS — M17.11 UNILATERAL PRIMARY OSTEOARTHRITIS, RIGHT KNEE: Primary | ICD-10-CM

## 2025-02-24 PROCEDURE — 97110 THERAPEUTIC EXERCISES: CPT | Mod: GP | Performed by: PHYSICAL THERAPIST

## 2025-02-24 PROCEDURE — 97161 PT EVAL LOW COMPLEX 20 MIN: CPT | Mod: GP | Performed by: PHYSICAL THERAPIST

## 2025-02-24 PROCEDURE — 73560 X-RAY EXAM OF KNEE 1 OR 2: CPT | Mod: RT

## 2025-02-24 PROCEDURE — 99211 OFF/OP EST MAY X REQ PHY/QHP: CPT | Performed by: ORTHOPAEDIC SURGERY

## 2025-02-24 PROCEDURE — 73560 X-RAY EXAM OF KNEE 1 OR 2: CPT | Mod: RIGHT SIDE | Performed by: ORTHOPAEDIC SURGERY

## 2025-02-24 ASSESSMENT — PAIN SCALES - GENERAL: PAINLEVEL_OUTOF10: 4

## 2025-02-24 ASSESSMENT — PAIN DESCRIPTION - DESCRIPTORS: DESCRIPTORS: SORE

## 2025-02-24 ASSESSMENT — PAIN - FUNCTIONAL ASSESSMENT: PAIN_FUNCTIONAL_ASSESSMENT: 0-10

## 2025-03-04 ENCOUNTER — TREATMENT (OUTPATIENT)
Dept: PHYSICAL THERAPY | Facility: CLINIC | Age: 75
End: 2025-03-04
Payer: MEDICARE

## 2025-03-04 DIAGNOSIS — M17.11 UNILATERAL PRIMARY OSTEOARTHRITIS, RIGHT KNEE: ICD-10-CM

## 2025-03-04 PROCEDURE — 97110 THERAPEUTIC EXERCISES: CPT | Mod: GP,CQ

## 2025-03-04 ASSESSMENT — PAIN SCALES - GENERAL: PAINLEVEL_OUTOF10: 3

## 2025-03-04 ASSESSMENT — PAIN DESCRIPTION - DESCRIPTORS: DESCRIPTORS: DULL

## 2025-03-04 ASSESSMENT — PAIN - FUNCTIONAL ASSESSMENT: PAIN_FUNCTIONAL_ASSESSMENT: 0-10

## 2025-03-04 NOTE — PROGRESS NOTES
"Physical Therapy Treatment    Patient Name: Tru Manzo  MRN: [id number]  Today's Date: 3/4/2025  Time Calculation  Start Time: 0901  Stop Time: 0945  Time Calculation (min): 44 min    Insurance  COPAY 0 (0)   Visit number: 2 of 8  Authorization info: No Auth Req  Insurance Type: MEDICARE/ AARP 2410($0 USED)      Current Problem  1. Unilateral primary osteoarthritis, right knee  Follow Up In Physical Therapy          Subjective    General  Pt reports knee is feeling \"much better than it did last week.\"  States he did well with initial visit.  He has been working on the HEP given that visit.  He reports he is now able to lift his R leg onto/off of the couch without having to assist with UE.     Precautions  Precautions  Precautions Comment: no recent falls reported    Pain  Pain Assessment  Pain Assessment: 0-10  0-10 (Numeric) Pain Score: 3  Pain Location: Knee  Pain Orientation: Right  Pain Radiating Towards: up in lateral thigh  Pain Descriptors: Dull  Pain Frequency: Intermittent  Clinical Progression: Gradually improving  Effect of Pain on Daily Activities: Walking; Standing>5 min; Negotiating Stairs; Sleep    Objective   Pt ambulates with wheeled walker for support.  States he has been walking with SPC at times around his house.    Treatments:  Therapeutic Exercises (46820): 42 Minutes, 3 Units    Activities:  Heel Slide: 2x10  QS: 5\" x20  HS Stretch: 20\" x5  Calf Stretch: 20\" x5  Hooklying Hip Adduction Squeeze: 5\" 2x10  Hooklying Hip Abduction: no resist, 2x10  SLR: 3x5  SAQ: 2x5  Bridges 2x10     Assessment:   Reviewed activities initiated first visit & continued to progress with rehab POC as noted.  Pt exhibits good recall of the ex's & had good follow through to vc's provided for new activities, technique with all ex's.  Improvement in strength noted from initial visit as pt was able to lift R LE onto plynth without assistance & also able to complete SAQ & SLR today without assist.  Moderate " lag noted with SLR still noted secondary to overall weakness.  Normal muscle soreness, fatigue reported with activities performed today.  Overall, good ability to complete session.  Anticipate he will be able to continue with POC as tolerated, per protocol.  Pt provided with updated HEP handout with activities added this visit.    Plan:   Continue with POC & progress with activities as tolerated, per protocol, to increase ROM, strength, stability of R knee & improve functional mobility, capacity for daily activities.    OP EDUCATION:   Access Code: [email or web address]  URL: [email or web address]  Date: 03/04/2025  Prepared by: Jina Wood    Exercises  - Hooklying Hamstring Stretch with Strap  - 2 x daily - 7 x weekly - 5 reps - 30 hold  - Long Sitting Calf Stretch with Strap  - 2 x daily - 7 x weekly - 5 reps - 30 hold  - Supine Quad Set  - 2 x daily - 7 x weekly - 3 sets - 10 reps - 5 hold  - Supine Hip Adduction Isometric with Ball  - 2 x daily - 7 x weekly - 3 sets - 10 reps - 5 hold  - Hooklying Isometric Clamshell  - 2 x daily - 7 x weekly - 3 sets - 10 reps

## 2025-03-06 ENCOUNTER — TREATMENT (OUTPATIENT)
Dept: PHYSICAL THERAPY | Facility: CLINIC | Age: 75
End: 2025-03-06
Payer: MEDICARE

## 2025-03-06 DIAGNOSIS — M17.11 UNILATERAL PRIMARY OSTEOARTHRITIS, RIGHT KNEE: ICD-10-CM

## 2025-03-06 PROCEDURE — 97110 THERAPEUTIC EXERCISES: CPT | Mod: GP,CQ

## 2025-03-06 NOTE — PROGRESS NOTES
"Physical Therapy Treatment    Patient Name: Tru Manzo  MRN: [id number]  Today's Date: 3/6/2025  Time Calculation  Start Time: 1315  Stop Time: 1354  Time Calculation (min): 39 min  PT Therapeutic Procedures Time Entry  Therapeutic Exercise Time Entry: 38       Insurance  COPAY 0 (0)   Visit number: 3 of 8  Authorization info: No Auth Req  Insurance Type: MEDICARE/ AARP 2410($0 USED)     Current Problem  1. Unilateral primary osteoarthritis, right knee  Follow Up In Physical Therapy          Subjective   General   Pt. Reports he has a 3/10 pain coming in which is the highest it has been in a few weeks.   Precautions     Pain       Objective   117* AAROM  -7* AAROM    Treatments:   Heel Slide: 5\" 2x10  QS: 5\" x20  SLR: 2x10  LAQ 3\" 2x10  Bridges 2x10  HR/TR x20  STS x20  Stand hip 3-way x20  Step ups F/L 4\" x20    Assessment:   Pt. Progressing w/ all exercises well. Pt. Stated he was able to perform SLRs w/ less difficulty this visit. Pt. AAROM improved to -7* to 117* w/ pain and tightness limiting full ROM. Added several new standing exercises w/ most difficulty during lateral step ups d/t weakness. Pt. Required minimal verbal cueing for not circumducting while performing forward step ups. Pt. Will continue w/ current HEP.     Plan:   Continue to increase strength/ROM.     OP EDUCATION:       Goals:     "

## 2025-03-10 ENCOUNTER — HOSPITAL ENCOUNTER (OUTPATIENT)
Dept: CARDIOLOGY | Age: 75
Discharge: HOME | End: 2025-03-10
Payer: MEDICARE

## 2025-03-10 DIAGNOSIS — Z95.0 PACEMAKER: Primary | ICD-10-CM

## 2025-03-10 PROCEDURE — 93296 REM INTERROG EVL PM/IDS: CPT

## 2025-03-11 ENCOUNTER — TREATMENT (OUTPATIENT)
Dept: PHYSICAL THERAPY | Facility: CLINIC | Age: 75
End: 2025-03-11
Payer: MEDICARE

## 2025-03-11 DIAGNOSIS — M17.11 UNILATERAL PRIMARY OSTEOARTHRITIS, RIGHT KNEE: ICD-10-CM

## 2025-03-11 PROCEDURE — 97110 THERAPEUTIC EXERCISES: CPT | Mod: GP,CQ

## 2025-03-11 NOTE — PROGRESS NOTES
"Physical Therapy Treatment    Patient Name: Tru Manzo  MRN: [id number]  Today's Date: 3/11/2025  Time Calculation  Start Time: 0910  Stop Time: 0955  Time Calculation (min): 45 min  PT Therapeutic Procedures Time Entry  Therapeutic Exercise Time Entry: 40       Insurance  COPAY 0 (0)   Visit number: 4 of 8  Authorization info: No Auth Req  Insurance Type: MEDICARE/ AARP 2410($0 USED)     Current Problem  1. Unilateral primary osteoarthritis, right knee  Follow Up In Physical Therapy          Subjective   General   Pt. Reports he has a 2/10 pain coming in.   Precautions     Pain       Objective   115* AAROM  -10* AAROM    Treatments:  Renan 6'  Heel Slide: 5\" 2x10  QS: 5\" x20  SLR Flex/Abd: 2x10  Bridges 2x10  LAQ 3\" 2x10  STS x20  HR/TR 1/2 roll x20  Stand hip 3-way RTB x20  Step ups F/L 6\" x20  Step overs 4\" x20  Tapdowns 4\" x20    Assessment:   Pt. Progressing w/ all exercises well. Added step overs and tapdowns for increasing eccentric strength w/ good pt tolerance. Able to progress HR/TR, Abd SLR, Stand hip, and step ups for increasing LE strength for returning to PLOF. Pt. Stated he felt good after treatment today. Pt. Will continue w/ current HEP.     Plan:   Continue to increase strength/ROM.     OP EDUCATION:       Goals:     "

## 2025-03-13 ENCOUNTER — TREATMENT (OUTPATIENT)
Dept: PHYSICAL THERAPY | Facility: CLINIC | Age: 75
End: 2025-03-13
Payer: MEDICARE

## 2025-03-13 DIAGNOSIS — M17.11 UNILATERAL PRIMARY OSTEOARTHRITIS, RIGHT KNEE: ICD-10-CM

## 2025-03-13 PROCEDURE — 97110 THERAPEUTIC EXERCISES: CPT | Mod: GP,CQ

## 2025-03-13 ASSESSMENT — PAIN - FUNCTIONAL ASSESSMENT: PAIN_FUNCTIONAL_ASSESSMENT: 0-10

## 2025-03-13 ASSESSMENT — PAIN SCALES - GENERAL: PAINLEVEL_OUTOF10: 3

## 2025-03-13 NOTE — PROGRESS NOTES
"Physical Therapy Treatment    Patient Name: Tru Manzo  MRN: [id number]  Today's Date: 3/13/2025  Time in 0915  Time [id number]  Treatment Time 45 min  Therapeutic Exercise    Insurance  COPAY 0 (0)   Visit number: 5 of 8  Authorization info: No Auth Req  Insurance Type: MEDICARE/ [employer] 2410($0 USED)     Current Problem  1. Unilateral primary osteoarthritis, right knee  Follow Up In Physical Therapy          Subjective   General  Reports his knee is sore from playing with his grandkids.   Precautions     Pain  Pain Assessment: 0-10  0-10 (Numeric) Pain Score: 3  Pain Location: Knee  Pain Orientation: Right    Objective   115* AAROM  -8* AAROM    Treatments:  Renan 6'  Heel Slide: 5\" 2x10  QS: 5\" x20  SLR Flex/Abd: 2x10  Bridges 2x10  LAQ 2# 2x10  STS x20  HR/TR 1/2 roll x20  Stand hip 3-way RTB x20  Step ups F/L 6\" x20  Step overs 6\" x20  Tapdowns 4\" x20    Assessment:  Patient tolerated treatment well with no increase in pain. Able to increase height of step with no significant deviations. Improving knee extension ROM following treatment. Will continue to benefit from PT to improve knee strength and ROM.     Plan:   Continue to focus on knee strength and ROM.    OP EDUCATION:       Goals:     "

## 2025-03-18 ENCOUNTER — TREATMENT (OUTPATIENT)
Dept: PHYSICAL THERAPY | Facility: CLINIC | Age: 75
End: 2025-03-18
Payer: MEDICARE

## 2025-03-18 DIAGNOSIS — M17.11 UNILATERAL PRIMARY OSTEOARTHRITIS, RIGHT KNEE: ICD-10-CM

## 2025-03-18 PROCEDURE — 97110 THERAPEUTIC EXERCISES: CPT | Mod: GP,CQ

## 2025-03-18 ASSESSMENT — PAIN - FUNCTIONAL ASSESSMENT: PAIN_FUNCTIONAL_ASSESSMENT: 0-10

## 2025-03-18 ASSESSMENT — PAIN SCALES - GENERAL: PAINLEVEL_OUTOF10: 2

## 2025-03-18 NOTE — PROGRESS NOTES
"Physical Therapy Treatment    Patient Name: Tru Manzo  MRN: [id number]  Today's Date: 3/18/2025  Time in 0910  Time out 1000  Treatment Time 50 min  Therapeutic Exercise 50 min    Insurance  COPAY 0 (0)   Visit number: 6 of 8  Authorization info: No Auth Req  Insurance Type: MEDICARE/ AARP 2410($0 USED)     Current Problem  1. Unilateral primary osteoarthritis, right knee  Follow Up In Physical Therapy          Subjective   General  Reports he returned to the gym to start walking and riding the stationary bike.   Precautions     Pain  Pain Assessment: 0-10  0-10 (Numeric) Pain Score: 2  Pain Location: Knee  Pain Orientation: Right    Objective   116* AAROM  -7* AAROM    Treatments:  Renan 6'  Heel Slide: 5\" 2x10  QS: 5\" x20  SLR Flex/Abd: 2x10  Bridges 2x10  LAQ 2# 2x10  STS x20 5#  HR/TR 1/2 roll x20  Stand hip 3-way RTB x20  Step ups F/L 6\" x20  Step overs 6\" x20  Tapdowns 6\" x20  Side stepping RTB x 2 laps  TKE green ball x 20    Assessment:  Patient tolerated treatment well with no increase in pain. Added TKE  to improve quad strength. Continues to demo improving knee ROM following treatment. Will continue to benefit from PT to improve knee strength and ROM.     Plan:   Continue to focus on knee strength and ROM.    OP EDUCATION:       Goals:     "

## 2025-03-20 ENCOUNTER — TREATMENT (OUTPATIENT)
Dept: PHYSICAL THERAPY | Facility: CLINIC | Age: 75
End: 2025-03-20
Payer: MEDICARE

## 2025-03-20 DIAGNOSIS — M17.11 UNILATERAL PRIMARY OSTEOARTHRITIS, RIGHT KNEE: ICD-10-CM

## 2025-03-20 PROCEDURE — 97110 THERAPEUTIC EXERCISES: CPT | Mod: GP,CQ

## 2025-03-20 NOTE — PROGRESS NOTES
"Physical Therapy Treatment    Patient Name: Tru Manzo  MRN: [id number]  Today's Date: 3/20/2025  Time Calculation  Start Time: 0910  Stop Time: 0955  Time Calculation (min): 45 min  PT Therapeutic Procedures Time Entry  Therapeutic Exercise Time Entry: 40       Insurance  COPAY 0 (0)   Visit number: 7 of 8  Authorization info: No Auth Req  Insurance Type: MEDICARE/ [employer] [id number]($0 USED)     Current Problem  1. Unilateral primary osteoarthritis, right knee  Follow Up In Physical Therapy          Subjective   General     Precautions     Pain       Objective   122* AAROM  -3* AAROM    Treatments:  Renan 6'  Heel Slide: 5\" 2x10  QS: 5\" x10  SLR Flex/Abd: 2x10  Bridges 2x10  LAQ 3# 3\" 2x10  STS x20 5#  HR/TR 1/2 roll x20  Stand hip 3-way GTB x20  Step ups F/L 6\" x20  Step overs 6\" x20  Tapdowns 6\" x20  Side stepping RTB --  TKE green ball x 20    Assessment:   Pt. Progressing w/ all exercises well. Pt. AAROM improved to -3* to 122* w/ some tightness at B end ranges. Pt. Stated his knee has been feeling more mobile as of late. Added GTB to stand hip 3-way for increasing LE strength. Pt. Will continue to benefit from skilled PT for increasing LE eccentric strength. Pt. Will continue w/ current HEP.     Plan:   Continue to increase strength/endurance for performing ADLs.     OP EDUCATION:       Goals:     "

## 2025-03-21 NOTE — PROGRESS NOTES
"Physical Therapy    Discharge Summary  Discharge Summary: PT    Discharge Information: Date of discharge 3/25/25    Therapy Summary: Progressed        Discharge Status: Stable     Rehab Discharge Reason: Achieved all and/or the most significant goals(s)      Patient Name: Tru Manzo  MRN: [id number]  Today's Date: 3/25/2025  Time Calculation  Start Time: 0954  Stop Time: 1026  Time Calculation (min): 32 min  PT Therapeutic Procedures Time Entry  Therapeutic Exercise Time Entry: 32       Insurance  COPAY 0 (0)   Visit number: 7 of 8  Authorization info: No Auth Req  Insurance Type: MEDICARE/ [employer]($0 USED)     Current Problem  1. Unilateral primary osteoarthritis, right knee              Subjective   General   Pt states he is doing well and is ready for DC.  Precautions     Pain  Pain Assessment: 0-10  0-10 (Numeric) Pain Score: 0 - No pain  Pain Location: Knee  Pain Orientation: Right  Clinical Progression: Resolved    Objective   Other Measures  Lower Extremity Funtional Score (LEFS): 58  Instructed pt to perform more regular R Quad stretching at home.    Knee AROM (degrees) -  R knee Extension: -6  R knee Flexion:  108        Knee MMT (/5) -   R knee Extension: 5  R knee Flexion: 5    Treatments:  Renan 6'  Heel Slide: 5\" 2x10---  QS: 5\" x10---  SLR Flex/Abd: 2x10---  Bridges 2x10---  LAQ 3# 3\" 2x10---  STS x20 5#---  HR/TR 1/2 roll x20---  Stand hip 3-way GTB x20---  Step ups F/L 6\" x20---  Step overs 6\" x20---  Tapdowns 6\" x20---  Side stepping RTB ---  TKE green ball x 20---  Leg Over Table Side Quad Strap Stretch 5x30\"  Hip Scoots 5x30\"     Assessment:  Has tightness of R Quad, but able to gain more range w/ stretching.Pt achieved all goals and is ready for DC from PT at this time.  Overall demonstrated good gains w/ therapy intervention.     Plan:  Pt to be Dc'd from PT this date and follow up w/ MD PRN or as scheduled.      OP EDUCATION:       Goals:   Increase ROM to WFL of R " knee-MET  Increase Strength where deficits noted by 1/3 to 1 mm grade of R knee-MET  Ind w/ HEP to expedite progress and promote goal achievement of R knee-MET.    Improve Outcome score  for evidence of improved function of R knee-MET.  Return to PLOF of R knee-MET  Decrease pain to 2/10 or less of R knee-MET

## 2025-03-25 ENCOUNTER — TREATMENT (OUTPATIENT)
Dept: PHYSICAL THERAPY | Facility: CLINIC | Age: 75
End: 2025-03-25
Payer: MEDICARE

## 2025-03-25 DIAGNOSIS — M17.11 UNILATERAL PRIMARY OSTEOARTHRITIS, RIGHT KNEE: Primary | ICD-10-CM

## 2025-03-25 PROCEDURE — 97110 THERAPEUTIC EXERCISES: CPT | Mod: GP | Performed by: PHYSICAL THERAPIST

## 2025-03-25 ASSESSMENT — PAIN SCALES - GENERAL: PAINLEVEL_OUTOF10: 0 - NO PAIN

## 2025-03-25 ASSESSMENT — PAIN - FUNCTIONAL ASSESSMENT: PAIN_FUNCTIONAL_ASSESSMENT: 0-10

## 2025-03-27 NOTE — PROGRESS NOTES
History: The patient is here for recheck of the right knee.  They are 7 weeks out from a total knee arthroplasty.  Therapy is going well and they are making progress.  Overall they are doing well.    Physical Exam: The wound is healing nicely.  Swelling and warmth are normal for this stage of healing.  There is a stable posterior drawer.  Mild laxity with valgus stress.  No denies any numbness or tingling in the leg.  Range of motion is from 3-110 degrees.      Radiographs: AP and lateral views of the right knee show excellent alignment of the total knee arthroplasty with no evidence of loosening or acute bony abnormality.    Assessment: Stable right total knee arthroplasty, 7 weeks out    Plan: Overall he is doing very well.  His swelling and pain are improving.  He can continue with an icing regimen.  He is going to continue to finish out his formal therapy and maximize his range of motion.  He can continue with his water exercises as well.  We will see him back in 6 weeks for final recheck.  If doing well we can hold off on x-rays.  All questions were answered the patient.

## 2025-03-31 ENCOUNTER — OFFICE VISIT (OUTPATIENT)
Dept: ORTHOPEDIC SURGERY | Facility: CLINIC | Age: 75
End: 2025-03-31
Payer: MEDICARE

## 2025-03-31 ENCOUNTER — HOSPITAL ENCOUNTER (OUTPATIENT)
Dept: RADIOLOGY | Facility: CLINIC | Age: 75
Discharge: HOME | End: 2025-03-31
Payer: MEDICARE

## 2025-03-31 DIAGNOSIS — M17.11 PRIMARY OSTEOARTHRITIS OF RIGHT KNEE: ICD-10-CM

## 2025-03-31 PROCEDURE — 99211 OFF/OP EST MAY X REQ PHY/QHP: CPT | Performed by: ORTHOPAEDIC SURGERY

## 2025-03-31 PROCEDURE — 73560 X-RAY EXAM OF KNEE 1 OR 2: CPT | Mod: RT

## 2025-03-31 RX ORDER — OMEPRAZOLE 40 MG/1
40 CAPSULE, DELAYED RELEASE ORAL DAILY
Qty: 90 CAPSULE | Refills: 0 | Status: SHIPPED | OUTPATIENT
Start: 2025-03-31

## 2025-03-31 RX ORDER — RIVAROXABAN 20 MG/1
20 TABLET, FILM COATED ORAL
Qty: 90 TABLET | Refills: 3 | Status: SHIPPED | OUTPATIENT
Start: 2025-03-31 | End: 2025-04-01 | Stop reason: SDUPTHER

## 2025-03-31 NOTE — TELEPHONE ENCOUNTER
Requesting medication refill. Please approve or deny this request.    Rx requested:  Requested Prescriptions     Pending Prescriptions Disp Refills    XARELTO 20 MG TABS tablet [Pharmacy Med Name: XARELTO TABS 20MG] 90 tablet 3     Sig: TAKE 1 TABLET DAILY WITH BREAKFAST         Last Office Visit:   12/12/2024      Next Visit Date:  Future Appointments   Date Time Provider Department Center   4/14/2025 11:30 AM Marlen Garrett APRN - CNP [facility]   6/20/2025 10:00 AM [facility]   12/11/2025 12:15 PM Polo Ruiz DO [facility]

## 2025-04-01 NOTE — TELEPHONE ENCOUNTER
Requesting medication refill. Please approve or deny this request.    Rx requested:  Requested Prescriptions     Pending Prescriptions Disp Refills    rivaroxaban (XARELTO) 20 MG TABS tablet 90 tablet 3     Sig: Take 1 tablet by mouth daily (with breakfast)         Last Office Visit:   12/12/2024      Next Visit Date:  Future Appointments   Date Time Provider Department Center   4/14/2025 11:30 AM Marlen Garrett, KRISTEN - CNP [facility]   6/20/2025 10:00 AM [facility]   12/11/2025 12:15 PM Holiday, DO [facility]

## 2025-04-14 ENCOUNTER — OFFICE VISIT (OUTPATIENT)
Age: 75
End: 2025-04-14
Payer: MEDICARE

## 2025-04-14 VITALS
WEIGHT: 217 LBS | RESPIRATION RATE: 18 BRPM | OXYGEN SATURATION: 97 % | HEART RATE: 70 BPM | BODY MASS INDEX: 29.39 KG/M2 | HEIGHT: 72 IN | SYSTOLIC BLOOD PRESSURE: 132 MMHG | DIASTOLIC BLOOD PRESSURE: 86 MMHG | TEMPERATURE: 97.7 F

## 2025-04-14 DIAGNOSIS — I10 PRIMARY HYPERTENSION: ICD-10-CM

## 2025-04-14 DIAGNOSIS — E78.2 MIXED HYPERLIPIDEMIA: ICD-10-CM

## 2025-04-14 DIAGNOSIS — Z12.5 SCREENING FOR PROSTATE CANCER: ICD-10-CM

## 2025-04-14 DIAGNOSIS — Z00.00 MEDICARE ANNUAL WELLNESS VISIT, SUBSEQUENT: Primary | ICD-10-CM

## 2025-04-14 LAB
ALBUMIN SERPL-MCNC: 4.3 G/DL (ref 3.5–4.6)
ALP SERPL-CCNC: 41 U/L (ref 35–104)
ALT SERPL-CCNC: 17 U/L (ref 0–41)
ANION GAP SERPL CALCULATED.3IONS-SCNC: 10 MEQ/L (ref 9–15)
AST SERPL-CCNC: 22 U/L (ref 0–40)
BASOPHILS # BLD: 0 K/UL (ref 0–0.2)
BASOPHILS NFR BLD: 0.3 %
BILIRUB SERPL-MCNC: 0.4 MG/DL (ref 0.2–0.7)
BUN SERPL-MCNC: 29 MG/DL (ref 8–23)
CALCIUM SERPL-MCNC: 9.7 MG/DL (ref 8.5–9.9)
CHLORIDE SERPL-SCNC: 103 MEQ/L (ref 95–107)
CHOLEST SERPL-MCNC: 130 MG/DL (ref 0–199)
CO2 SERPL-SCNC: 28 MEQ/L (ref 20–31)
CREAT SERPL-MCNC: 1.43 MG/DL (ref 0.7–1.2)
EOSINOPHIL # BLD: 0.2 K/UL (ref 0–0.7)
EOSINOPHIL NFR BLD: 2.2 %
ERYTHROCYTE [DISTWIDTH] IN BLOOD BY AUTOMATED COUNT: 13.7 % (ref 11.5–14.5)
GLOBULIN SER CALC-MCNC: 2.5 G/DL (ref 2.3–3.5)
GLUCOSE SERPL-MCNC: 80 MG/DL (ref 70–99)
HCT VFR BLD AUTO: 36.7 % (ref 42–52)
HDLC SERPL-MCNC: 30 MG/DL (ref 40–59)
HGB BLD-MCNC: 11.8 G/DL (ref 14–18)
LDLC SERPL CALC-MCNC: 81 MG/DL (ref 0–129)
LYMPHOCYTES # BLD: 2 K/UL (ref 1–4.8)
LYMPHOCYTES NFR BLD: 28 %
MCH RBC QN AUTO: 31.5 PG (ref 27–31.3)
MCHC RBC AUTO-ENTMCNC: 32.2 % (ref 33–37)
MCV RBC AUTO: 97.9 FL (ref 79–92.2)
MONOCYTES # BLD: 0.6 K/UL (ref 0.2–0.8)
MONOCYTES NFR BLD: 8.2 %
NEUTROPHILS # BLD: 4.4 K/UL (ref 1.4–6.5)
NEUTS SEG NFR BLD: 60.6 %
PLATELET # BLD AUTO: 236 K/UL (ref 130–400)
POTASSIUM SERPL-SCNC: 4.9 MEQ/L (ref 3.4–4.9)
PROT SERPL-MCNC: 6.8 G/DL (ref 6.3–8)
PSA SERPL-MCNC: 3.47 NG/ML (ref 0–4)
RBC # BLD AUTO: 3.75 M/UL (ref 4.7–6.1)
SODIUM SERPL-SCNC: 141 MEQ/L (ref 135–144)
TRIGL SERPL-MCNC: 97 MG/DL (ref 0–150)
WBC # BLD AUTO: 7.2 K/UL (ref 4.8–10.8)

## 2025-04-14 PROCEDURE — 1159F MED LIST DOCD IN RCRD: CPT | Performed by: NURSE PRACTITIONER

## 2025-04-14 PROCEDURE — G0439 PPPS, SUBSEQ VISIT: HCPCS | Performed by: NURSE PRACTITIONER

## 2025-04-14 PROCEDURE — 3075F SYST BP GE 130 - 139MM HG: CPT | Performed by: NURSE PRACTITIONER

## 2025-04-14 PROCEDURE — 3079F DIAST BP 80-89 MM HG: CPT | Performed by: NURSE PRACTITIONER

## 2025-04-14 PROCEDURE — 1123F ACP DISCUSS/DSCN MKR DOCD: CPT | Performed by: NURSE PRACTITIONER

## 2025-04-14 ASSESSMENT — PATIENT HEALTH QUESTIONNAIRE - PHQ9
SUM OF ALL RESPONSES TO PHQ QUESTIONS 1-9: 0
1. LITTLE INTEREST OR PLEASURE IN DOING THINGS: NOT AT ALL
SUM OF ALL RESPONSES TO PHQ QUESTIONS 1-9: 0
SUM OF ALL RESPONSES TO PHQ QUESTIONS 1-9: 0
2. FEELING DOWN, DEPRESSED OR HOPELESS: NOT AT ALL
SUM OF ALL RESPONSES TO PHQ QUESTIONS 1-9: 0

## 2025-04-14 ASSESSMENT — LIFESTYLE VARIABLES
HOW MANY STANDARD DRINKS CONTAINING ALCOHOL DO YOU HAVE ON A TYPICAL DAY: 1 OR 2
HOW OFTEN DO YOU HAVE A DRINK CONTAINING ALCOHOL: 2-3 TIMES A WEEK

## 2025-04-14 NOTE — PROGRESS NOTES
Medicare Annual Wellness Visit    Denzel Bateman is here for Medicare AWV    Assessment & Plan   Medicare annual wellness visit, subsequent  Primary hypertension  -     CBC with Auto Differential; Future  -     Comprehensive Metabolic Panel; Future  -     Lipid Panel; Future  Mixed hyperlipidemia  -     CBC with Auto Differential; Future  -     Comprehensive Metabolic Panel; Future  -     Lipid Panel; Future  Screening for prostate cancer  -     PSA Screening; Future      Assessment & Plan  1. Medicare wellness visit.  - Blood pressure readings are within the normal range.  - No swelling in legs, feet, or ankles; pulse is strong and regular.  - Due for annual laboratory tests; orders for a comprehensive panel will be placed today.  - No concerns or need for medication refills reported; knee pain post-surgery is decreasing.    2. Post-operative knee pain.  - Knee pain is reported to be decreasing since surgery in early February.  - Physical examination shows no signs of complications.  - Discussed the progression of pain and improvement.  - No new medications or therapies required at this time.    3. Cardiac monitoring.  - Continues routine follow-ups with Dr. Ruiz for heart condition.  - No swelling in legs, feet, or ankles; pulse is strong and regular.  - Discussed upcoming battery change for heart monitor, potentially around 06/20/2025.  - No new referrals or changes in management required.    4. Vision care.  - Last eye examination was in 2024; no current vision concerns.  - History of cataract surgery with no complications reported.  - Recommended scheduling an eye appointment within the year.  - No new treatments or referrals required.         Return in 1 year (on 4/14/2026) for Medicare AWV.     Subjective     History of Present Illness  The patient presents for a Medicare wellness visit.    No current health concerns are reported, and no medication refills are needed at this time. He underwent knee

## 2025-04-25 ENCOUNTER — RESULTS FOLLOW-UP (OUTPATIENT)
Age: 75
End: 2025-04-25

## 2025-04-30 ENCOUNTER — TELEPHONE (OUTPATIENT)
Age: 75
End: 2025-04-30

## 2025-04-30 ENCOUNTER — OFFICE VISIT (OUTPATIENT)
Age: 75
End: 2025-04-30
Payer: MEDICARE

## 2025-04-30 VITALS
HEIGHT: 72 IN | HEART RATE: 80 BPM | WEIGHT: 220 LBS | SYSTOLIC BLOOD PRESSURE: 126 MMHG | TEMPERATURE: 97.5 F | DIASTOLIC BLOOD PRESSURE: 76 MMHG | BODY MASS INDEX: 29.8 KG/M2 | OXYGEN SATURATION: 96 %

## 2025-04-30 DIAGNOSIS — R09.81 NASAL CONGESTION: ICD-10-CM

## 2025-04-30 DIAGNOSIS — U07.1 COVID-19: Primary | ICD-10-CM

## 2025-04-30 DIAGNOSIS — R30.0 DYSURIA: ICD-10-CM

## 2025-04-30 LAB
BILIRUBIN, POC: ABNORMAL
BLOOD URINE, POC: ABNORMAL
CLARITY, POC: CLEAR
COLOR, POC: YELLOW
GLUCOSE URINE, POC: ABNORMAL MG/DL
INFLUENZA A ANTIGEN, POC: NEGATIVE
INFLUENZA B ANTIGEN, POC: NEGATIVE
KETONES, POC: ABNORMAL MG/DL
LEUKOCYTE EST, POC: ABNORMAL
Lab: ABNORMAL
NITRITE, POC: ABNORMAL
PH, POC: 6
PROTEIN, POC: >=300 MG/DL
QC PASS/FAIL: ABNORMAL
SARS-COV-2 RDRP RESP QL NAA+PROBE: POSITIVE
SPECIFIC GRAVITY, POC: 1.02
UROBILINOGEN, POC: 0.2 MG/DL
VALID INTERNAL CONTROL, POC: NORMAL

## 2025-04-30 PROCEDURE — 1160F RVW MEDS BY RX/DR IN RCRD: CPT | Performed by: NURSE PRACTITIONER

## 2025-04-30 PROCEDURE — 99212 OFFICE O/P EST SF 10 MIN: CPT | Performed by: NURSE PRACTITIONER

## 2025-04-30 PROCEDURE — G8417 CALC BMI ABV UP PARAM F/U: HCPCS | Performed by: NURSE PRACTITIONER

## 2025-04-30 PROCEDURE — 87635 SARS-COV-2 COVID-19 AMP PRB: CPT | Performed by: NURSE PRACTITIONER

## 2025-04-30 PROCEDURE — 3074F SYST BP LT 130 MM HG: CPT | Performed by: NURSE PRACTITIONER

## 2025-04-30 PROCEDURE — G8427 DOCREV CUR MEDS BY ELIG CLIN: HCPCS | Performed by: NURSE PRACTITIONER

## 2025-04-30 PROCEDURE — PBSHW AMB POC INFLUENZA A  AND B REAL-TIME RT-PCR: Performed by: NURSE PRACTITIONER

## 2025-04-30 PROCEDURE — 1036F TOBACCO NON-USER: CPT | Performed by: NURSE PRACTITIONER

## 2025-04-30 PROCEDURE — 1123F ACP DISCUSS/DSCN MKR DOCD: CPT | Performed by: NURSE PRACTITIONER

## 2025-04-30 PROCEDURE — 87502 INFLUENZA DNA AMP PROBE: CPT | Performed by: NURSE PRACTITIONER

## 2025-04-30 PROCEDURE — PBSHW POCT URINALYSIS DIPSTICK W/O MICROSCOPE (AUTO): Performed by: NURSE PRACTITIONER

## 2025-04-30 PROCEDURE — PBSHW POCT COVID-19 RAPID, NAAT: Performed by: NURSE PRACTITIONER

## 2025-04-30 PROCEDURE — 99213 OFFICE O/P EST LOW 20 MIN: CPT | Performed by: NURSE PRACTITIONER

## 2025-04-30 PROCEDURE — 3078F DIAST BP <80 MM HG: CPT | Performed by: NURSE PRACTITIONER

## 2025-04-30 PROCEDURE — 81003 URINALYSIS AUTO W/O SCOPE: CPT | Performed by: NURSE PRACTITIONER

## 2025-04-30 PROCEDURE — 1159F MED LIST DOCD IN RCRD: CPT | Performed by: NURSE PRACTITIONER

## 2025-04-30 ASSESSMENT — ENCOUNTER SYMPTOMS
SINUS PRESSURE: 0
EYE REDNESS: 0
TROUBLE SWALLOWING: 0
CHEST TIGHTNESS: 0
VOICE CHANGE: 0
ABDOMINAL PAIN: 0
COUGH: 0
SORE THROAT: 0
RHINORRHEA: 1
DIARRHEA: 0
NAUSEA: 0
WHEEZING: 0
EYE PAIN: 0
SINUS PAIN: 0
VOMITING: 0
EYE DISCHARGE: 0
SHORTNESS OF BREATH: 0
PHOTOPHOBIA: 0
STRIDOR: 0
EYE ITCHING: 0

## 2025-04-30 NOTE — TELEPHONE ENCOUNTER
Patient was in walk in today with Liyah Levin and prescribed molnupiravir  Pharmacy is asking for $1500 for this script. The patient can not afford this.  Substitution available ? Any other options?      Patient phone - [phone]

## 2025-04-30 NOTE — PROGRESS NOTES
Subjective:      Patient ID: Denzel Bateman is a 75 y.o. male who presents today for:  Chief Complaint   Patient presents with    Cold Symptoms     Pt states runny nose, feels warm for about 1 week.   Has taken coricidin for sx's.          HPI    Past Medical History:   Diagnosis Date    BBB (bundle branch block)     RIGHT    CAD (coronary artery disease) 2015    Cardiomyopathy, nonischemic (HCC) 2018    Chronic kidney disease     GERD (gastroesophageal reflux disease)     Hyperlipidemia     Hypertension     Paroxysmal atrial fibrillation (HCC) 2018    Pelvic fracture (HCC)     REMOTE    Polycystic kidney disease 2023    Retinal artery occlusion 2015    Seizures (HCC)     History of years ago     Past Surgical History:   Procedure Laterality Date    ANGIOPLASTY      ANKLE FRACTURE SURGERY Right     [facility]ACT [facility]OVAL Left 2016    [facility]    CATARACT REMOVAL Right 2016    [facility]    COLONOSCOPY N/A 10/5/2020    COLONOSCOPY WITH POLYPECTOMY performed by Carlos Neumann MD at [facility]    FRACTURE SURGERY      PELVIC FRACTURE SURGERY      ROTATOR CUFF REPAIR       Social History     Socioeconomic History    Marital status:      Spouse name: Not on file    Number of children: Not on file    Years of education: Not on file    Highest education level: Not on file   Occupational History    Not on file   Tobacco Use    Smoking status: Former     Current packs/day: 0.00     Average packs/day: 1 pack/day for 40.7 years (40.7 ttl pk-yrs)     Types: Cigarettes     Start date:      Quit date: 2007     Years since quittin.6    Smokeless tobacco: Never   Vaping Use    Vaping status: Never Used   Substance and Sexual Activity    Alcohol use: Yes    Drug use: No    Sexual activity: Not Currently   Other Topics Concern    Not on file   Social History Narrative    Not on file     Social Drivers of Health     Financial Resource Strain: Low Risk  (2025)    Received 
Conjunctiva/sclera: Conjunctivae normal.   Neck:      Trachea: Trachea normal.   Cardiovascular:      Rate and Rhythm: Normal rate and regular rhythm.      Pulses: Normal pulses.      Heart sounds: Normal heart sounds, S1 normal and S2 normal.   Pulmonary:      Effort: Pulmonary effort is normal.      Breath sounds: Normal breath sounds and air entry.   Abdominal:      General: Abdomen is flat. Bowel sounds are normal. There is no distension.      Palpations: Abdomen is soft.      Tenderness: There is no abdominal tenderness. There is no right CVA tenderness, left CVA tenderness or guarding.   Musculoskeletal:         General: Normal range of motion.      Cervical back: Normal range of motion and neck supple. No tenderness.   Lymphadenopathy:      Cervical: No cervical adenopathy.   Skin:     General: Skin is warm and dry.      Capillary Refill: Capillary refill takes less than 2 seconds.   Neurological:      General: No focal deficit present.      Mental Status: He is alert and oriented to person, place, and time. Mental status is at baseline.   Psychiatric:         Attention and Perception: Attention and perception normal.         Mood and Affect: Mood and affect normal.         Speech: Speech normal.         Behavior: Behavior normal. Behavior is cooperative.         Thought Content: Thought content normal.         Cognition and Memory: Cognition and memory normal.         Judgment: Judgment normal.     Physical Exam  Ears: External ear canals and tympanic membranes intact  Nose: Septum midline, nares patent, mucosa normal  Respiratory: Clear to auscultation, no wheezing, rales or rhonchi      Assessment:      Assessment & Plan  1. Rhinorrhea.  - Symptoms suggest a potential viral infection, possibly a common cold or allergies.  - Congestion has been present for about 3 days.  - A nasal swab will be conducted to rule out influenza or COVID-19.  - If the nasal swab confirms influenza or COVID-19, appropriate

## 2025-05-12 ENCOUNTER — OFFICE VISIT (OUTPATIENT)
Dept: ORTHOPEDIC SURGERY | Facility: CLINIC | Age: 75
End: 2025-05-12
Payer: MEDICARE

## 2025-05-12 DIAGNOSIS — Z96.651 STATUS POST TOTAL RIGHT KNEE REPLACEMENT: Primary | ICD-10-CM

## 2025-05-12 PROCEDURE — 1159F MED LIST DOCD IN RCRD: CPT | Performed by: ORTHOPAEDIC SURGERY

## 2025-05-12 PROCEDURE — 1036F TOBACCO NON-USER: CPT | Performed by: ORTHOPAEDIC SURGERY

## 2025-05-12 PROCEDURE — 99024 POSTOP FOLLOW-UP VISIT: CPT | Performed by: ORTHOPAEDIC SURGERY

## 2025-05-12 PROCEDURE — 99212 OFFICE O/P EST SF 10 MIN: CPT | Performed by: ORTHOPAEDIC SURGERY

## 2025-05-12 NOTE — PROGRESS NOTES
History: Tru is here for a recheck of the right knee. They are 3 months out from a total knee arthroplasty. His knee is doing well. He continues to have some numbness but the swelling has decreased. He has completed therapy and is doing some home exercises daily.     Past medical history: Multiple  Medications: Multiple  Allergies: No known drug allergies    Please refer to the intake H&P regarding the patient's review of systems, family history and social history as was done today    HEENT: Normal  Lungs: Clear to auscultation  Heart: RRR  Abdomen: Soft, nontender  Skin: clear  Extremity: Incisions healing nicely.  Fairly mild swelling and warmth.  He has full extension with some tightness during flexion past to 112 degrees.  No pain with gentle palpation.  Mild numbness laterally.  Contralateral exam is normal for strength, motion, stability and neurovascular assessment.    Radiographs: Previous x-rays of the right knee show excellent alignment of the total knee arthroplasty with no evidence of loosening or acute bony abnormality.     Assessment: Stable right total knee arthroplasty, 3 months out     Plan: Overall his knee is doing quite well. His swelling as pretty much gone away and he does continue to have some numbness. He does understand that this will possibly continue for the next several months. He will continue his icing regime. He is also going to work on his flexion and therapy exercises on his own.  Otherwise he is happy with his progress and we will see him back as needed.    All questions were answered today with the patient.    Scribe Attestation  By signing my name below, Aretha LANTIGUA Scribe   attest that this documentation has been prepared under the direction and in the presence of Cristóbal Nevarez MD.

## 2025-05-20 DIAGNOSIS — G40.909 SEIZURE DISORDER (MULTI): ICD-10-CM

## 2025-05-21 RX ORDER — LEVETIRACETAM 250 MG/1
250 TABLET ORAL 2 TIMES DAILY
Qty: 90 TABLET | Refills: 7 | Status: SHIPPED | OUTPATIENT
Start: 2025-05-21

## 2025-06-20 ENCOUNTER — HOSPITAL ENCOUNTER (OUTPATIENT)
Dept: CARDIOLOGY | Age: 75
Discharge: HOME | End: 2025-06-20
Payer: MEDICARE

## 2025-06-20 PROCEDURE — 93280 PM DEVICE PROGR EVAL DUAL: CPT

## 2025-06-27 RX ORDER — OMEPRAZOLE 40 MG/1
40 CAPSULE, DELAYED RELEASE ORAL DAILY
Qty: 90 CAPSULE | Refills: 3 | Status: SHIPPED | OUTPATIENT
Start: 2025-06-27

## 2025-09-23 ENCOUNTER — APPOINTMENT (OUTPATIENT)
Dept: NEUROLOGY | Facility: CLINIC | Age: 75
End: 2025-09-23
Payer: MEDICARE

## (undated) DEVICE — HOOD, SURGICAL, FLYTE, T7

## (undated) DEVICE — WOUND SYSTEM, DEBRIDEMENT & CLEANING, O.R DUOPAK

## (undated) DEVICE — SUTURE, ETHIBOND, P2, V-37, 30 IN, GREEN

## (undated) DEVICE — PADDING, CAST, SPECIALIST, 6 IN X 4 YD, STERILE

## (undated) DEVICE — GLOVE, SURGICAL, PROTEXIS PI , 7.5, PF, LF

## (undated) DEVICE — CAUTERY, PENCIL, PUSH BUTTON, SMOKE EVAC, 70MM

## (undated) DEVICE — WRAP, DEMAYO, LEG, STERILE

## (undated) DEVICE — BANDAGE, ELASTIC, 6 X 10YD, BEIGE, LF

## (undated) DEVICE — DRESSING, MEPILEX BORDER, POST-OP AG, 4 X 12 IN

## (undated) DEVICE — SOLUTION, IRRIGATION, USP, SODIUM CHLORIDE 0.9%, 3000 ML

## (undated) DEVICE — BLADE, PATELLA REAMER, W/PILOT HOLE, 32MM

## (undated) DEVICE — BATH BLANKET STERILE

## (undated) DEVICE — GLOVE ORANGE PI 8 1/2   MSG9085

## (undated) DEVICE — PREP, IODOPHOR, W/ALCOHOL, DURAPREP, W/APPLICATOR, 26 CC

## (undated) DEVICE — ADHESIVE, SKIN, DERMABOND ADVANCED, 15CM, PEN-STYLE

## (undated) DEVICE — Device

## (undated) DEVICE — SUTURE, VICRYL 2-0, TAPER POINT, CT-1 UNDYED 27 INCH

## (undated) DEVICE — STRAP, ARM BOARD, 32 X 1.5

## (undated) DEVICE — STRAP, VELCRO, BODY, 4 X 60IN, NS

## (undated) DEVICE — BLADE, STRYKER, 12 X 76 X 1.00L, TOOTH

## (undated) DEVICE — CEMENT, MIXEVAC III, 10S BOWL, KNEES

## (undated) DEVICE — SINGLE PORT MANIFOLD: Brand: NEPTUNE 2

## (undated) DEVICE — CUFF, TOURNIQUET, 30 X 4, DUAL PORT/SNGL BLADDER, DISP, LF

## (undated) DEVICE — FORCEPS BX L240CM JAW DIA2.8MM L CAP W/ NDL MIC MESH TOOTH

## (undated) DEVICE — TOWEL PACK, STERILE, 16X24, XRAY DETECTABLE, BLUE, 4/PK

## (undated) DEVICE — TUBE SET 96 MM 64 MM H2O PERISTALTIC STD AUX CHANNEL

## (undated) DEVICE — TUBING, SUCTION, 1/4" X 10', STRAIGHT: Brand: MEDLINE

## (undated) DEVICE — BRUSH ENDO CLN L90.5IN SHTH DIA1.7MM BRIST DIA5-7MM 2-6MM

## (undated) DEVICE — MANIFOLD, 4 PORT NEPTUNE STANDARD

## (undated) DEVICE — ENDO CARRY-ON PROCEDURE KIT: Brand: ENDO CARRY-ON PROCEDURE KIT

## (undated) DEVICE — IMMOBILIZER, KNEE, 19IN, ADJUSTABLE FOAM, W/ ELASTIC STRAPS

## (undated) DEVICE — GOWN, SURGICAL, ROYAL SILK, XL, STERILE

## (undated) DEVICE — RESERVOIR, DRAINAGE, WOUND, JACKSON-PRATT, 400 CC, SILICONE

## (undated) DEVICE — GLOVE, SURGICAL, PROTEXIS PI BLUE W/NEUTHERA, 7.0, PF, LF

## (undated) DEVICE — GLOVE, SURGICAL, PROTEXIS PI , 7.0, PF, LF

## (undated) DEVICE — SUTURE, VICRYL PLUS, 1, 8X27IN, CT-1, CR, UND, BRAIDED

## (undated) DEVICE — SUTURE, MONOCRYL, 4-0, 27 IN, PS-2, UNDYED

## (undated) DEVICE — Device: Brand: ENDO SMARTCAP

## (undated) DEVICE — ADAPTER FLSH PMP FLD MGMT GI IRRIG OFP 2 DISPOSABLE

## (undated) DEVICE — BLADE, STRYKER, OSC, 19 X 90 X 1.27G